# Patient Record
Sex: FEMALE | Race: WHITE | NOT HISPANIC OR LATINO | URBAN - METROPOLITAN AREA
[De-identification: names, ages, dates, MRNs, and addresses within clinical notes are randomized per-mention and may not be internally consistent; named-entity substitution may affect disease eponyms.]

---

## 2020-10-29 ENCOUNTER — EMERGENCY (EMERGENCY)
Facility: HOSPITAL | Age: 85
LOS: 0 days | Discharge: HOME | End: 2020-10-29
Attending: EMERGENCY MEDICINE | Admitting: EMERGENCY MEDICINE
Payer: MEDICARE

## 2020-10-29 VITALS
SYSTOLIC BLOOD PRESSURE: 141 MMHG | DIASTOLIC BLOOD PRESSURE: 67 MMHG | OXYGEN SATURATION: 95 % | RESPIRATION RATE: 20 BRPM | TEMPERATURE: 98 F | HEART RATE: 104 BPM

## 2020-10-29 VITALS
SYSTOLIC BLOOD PRESSURE: 120 MMHG | HEART RATE: 106 BPM | DIASTOLIC BLOOD PRESSURE: 56 MMHG | OXYGEN SATURATION: 95 % | RESPIRATION RATE: 18 BRPM

## 2020-10-29 DIAGNOSIS — R10.9 UNSPECIFIED ABDOMINAL PAIN: ICD-10-CM

## 2020-10-29 DIAGNOSIS — R41.0 DISORIENTATION, UNSPECIFIED: ICD-10-CM

## 2020-10-29 DIAGNOSIS — R11.10 VOMITING, UNSPECIFIED: ICD-10-CM

## 2020-10-29 DIAGNOSIS — F03.90 UNSPECIFIED DEMENTIA, UNSPECIFIED SEVERITY, WITHOUT BEHAVIORAL DISTURBANCE, PSYCHOTIC DISTURBANCE, MOOD DISTURBANCE, AND ANXIETY: ICD-10-CM

## 2020-10-29 LAB
ALBUMIN SERPL ELPH-MCNC: 3.7 G/DL — SIGNIFICANT CHANGE UP (ref 3.5–5.2)
ALP SERPL-CCNC: 104 U/L — SIGNIFICANT CHANGE UP (ref 30–115)
ALT FLD-CCNC: 5 U/L — SIGNIFICANT CHANGE UP (ref 0–41)
ANION GAP SERPL CALC-SCNC: 11 MMOL/L — SIGNIFICANT CHANGE UP (ref 7–14)
APPEARANCE UR: CLEAR — SIGNIFICANT CHANGE UP
APTT BLD: 27.8 SEC — SIGNIFICANT CHANGE UP (ref 27–39.2)
AST SERPL-CCNC: 9 U/L — SIGNIFICANT CHANGE UP (ref 0–41)
BASOPHILS # BLD AUTO: 0.04 K/UL — SIGNIFICANT CHANGE UP (ref 0–0.2)
BASOPHILS NFR BLD AUTO: 0.4 % — SIGNIFICANT CHANGE UP (ref 0–1)
BILIRUB SERPL-MCNC: 0.8 MG/DL — SIGNIFICANT CHANGE UP (ref 0.2–1.2)
BILIRUB UR-MCNC: NEGATIVE — SIGNIFICANT CHANGE UP
BLD GP AB SCN SERPL QL: SIGNIFICANT CHANGE UP
BUN SERPL-MCNC: 14 MG/DL — SIGNIFICANT CHANGE UP (ref 10–20)
CALCIUM SERPL-MCNC: 8.9 MG/DL — SIGNIFICANT CHANGE UP (ref 8.5–10.1)
CHLORIDE SERPL-SCNC: 106 MMOL/L — SIGNIFICANT CHANGE UP (ref 98–110)
CO2 SERPL-SCNC: 23 MMOL/L — SIGNIFICANT CHANGE UP (ref 17–32)
COLOR SPEC: YELLOW — SIGNIFICANT CHANGE UP
CREAT SERPL-MCNC: 0.7 MG/DL — SIGNIFICANT CHANGE UP (ref 0.7–1.5)
DIFF PNL FLD: NEGATIVE — SIGNIFICANT CHANGE UP
EOSINOPHIL # BLD AUTO: 0.08 K/UL — SIGNIFICANT CHANGE UP (ref 0–0.7)
EOSINOPHIL NFR BLD AUTO: 0.7 % — SIGNIFICANT CHANGE UP (ref 0–8)
GLUCOSE SERPL-MCNC: 108 MG/DL — HIGH (ref 70–99)
GLUCOSE UR QL: NEGATIVE — SIGNIFICANT CHANGE UP
HCT VFR BLD CALC: 34.4 % — LOW (ref 37–47)
HGB BLD-MCNC: 10.9 G/DL — LOW (ref 12–16)
IMM GRANULOCYTES NFR BLD AUTO: 0.3 % — SIGNIFICANT CHANGE UP (ref 0.1–0.3)
INR BLD: 1.16 RATIO — SIGNIFICANT CHANGE UP (ref 0.65–1.3)
KETONES UR-MCNC: NEGATIVE — SIGNIFICANT CHANGE UP
LACTATE SERPL-SCNC: 1.3 MMOL/L — SIGNIFICANT CHANGE UP (ref 0.7–2)
LEUKOCYTE ESTERASE UR-ACNC: NEGATIVE — SIGNIFICANT CHANGE UP
LIDOCAIN IGE QN: 19 U/L — SIGNIFICANT CHANGE UP (ref 7–60)
LYMPHOCYTES # BLD AUTO: 1.22 K/UL — SIGNIFICANT CHANGE UP (ref 1.2–3.4)
LYMPHOCYTES # BLD AUTO: 10.9 % — LOW (ref 20.5–51.1)
MCHC RBC-ENTMCNC: 28.8 PG — SIGNIFICANT CHANGE UP (ref 27–31)
MCHC RBC-ENTMCNC: 31.7 G/DL — LOW (ref 32–37)
MCV RBC AUTO: 90.8 FL — SIGNIFICANT CHANGE UP (ref 81–99)
MONOCYTES # BLD AUTO: 0.77 K/UL — HIGH (ref 0.1–0.6)
MONOCYTES NFR BLD AUTO: 6.9 % — SIGNIFICANT CHANGE UP (ref 1.7–9.3)
NEUTROPHILS # BLD AUTO: 9.03 K/UL — HIGH (ref 1.4–6.5)
NEUTROPHILS NFR BLD AUTO: 80.8 % — HIGH (ref 42.2–75.2)
NITRITE UR-MCNC: NEGATIVE — SIGNIFICANT CHANGE UP
NRBC # BLD: 0 /100 WBCS — SIGNIFICANT CHANGE UP (ref 0–0)
PH UR: 6.5 — SIGNIFICANT CHANGE UP (ref 5–8)
PLATELET # BLD AUTO: 179 K/UL — SIGNIFICANT CHANGE UP (ref 130–400)
POTASSIUM SERPL-MCNC: 3.5 MMOL/L — SIGNIFICANT CHANGE UP (ref 3.5–5)
POTASSIUM SERPL-SCNC: 3.5 MMOL/L — SIGNIFICANT CHANGE UP (ref 3.5–5)
PROT SERPL-MCNC: 6 G/DL — SIGNIFICANT CHANGE UP (ref 6–8)
PROT UR-MCNC: SIGNIFICANT CHANGE UP
PROTHROM AB SERPL-ACNC: 13.3 SEC — HIGH (ref 9.95–12.87)
RBC # BLD: 3.79 M/UL — LOW (ref 4.2–5.4)
RBC # FLD: 14.3 % — SIGNIFICANT CHANGE UP (ref 11.5–14.5)
SODIUM SERPL-SCNC: 140 MMOL/L — SIGNIFICANT CHANGE UP (ref 135–146)
SP GR SPEC: 1.02 — SIGNIFICANT CHANGE UP (ref 1.01–1.03)
TROPONIN T SERPL-MCNC: <0.01 NG/ML — SIGNIFICANT CHANGE UP
UROBILINOGEN FLD QL: ABNORMAL
WBC # BLD: 11.17 K/UL — HIGH (ref 4.8–10.8)
WBC # FLD AUTO: 11.17 K/UL — HIGH (ref 4.8–10.8)

## 2020-10-29 PROCEDURE — 93010 ELECTROCARDIOGRAM REPORT: CPT

## 2020-10-29 PROCEDURE — 70450 CT HEAD/BRAIN W/O DYE: CPT | Mod: 26

## 2020-10-29 PROCEDURE — 71045 X-RAY EXAM CHEST 1 VIEW: CPT | Mod: 26

## 2020-10-29 PROCEDURE — 74177 CT ABD & PELVIS W/CONTRAST: CPT | Mod: 26

## 2020-10-29 PROCEDURE — 99285 EMERGENCY DEPT VISIT HI MDM: CPT

## 2020-10-29 RX ORDER — MIDAZOLAM HYDROCHLORIDE 1 MG/ML
2 INJECTION, SOLUTION INTRAMUSCULAR; INTRAVENOUS ONCE
Refills: 0 | Status: DISCONTINUED | OUTPATIENT
Start: 2020-10-29 | End: 2020-10-29

## 2020-10-29 RX ORDER — SODIUM CHLORIDE 9 MG/ML
1000 INJECTION, SOLUTION INTRAVENOUS ONCE
Refills: 0 | Status: COMPLETED | OUTPATIENT
Start: 2020-10-29 | End: 2020-10-29

## 2020-10-29 RX ORDER — ONDANSETRON 8 MG/1
4 TABLET, FILM COATED ORAL ONCE
Refills: 0 | Status: COMPLETED | OUTPATIENT
Start: 2020-10-29 | End: 2020-10-29

## 2020-10-29 RX ADMIN — ONDANSETRON 4 MILLIGRAM(S): 8 TABLET, FILM COATED ORAL at 14:27

## 2020-10-29 RX ADMIN — SODIUM CHLORIDE 1000 MILLILITER(S): 9 INJECTION, SOLUTION INTRAVENOUS at 14:27

## 2020-10-29 RX ADMIN — MIDAZOLAM HYDROCHLORIDE 2 MILLIGRAM(S): 1 INJECTION, SOLUTION INTRAMUSCULAR; INTRAVENOUS at 16:30

## 2020-10-29 RX ADMIN — SODIUM CHLORIDE 1000 MILLILITER(S): 9 INJECTION, SOLUTION INTRAVENOUS at 17:31

## 2020-10-29 NOTE — ED PROVIDER NOTE - PROGRESS NOTE DETAILS
vomitting diarrhea for a couple of days, on examination dementia decreased a&o pt lack of appetite, Labs reviewed. CT reviewed. according to family member, pt is fully at baseline and has been the same since a few years ago. Pt feels comfortable sending her home and taking care of her. Labs unremarkable, CT results given to family. Will RAYMOND.

## 2020-10-29 NOTE — ED ADULT NURSE NOTE - OBJECTIVE STATEMENT
[facility] for vomiting x 1 week. Pt confused at baseline, hx of dementia.

## 2020-10-29 NOTE — ED PROVIDER NOTE - CARE PLAN
Principal Discharge DX:	Abdominal pain   Principal Discharge DX:	Abdominal pain  Secondary Diagnosis:	Vomiting  Secondary Diagnosis:	Confusion

## 2020-10-29 NOTE — ED PROVIDER NOTE - CARE PROVIDER_API CALL
Yovani Kumari)  Gastroenterology; Internal Medicine  [address] [place] [address]  Phone: [phone]  Fax: [phone]  Follow Up Time:

## 2020-10-29 NOTE — ED PROVIDER NOTE - PATIENT PORTAL LINK FT
You can access the FollowMyHealth Patient Portal offered by [facility] by registering at the following website: [email or web address]. By joining [facility]’s FollowMyHealth portal, you will also be able to view your health information using other applications (apps) compatible with our system.

## 2020-10-29 NOTE — ED PROVIDER NOTE - ATTENDING CONTRIBUTION TO CARE
hx obtained from group home: 89 y/o female h/o dementia, HTN, DM presents from group home with nbnb vomiting x approx 1 week as well as increasing confusion, sx are persistent, no fevers or associated complaints at present, limited hx due to pt's mental status.    No old chart available for review.  I have reviewed and agree with the nursing note, except as documented in my note.    VSS, awake, alert to person only, non-toxic appearing, lying comfortably in stretcher, in NAD, oropharynx clear, mmm, no JVD or bruit, no skin rash or lesions, chest CTAB, non-labored breathing, no w/r/r, +S1/S2, RRR, no m/r/g, abdomen soft, mild diffuse ttp w/o peritoneal signs, ND, +BS, no peripheral edema or deformities, alert, clear speech. hx obtained from halfway via phone and nephew (at bedside): 89 y/o female h/o dementia, HTN, DM presents from group home with nbnb vomiting x approx 1 week as well as increasing confusion, sx are persistent, no fevers or associated complaints at present, limited hx due to pt's mental status.    No old chart available for review.  I have reviewed and agree with the nursing note, except as documented in my note.    VSS, awake, alert to person only, non-toxic appearing, lying comfortably in stretcher, in NAD, oropharynx clear, mmm, no JVD or bruit, no skin rash or lesions, chest CTAB, non-labored breathing, no w/r/r, +S1/S2, RRR, no m/r/g, abdomen soft, mild diffuse ttp w/o peritoneal signs, ND, +BS, no peripheral edema or deformities, alert, clear speech. Baseline mental status as per nephew at bedside. hx obtained from [facility] via phone and nephew (at bedside): 89 y/o female h/o dementia, HTN, DM, s/o COVID approx 1 month ago, presents from group home with nbnb vomiting x approx 1 week as well as increasing confusion, sx are persistent, no fevers or associated complaints at present, limited hx due to pt's mental status.    No old chart available for review.  I have reviewed and agree with the nursing note, except as documented in my note.    VSS, awake, alert to person only, non-toxic appearing, lying comfortably in stretcher, in NAD, oropharynx clear, mmm, no JVD or bruit, no skin rash or lesions, chest CTAB, non-labored breathing, no w/r/r, +S1/S2, RRR, no m/r/g, abdomen soft, mild diffuse ttp w/o peritoneal signs, ND, +BS, no peripheral edema or deformities, alert, clear speech. Baseline mental status as per nephew at bedside. hx obtained from nursing home via phone and nephew (at bedside): 89 y/o female h/o dementia, HTN, DM, s/p COVID approx 1 month ago, presents from group home with nbnb vomiting x approx 1 week as well as increasing confusion, sx are persistent, no fevers or associated complaints at present, limited hx due to pt's mental status.    No old chart available for review.  I have reviewed and agree with the nursing note, except as documented in my note.    VSS, awake, alert to person only, non-toxic appearing, lying comfortably in stretcher, in NAD, oropharynx clear, mmm, no JVD or bruit, no skin rash or lesions, chest CTAB, non-labored breathing, no w/r/r, +S1/S2, RRR, no m/r/g, abdomen soft, mild diffuse ttp w/o peritoneal signs, ND, +BS, no peripheral edema or deformities, alert, clear speech. Baseline mental status as per nephew at bedside.

## 2020-10-29 NOTE — ED PROVIDER NOTE - PHYSICAL EXAMINATION
CONSTITUTIONAL: Well-developed; well-nourished; A&Ox1  SKIN: warm, dry  HEAD: Normocephalic; atraumatic.  EYES: PERRL, EOMI, no conjunctival erythema  ENT: No nasal discharge; airway clear.  NECK: Supple; non tender.  CARD: S1, S2 normal; no murmurs, gallops, or rubs. Regular rate and rhythm.   RESP: No wheezes, rales or rhonchi.  ABD: soft ntnd  EXT: Normal ROM.  No clubbing, cyanosis or edema.   LYMPH: No acute cervical adenopathy.  NEURO: Alert, oriented, grossly unremarkable  PSYCH: Cooperative, appropriate, Dementia

## 2020-10-31 LAB
CULTURE RESULTS: SIGNIFICANT CHANGE UP
SPECIMEN SOURCE: SIGNIFICANT CHANGE UP

## 2021-02-25 NOTE — ED PROVIDER NOTE - OBJECTIVE STATEMENT
Received refill request for Zofran.    Last MD FU: 1/28/21  Next MD FU: 3/4/21  Last refill: 8/24/20    Patient is on active treatment with Revlimid/dex/velcade. Refill request approved.     89 y.o. F with PMH of HTN dementia HLD presents otdya with abdominal pain and vomitting x 1 week, became more altered. No fever no chills no urinary symptoms. Pt reported to have been decreased PO.

## 2021-04-16 ENCOUNTER — INPATIENT (INPATIENT)
Facility: HOSPITAL | Age: 86
LOS: 3 days | Discharge: NURSING, REHAB OR LONG-TERM CARE | End: 2021-04-20
Attending: INTERNAL MEDICINE | Admitting: INTERNAL MEDICINE
Payer: MEDICARE

## 2021-04-16 VITALS
TEMPERATURE: 98 F | HEART RATE: 70 BPM | RESPIRATION RATE: 22 BRPM | SYSTOLIC BLOOD PRESSURE: 88 MMHG | OXYGEN SATURATION: 99 % | WEIGHT: 110.01 LBS | DIASTOLIC BLOOD PRESSURE: 54 MMHG

## 2021-04-16 PROBLEM — F03.90 UNSPECIFIED DEMENTIA, UNSPECIFIED SEVERITY, WITHOUT BEHAVIORAL DISTURBANCE, PSYCHOTIC DISTURBANCE, MOOD DISTURBANCE, AND ANXIETY: Chronic | Status: ACTIVE | Noted: 2020-10-29

## 2021-04-16 PROBLEM — E78.00 PURE HYPERCHOLESTEROLEMIA, UNSPECIFIED: Chronic | Status: ACTIVE | Noted: 2020-10-29

## 2021-04-16 PROBLEM — I10 ESSENTIAL (PRIMARY) HYPERTENSION: Chronic | Status: ACTIVE | Noted: 2020-10-29

## 2021-04-16 LAB
ALBUMIN SERPL ELPH-MCNC: 2.8 G/DL — LOW (ref 3.5–5.2)
ALP SERPL-CCNC: 91 U/L — SIGNIFICANT CHANGE UP (ref 30–115)
ALT FLD-CCNC: 7 U/L — SIGNIFICANT CHANGE UP (ref 0–41)
ANION GAP SERPL CALC-SCNC: 12 MMOL/L — SIGNIFICANT CHANGE UP (ref 7–14)
APPEARANCE UR: CLEAR — SIGNIFICANT CHANGE UP
APTT BLD: 29.5 SEC — SIGNIFICANT CHANGE UP (ref 27–39.2)
AST SERPL-CCNC: 14 U/L — SIGNIFICANT CHANGE UP (ref 0–41)
BASOPHILS # BLD AUTO: 0.03 K/UL — SIGNIFICANT CHANGE UP (ref 0–0.2)
BASOPHILS NFR BLD AUTO: 0.2 % — SIGNIFICANT CHANGE UP (ref 0–1)
BILIRUB SERPL-MCNC: 0.8 MG/DL — SIGNIFICANT CHANGE UP (ref 0.2–1.2)
BILIRUB UR-MCNC: NEGATIVE — SIGNIFICANT CHANGE UP
BUN SERPL-MCNC: 19 MG/DL — SIGNIFICANT CHANGE UP (ref 10–20)
CALCIUM SERPL-MCNC: 8.7 MG/DL — SIGNIFICANT CHANGE UP (ref 8.5–10.1)
CHLORIDE SERPL-SCNC: 101 MMOL/L — SIGNIFICANT CHANGE UP (ref 98–110)
CO2 SERPL-SCNC: 23 MMOL/L — SIGNIFICANT CHANGE UP (ref 17–32)
COLOR SPEC: YELLOW — SIGNIFICANT CHANGE UP
CREAT SERPL-MCNC: 0.9 MG/DL — SIGNIFICANT CHANGE UP (ref 0.7–1.5)
DIFF PNL FLD: NEGATIVE — SIGNIFICANT CHANGE UP
EOSINOPHIL # BLD AUTO: 0.01 K/UL — SIGNIFICANT CHANGE UP (ref 0–0.7)
EOSINOPHIL NFR BLD AUTO: 0.1 % — SIGNIFICANT CHANGE UP (ref 0–8)
GLUCOSE SERPL-MCNC: 127 MG/DL — HIGH (ref 70–99)
GLUCOSE UR QL: NEGATIVE — SIGNIFICANT CHANGE UP
HCT VFR BLD CALC: 27.3 % — LOW (ref 37–47)
HGB BLD-MCNC: 8.3 G/DL — LOW (ref 12–16)
IMM GRANULOCYTES NFR BLD AUTO: 0.9 % — HIGH (ref 0.1–0.3)
INR BLD: 2.21 RATIO — HIGH (ref 0.65–1.3)
KETONES UR-MCNC: NEGATIVE — SIGNIFICANT CHANGE UP
LACTATE SERPL-SCNC: 1.7 MMOL/L — SIGNIFICANT CHANGE UP (ref 0.7–2)
LEUKOCYTE ESTERASE UR-ACNC: NEGATIVE — SIGNIFICANT CHANGE UP
LIDOCAIN IGE QN: 20 U/L — SIGNIFICANT CHANGE UP (ref 7–60)
LYMPHOCYTES # BLD AUTO: 1.17 K/UL — LOW (ref 1.2–3.4)
LYMPHOCYTES # BLD AUTO: 9.4 % — LOW (ref 20.5–51.1)
MAGNESIUM SERPL-MCNC: 1.9 MG/DL — SIGNIFICANT CHANGE UP (ref 1.8–2.4)
MCHC RBC-ENTMCNC: 25.8 PG — LOW (ref 27–31)
MCHC RBC-ENTMCNC: 30.4 G/DL — LOW (ref 32–37)
MCV RBC AUTO: 84.8 FL — SIGNIFICANT CHANGE UP (ref 81–99)
MONOCYTES # BLD AUTO: 1.07 K/UL — HIGH (ref 0.1–0.6)
MONOCYTES NFR BLD AUTO: 8.6 % — SIGNIFICANT CHANGE UP (ref 1.7–9.3)
NEUTROPHILS # BLD AUTO: 10.02 K/UL — HIGH (ref 1.4–6.5)
NEUTROPHILS NFR BLD AUTO: 80.8 % — HIGH (ref 42.2–75.2)
NITRITE UR-MCNC: NEGATIVE — SIGNIFICANT CHANGE UP
NRBC # BLD: 0 /100 WBCS — SIGNIFICANT CHANGE UP (ref 0–0)
NT-PROBNP SERPL-SCNC: HIGH PG/ML (ref 0–300)
PH UR: 6.5 — SIGNIFICANT CHANGE UP (ref 5–8)
PLATELET # BLD AUTO: 235 K/UL — SIGNIFICANT CHANGE UP (ref 130–400)
POTASSIUM SERPL-MCNC: 3.8 MMOL/L — SIGNIFICANT CHANGE UP (ref 3.5–5)
POTASSIUM SERPL-SCNC: 3.8 MMOL/L — SIGNIFICANT CHANGE UP (ref 3.5–5)
PROT SERPL-MCNC: 5.6 G/DL — LOW (ref 6–8)
PROT UR-MCNC: SIGNIFICANT CHANGE UP
PROTHROM AB SERPL-ACNC: 25.4 SEC — HIGH (ref 9.95–12.87)
RBC # BLD: 3.22 M/UL — LOW (ref 4.2–5.4)
RBC # FLD: 17.2 % — HIGH (ref 11.5–14.5)
SARS-COV-2 RNA SPEC QL NAA+PROBE: SIGNIFICANT CHANGE UP
SODIUM SERPL-SCNC: 136 MMOL/L — SIGNIFICANT CHANGE UP (ref 135–146)
SP GR SPEC: 1.02 — SIGNIFICANT CHANGE UP (ref 1.01–1.03)
TROPONIN T SERPL-MCNC: 0.02 NG/ML — HIGH
TROPONIN T SERPL-MCNC: <0.01 NG/ML — SIGNIFICANT CHANGE UP
UROBILINOGEN FLD QL: ABNORMAL
WBC # BLD: 12.41 K/UL — HIGH (ref 4.8–10.8)
WBC # FLD AUTO: 12.41 K/UL — HIGH (ref 4.8–10.8)

## 2021-04-16 PROCEDURE — 74177 CT ABD & PELVIS W/CONTRAST: CPT | Mod: 26,MA

## 2021-04-16 PROCEDURE — 73590 X-RAY EXAM OF LOWER LEG: CPT | Mod: 26,50

## 2021-04-16 PROCEDURE — 73552 X-RAY EXAM OF FEMUR 2/>: CPT | Mod: 26,50

## 2021-04-16 PROCEDURE — 93010 ELECTROCARDIOGRAM REPORT: CPT

## 2021-04-16 PROCEDURE — 71045 X-RAY EXAM CHEST 1 VIEW: CPT | Mod: 26

## 2021-04-16 PROCEDURE — 99285 EMERGENCY DEPT VISIT HI MDM: CPT | Mod: CS

## 2021-04-16 RX ORDER — APIXABAN 2.5 MG/1
5 TABLET, FILM COATED ORAL EVERY 12 HOURS
Refills: 0 | Status: DISCONTINUED | OUTPATIENT
Start: 2021-04-16 | End: 2021-04-20

## 2021-04-16 RX ORDER — PREDNISOLONE SODIUM PHOSPHATE 1 %
1 DROPS OPHTHALMIC (EYE)
Refills: 0 | Status: DISCONTINUED | OUTPATIENT
Start: 2021-04-16 | End: 2021-04-20

## 2021-04-16 RX ORDER — ATORVASTATIN CALCIUM 80 MG/1
10 TABLET, FILM COATED ORAL DAILY
Refills: 0 | Status: DISCONTINUED | OUTPATIENT
Start: 2021-04-16 | End: 2021-04-20

## 2021-04-16 RX ORDER — MIRTAZAPINE 45 MG/1
7.5 TABLET, ORALLY DISINTEGRATING ORAL ONCE
Refills: 0 | Status: DISCONTINUED | OUTPATIENT
Start: 2021-04-16 | End: 2021-04-20

## 2021-04-16 RX ORDER — ENOXAPARIN SODIUM 100 MG/ML
40 INJECTION SUBCUTANEOUS AT BEDTIME
Refills: 0 | Status: DISCONTINUED | OUTPATIENT
Start: 2021-04-16 | End: 2021-04-16

## 2021-04-16 RX ORDER — MEMANTINE HYDROCHLORIDE 10 MG/1
5 TABLET ORAL
Refills: 0 | Status: DISCONTINUED | OUTPATIENT
Start: 2021-04-16 | End: 2021-04-20

## 2021-04-16 RX ORDER — METOPROLOL TARTRATE 50 MG
50 TABLET ORAL EVERY 8 HOURS
Refills: 0 | Status: DISCONTINUED | OUTPATIENT
Start: 2021-04-16 | End: 2021-04-20

## 2021-04-16 RX ORDER — HYDROCHLOROTHIAZIDE 25 MG
25 TABLET ORAL DAILY
Refills: 0 | Status: DISCONTINUED | OUTPATIENT
Start: 2021-04-16 | End: 2021-04-17

## 2021-04-16 RX ORDER — QUETIAPINE FUMARATE 200 MG/1
25 TABLET, FILM COATED ORAL AT BEDTIME
Refills: 0 | Status: DISCONTINUED | OUTPATIENT
Start: 2021-04-16 | End: 2021-04-20

## 2021-04-16 RX ORDER — ASPIRIN/CALCIUM CARB/MAGNESIUM 324 MG
81 TABLET ORAL DAILY
Refills: 0 | Status: DISCONTINUED | OUTPATIENT
Start: 2021-04-16 | End: 2021-04-20

## 2021-04-16 RX ORDER — SENNA PLUS 8.6 MG/1
1 TABLET ORAL AT BEDTIME
Refills: 0 | Status: DISCONTINUED | OUTPATIENT
Start: 2021-04-16 | End: 2021-04-20

## 2021-04-16 RX ORDER — PANTOPRAZOLE SODIUM 20 MG/1
40 TABLET, DELAYED RELEASE ORAL
Refills: 0 | Status: DISCONTINUED | OUTPATIENT
Start: 2021-04-16 | End: 2021-04-20

## 2021-04-16 RX ORDER — FUROSEMIDE 40 MG
40 TABLET ORAL ONCE
Refills: 0 | Status: COMPLETED | OUTPATIENT
Start: 2021-04-16 | End: 2021-04-16

## 2021-04-16 RX ORDER — MEMANTINE HYDROCHLORIDE 10 MG/1
14 TABLET ORAL DAILY
Refills: 0 | Status: DISCONTINUED | OUTPATIENT
Start: 2021-04-16 | End: 2021-04-16

## 2021-04-16 RX ADMIN — Medication 40 MILLIGRAM(S): at 16:27

## 2021-04-16 NOTE — H&P ADULT - HISTORY OF PRESENT ILLNESS
89 year old female with a PMhx of HTN, DLD, dementia (AAOx0)    CC:    History goes back to:     ROS is positive for:     ROS is negative for:     Of note:    In the ED: VS all WNL  Labs significant for: WBC: 12K, hb:8.3 (previously 10.9 1 year ago), INR:2, pro-BNP:11,000  Troponin: 0.02  EKG: A.fib, rate controlled  CXR: no acute cardiopulmonary changes   CT abdomen:   1.  No CT evidence of an acute abdominopelvic pathology.  2.  Since October 29, 2020, unchanged multiloculated cystic left hepatic lobe mass. As previously recommended contrast-enhanced liver protocol MRI can be obtained for further evaluation.  3.  Cardiomegaly with small bilateral pleural effusions and interlobular septal thickening, compatible with CHF, interstitial edema.  89 year old female with a PMhx of HTN, DLD, GERD, GERD, DVTs ( in 6/2020 - lower extremities - on eliquis) dementia (AAOx0), depression,  hx of covid-19 in 4/2020,     CC: leg pain   History obtained from [facility] living nurse (Jony, cell # [phone]) and her niece (Argelia, her power of )      History goes back to: day of admission when patient was noted to be moaning in pain. Her nurse says she palpated her abdomen and noted that patient appeared to be in pain and that the patient was also rubbing her leg indicating pain, thus she called EMS.    ROS is negative for: no fever, no chills, no SOB, no nausea or vomiting. No diarrhea, no constipation (almost daily bowel movement), no urinary retention no falls or trauma.   No lower extremity edema/swelling. Patient has no bedsores, no skin breaks     Of note: at baseline patient is AAOx0, responds to verbal stimuli can answer questions at times, but mostly inappropriate responces.   Mostly in the wheelchair and bed, at times gets up with assistance.   No aspiration risks.    In the ED: VS all WNL  Labs significant for: WBC: 12K, hb:8.3 (previously 10.9 1 year ago), INR:2, pro-BNP:11,000  Troponin: 0.02  EKG: A.fib, rate controlled  CXR: no acute cardiopulmonary changes   CT abdomen:   1.  No CT evidence of an acute abdominopelvic pathology.  2.  Since October 29, 2020, unchanged multiloculated cystic left hepatic lobe mass. As previously recommended contrast-enhanced liver protocol MRI can be obtained for further evaluation.  3.  Cardiomegaly with small bilateral pleural effusions and interlobular septal thickening, compatible with CHF, interstitial edema.

## 2021-04-16 NOTE — H&P ADULT - NSHPPHYSICALEXAM_GEN_ALL_CORE
Physical Exam:  General: NAD,   Neurology: A&Ox0, does not follow command   Eyes: PERRLA/ EOMI  ENT/Neck: Neck supple, trachea midline, No JVD  Respiratory: B/L basilar rales, No wheezing, rhonchi  Cardiovascular: Normal rate regular rhythm, S1S2, no murmurs, rubs or gallops  Abdominal: Soft, non-tender, non-distended  Extremities: no pedal edema, + peripheral pulses  Musculoskeletal: 5/5 BUE and BLE muscle strength  Skin: No Rashes, Hematoma, Ecchymosis Physical Exam:    Vital Signs Last 24 Hrs  T(C): 36.4 (17 Apr 2021 04:59), Max: 36.9 (16 Apr 2021 15:04)  T(F): 97.5 (17 Apr 2021 04:59), Max: 98.4 (16 Apr 2021 15:04)  HR: 93 (17 Apr 2021 04:59) (69 - 93)  BP: 110/75 (17 Apr 2021 04:59) (88/54 - 121/74)  BP(mean): --  RR: 16 (17 Apr 2021 04:59) (16 - 22)  SpO2: 97% (16 Apr 2021 23:28) (97% - 100%)    General: appears stated age  Psych: A&Ox0, does not follow commands  Neuro: moves all extremities  Eyes: PERRLA/ EOMI  ENT/Neck: Neck supple, trachea midline, No JVD  Respiratory: B/L basilar rales, No wheezing, rhonchi  Cardiovascular: Normal rate regular rhythm, S1S2, no murmurs, rubs or gallops  Abdominal: Soft, non-tender, non-distended  Extremities: no pedal edema, + peripheral pulses  Musculoskeletal: 5/5 BUE and BLE muscle strength  Skin: No Rashes, Hematoma, Ecchymosis

## 2021-04-16 NOTE — ED PROVIDER NOTE - ATTENDING CONTRIBUTION TO CARE
89yo F with PMHx HTN, HLD, GERD, DVT on Eliquis, dementia (baseline AAOx0, nonverbal, bed and wheelchair bound), h/o covid 4/2020, sent from NH for eval. Patient unable to provide any history. Per NH staff, patient was moaning this morning and appeared to grimace with palpation of abdomen. Per family at bedside, patient is DNR/DNI.     Vital signs reviewed  GENERAL: Patient nontoxic appearing, NAD  HEAD: NCAT  EYES: Anicteric  ENT: MMM  RESPIRATORY: Normal respiratory effort. CTA B/L. No wheezing, rales, rhonchi  CARDIOVASCULAR: Regular rate and rhythm  ABDOMEN: Soft. Nondistended. Nontender. No guarding or rebound.   MUSCULOSKELETAL/EXTREMITIES: Brisk cap refill. No leg edema, no focal TTP of lower extremities.   SKIN:  Warm and dry  NEURO: AAOx0. Moves all extremities. Does not follow commands.
audio

## 2021-04-16 NOTE — ED ADULT TRIAGE NOTE - CHIEF COMPLAINT QUOTE
89 yo F from SNF general malaise , bilateral leg pain.  lives on o2 at SNF after COVID. PMH dementia, alzheimer. on eliquis for hx of DVTs. C/O pain lower extremities

## 2021-04-16 NOTE — ED PROVIDER NOTE - OBJECTIVE STATEMENT
89y F pmh HTN, HLD, DVT on eliquis, Dementia sent from care facility for pain. As per EMS pt has been moaning in pain and holding her stomach since this morning, no aggravating or relieving factors.

## 2021-04-16 NOTE — H&P ADULT - ASSESSMENT
#     # anemia, normocytic  - keep hb>7  - Fe studies in AM    # cystic liver lesion  unchanged from prior imagining in 2020    # mildly elevated troponin    # A.fib  CHADSVASC score  Precipitated by:   - continue AC  - rate control  - TSH  - echocardiogram  - trend cardiac enzymes     # HTN/DLD    # dementia, AAOx0 at baseline     #Misc  - DVT Prophylaxis: lovenox  - GI Prophylaxis: N/A  - Diet: DASH  - Activity: as tolerated  - Code Status: Full Code    SOCIAL: patient lives alone, walks independently, comfortable with ADLs.    * MED REC COMPLETED WITH PATIENT*    Nephrologist:   Cardiologist:    PCP:    Pharmacy:  89 year old female with a PMhx of HTN, DLD, GERD, GERD, DVTs ( in 6/2020 - lower extremities - on eliquis) dementia (AAOx0), depression,  hx of covid-19 in 4/2020.     # pain - however on physical exam no localizing site for pain. Patient moans when i touch her legs, but not in any specific region.   - will do xray bilateral LE    # anemia, normocytic  - keep hb>7  - Fe studies in AM    # GERD  - PPI     # cystic liver lesion  unchanged from prior imagining in 2020  - as per family, no further eval to be done given her general medical status --> I agree    # mildly elevated troponin  EKG a.fib, old LBBB  - on asa/ Eliquis  - trend trops  - echocardiogram in trops uptrending    # A.fib - new?  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     # Non-specific b/l LE pain  - However on physical exam no localizing site for pain. Patient moans when i touch her legs, but not in any specific region.   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

## 2021-04-16 NOTE — H&P ADULT - CONVERSATION DETAILS
Patient is DNR, DNI  [place]  Argelia is aware of patients overall condition   At this point, wants to peruse full medical therapy  She understands that heroic measures are likely futile. Patient is DNR, DNI  MOL filled  Argelia is aware of patients overall condition   At this point, wants to pursue medical therapy  She understands that heroic measures are likely futile.

## 2021-04-16 NOTE — ED ADULT NURSE NOTE - CHIEF COMPLAINT QUOTE
89 yo F from SNF general malaise , bilateral leg pain.  lives on o2 at SNF after COVID. PMH dementia, alzheimer. on eliquis for hx of DVTs. C/O pain lower extremities

## 2021-04-16 NOTE — ED ADULT NURSE NOTE - OBJECTIVE STATEMENT
89 y/o female brought in for bilateral leg swelling. patient recently dx with chf on lasix but has +3 pitting edema to right leg and + 2 pitting edema to left leg. Patient demented at baseline.

## 2021-04-16 NOTE — H&P ADULT - NSHPLABSRESULTS_GEN_ALL_CORE
8.3    12.41 )-----------( 235      ( 16 Apr 2021 10:38 )             27.3       04-16    136  |  101  |  19  ----------------------------<  127<H>  3.8   |  23  |  0.9    Ca    8.7      16 Apr 2021 10:38  Mg     1.9     04-16    TPro  5.6<L>  /  Alb  2.8<L>  /  TBili  0.8  /  DBili  x   /  AST  14  /  ALT  7   /  AlkPhos  91  04-16 8.3    12.41 )-----------( 235      ( 16 Apr 2021 10:38 )             27.3       04-16    136  |  101  |  19  ----------------------------<  127<H>  3.8   |  23  |  0.9    Ca    8.7      16 Apr 2021 10:38  Mg     1.9     04-16    TPro  5.6<L>  /  Alb  2.8<L>  /  TBili  0.8  /  DBili  x   /  AST  14  /  ALT  7   /  AlkPhos  91  04-16          CT abdomen/pelvis:  1.  No CT evidence of an acute abdominopelvic pathology.  2.  Since October 29, 2020, unchanged multiloculated cystic left hepatic lobe mass. As previously recommended contrast-enhanced liver protocol MRI can be obtained for further evaluation.  3.  Cardiomegaly with small bilateral pleural effusions and interlobular septal thickening, compatible with CHF, interstitial edema.  4.  Additional stable findings as above.    CXR: no acute process    EKG:   Atrial fibrillation with a competing junctional pacemaker with premature  ventricular or aberrantly conducted complexes  Left bundle branch block    Previous records reviewed:  Pt was in a NSR but had a left bundle in 10/2020

## 2021-04-16 NOTE — ED PROVIDER NOTE - PHYSICAL EXAMINATION
CONST: NAD  EYES: Sclera and conjunctiva clear.   ENT: No nasal discharge. Oropharynx normal appearing  NECK: Non-tender, no meningeal signs. normal ROM. supple   CARD: S1 S2; No jvd  RESP: Faint b/l LL crackles. No distress  GI: Soft, non-tender, non-distended. no cva tenderness. normal BS  MS: Normal ROM in all extremities. pulses 2 +. no calf tenderness or swelling  SKIN: Warm, dry, no acute rashes. Good turgor  NEURO: A&Ox0, No focal deficits. Strength 5/5 with no sensory deficits.

## 2021-04-16 NOTE — H&P ADULT - ATTENDING COMMENTS
Non-specific complaints  Patient is euvolemic on exam    Plan:  Supportive care  PT eval  Meds as dosed Pt is here w/ non-specific complaints and failure to thrive (functional decline, baseline neurocognitive impairment and inadequate PO intake). Was apparently grimacing on palpation of the abdomen and LE's at her NH - no involuntary guarding or peritoneal signs on my exam. LE's non-edematous. Lungs cta b/l (although poor inspiratory effort). Patient is euvolemic on exam.    Impression:   Failure to thrive  Doubt CHF exacerbation (no clinical signs/symptoms & not fluid overloaded on exam, unremarkable CXR)  A-fib is rate controlled    Plan:  Supportive care  PT eval  Meds as dosed  Anticipate a need for 48 hrs of in-pt care    DNR/DNI (goals of care as above)

## 2021-04-16 NOTE — ED PROVIDER NOTE - CLINICAL SUMMARY MEDICAL DECISION MAKING FREE TEXT BOX
I personally evaluated the patient. I reviewed the Resident’s or Physician Assistant’s note (as assigned above), and agree with the findings and plan except as documented in my note. Patient signed out to me by Dr. Davidson pending CT abd/pelvis, dispo. Patient evaluated for grimacing and holding her abdomen. Labs, ekg, cxr, CT abd/pelvis performed in ED. Pt requiring o2 in the ED. BP improved from initial triage vitals. Admitted to medicine for further evaluation and treatment.

## 2021-04-17 LAB
ALBUMIN SERPL ELPH-MCNC: 2.8 G/DL — LOW (ref 3.5–5.2)
ALP SERPL-CCNC: 103 U/L — SIGNIFICANT CHANGE UP (ref 30–115)
ALT FLD-CCNC: 7 U/L — SIGNIFICANT CHANGE UP (ref 0–41)
ANION GAP SERPL CALC-SCNC: 12 MMOL/L — SIGNIFICANT CHANGE UP (ref 7–14)
AST SERPL-CCNC: 11 U/L — SIGNIFICANT CHANGE UP (ref 0–41)
BASOPHILS # BLD AUTO: 0.03 K/UL — SIGNIFICANT CHANGE UP (ref 0–0.2)
BASOPHILS NFR BLD AUTO: 0.4 % — SIGNIFICANT CHANGE UP (ref 0–1)
BILIRUB SERPL-MCNC: 0.6 MG/DL — SIGNIFICANT CHANGE UP (ref 0.2–1.2)
BUN SERPL-MCNC: 19 MG/DL — SIGNIFICANT CHANGE UP (ref 10–20)
CALCIUM SERPL-MCNC: 8.8 MG/DL — SIGNIFICANT CHANGE UP (ref 8.5–10.1)
CHLORIDE SERPL-SCNC: 101 MMOL/L — SIGNIFICANT CHANGE UP (ref 98–110)
CO2 SERPL-SCNC: 28 MMOL/L — SIGNIFICANT CHANGE UP (ref 17–32)
CREAT SERPL-MCNC: 0.9 MG/DL — SIGNIFICANT CHANGE UP (ref 0.7–1.5)
CULTURE RESULTS: NO GROWTH — SIGNIFICANT CHANGE UP
EOSINOPHIL # BLD AUTO: 0.09 K/UL — SIGNIFICANT CHANGE UP (ref 0–0.7)
EOSINOPHIL NFR BLD AUTO: 1.1 % — SIGNIFICANT CHANGE UP (ref 0–8)
GLUCOSE SERPL-MCNC: 69 MG/DL — LOW (ref 70–99)
HCT VFR BLD CALC: 27 % — LOW (ref 37–47)
HGB BLD-MCNC: 8.3 G/DL — LOW (ref 12–16)
IMM GRANULOCYTES NFR BLD AUTO: 0.6 % — HIGH (ref 0.1–0.3)
INR BLD: 1.75 RATIO — HIGH (ref 0.65–1.3)
LYMPHOCYTES # BLD AUTO: 1.41 K/UL — SIGNIFICANT CHANGE UP (ref 1.2–3.4)
LYMPHOCYTES # BLD AUTO: 17.8 % — LOW (ref 20.5–51.1)
MAGNESIUM SERPL-MCNC: 1.9 MG/DL — SIGNIFICANT CHANGE UP (ref 1.8–2.4)
MCHC RBC-ENTMCNC: 25.8 PG — LOW (ref 27–31)
MCHC RBC-ENTMCNC: 30.7 G/DL — LOW (ref 32–37)
MCV RBC AUTO: 83.9 FL — SIGNIFICANT CHANGE UP (ref 81–99)
MONOCYTES # BLD AUTO: 0.57 K/UL — SIGNIFICANT CHANGE UP (ref 0.1–0.6)
MONOCYTES NFR BLD AUTO: 7.2 % — SIGNIFICANT CHANGE UP (ref 1.7–9.3)
NEUTROPHILS # BLD AUTO: 5.75 K/UL — SIGNIFICANT CHANGE UP (ref 1.4–6.5)
NEUTROPHILS NFR BLD AUTO: 72.9 % — SIGNIFICANT CHANGE UP (ref 42.2–75.2)
NRBC # BLD: 0 /100 WBCS — SIGNIFICANT CHANGE UP (ref 0–0)
PLATELET # BLD AUTO: 262 K/UL — SIGNIFICANT CHANGE UP (ref 130–400)
POTASSIUM SERPL-MCNC: 3.3 MMOL/L — LOW (ref 3.5–5)
POTASSIUM SERPL-SCNC: 3.3 MMOL/L — LOW (ref 3.5–5)
PROT SERPL-MCNC: 5.7 G/DL — LOW (ref 6–8)
PROTHROM AB SERPL-ACNC: 20.1 SEC — HIGH (ref 9.95–12.87)
RBC # BLD: 3.22 M/UL — LOW (ref 4.2–5.4)
RBC # FLD: 17.2 % — HIGH (ref 11.5–14.5)
SODIUM SERPL-SCNC: 141 MMOL/L — SIGNIFICANT CHANGE UP (ref 135–146)
SPECIMEN SOURCE: SIGNIFICANT CHANGE UP
TROPONIN T SERPL-MCNC: <0.01 NG/ML — SIGNIFICANT CHANGE UP
WBC # BLD: 7.9 K/UL — SIGNIFICANT CHANGE UP (ref 4.8–10.8)
WBC # FLD AUTO: 7.9 K/UL — SIGNIFICANT CHANGE UP (ref 4.8–10.8)

## 2021-04-17 PROCEDURE — 99223 1ST HOSP IP/OBS HIGH 75: CPT

## 2021-04-17 PROCEDURE — 93306 TTE W/DOPPLER COMPLETE: CPT | Mod: 26

## 2021-04-17 RX ORDER — POTASSIUM CHLORIDE 20 MEQ
20 PACKET (EA) ORAL
Refills: 0 | Status: COMPLETED | OUTPATIENT
Start: 2021-04-17 | End: 2021-04-17

## 2021-04-17 RX ORDER — ACETAMINOPHEN 500 MG
650 TABLET ORAL EVERY 6 HOURS
Refills: 0 | Status: DISCONTINUED | OUTPATIENT
Start: 2021-04-17 | End: 2021-04-20

## 2021-04-17 RX ORDER — FUROSEMIDE 40 MG
40 TABLET ORAL DAILY
Refills: 0 | Status: DISCONTINUED | OUTPATIENT
Start: 2021-04-17 | End: 2021-04-19

## 2021-04-17 RX ADMIN — SENNA PLUS 1 TABLET(S): 8.6 TABLET ORAL at 22:15

## 2021-04-17 RX ADMIN — MEMANTINE HYDROCHLORIDE 5 MILLIGRAM(S): 10 TABLET ORAL at 17:01

## 2021-04-17 RX ADMIN — Medication 50 MILLIGRAM(S): at 05:49

## 2021-04-17 RX ADMIN — APIXABAN 5 MILLIGRAM(S): 2.5 TABLET, FILM COATED ORAL at 17:01

## 2021-04-17 RX ADMIN — APIXABAN 5 MILLIGRAM(S): 2.5 TABLET, FILM COATED ORAL at 05:48

## 2021-04-17 RX ADMIN — ATORVASTATIN CALCIUM 10 MILLIGRAM(S): 80 TABLET, FILM COATED ORAL at 22:15

## 2021-04-17 RX ADMIN — Medication 81 MILLIGRAM(S): at 13:44

## 2021-04-17 RX ADMIN — Medication 25 MILLIGRAM(S): at 05:49

## 2021-04-17 RX ADMIN — Medication 1 DROP(S): at 17:01

## 2021-04-17 RX ADMIN — Medication 50 MILLIEQUIVALENT(S): at 15:49

## 2021-04-17 RX ADMIN — Medication 40 MILLIGRAM(S): at 18:37

## 2021-04-17 RX ADMIN — Medication 1 DROP(S): at 05:47

## 2021-04-17 RX ADMIN — MEMANTINE HYDROCHLORIDE 5 MILLIGRAM(S): 10 TABLET ORAL at 05:48

## 2021-04-17 RX ADMIN — QUETIAPINE FUMARATE 25 MILLIGRAM(S): 200 TABLET, FILM COATED ORAL at 22:15

## 2021-04-17 RX ADMIN — PANTOPRAZOLE SODIUM 40 MILLIGRAM(S): 20 TABLET, DELAYED RELEASE ORAL at 06:03

## 2021-04-17 RX ADMIN — Medication 50 MILLIGRAM(S): at 22:15

## 2021-04-17 RX ADMIN — Medication 50 MILLIEQUIVALENT(S): at 18:23

## 2021-04-17 RX ADMIN — Medication 50 MILLIGRAM(S): at 13:44

## 2021-04-17 NOTE — PROGRESS NOTE ADULT - ASSESSMENT
Assessment and Plan  Case of a 89 year old female patient with Dementia from [facility] with HTN, DL, history of DVT who was brought on 04/16 for pain from unknown source (possibly abdomen versus leg per history), status post imaging of abdomen and legs, found to have a drop in Hb, elevated proBNP, and effusions with interstitial edema on imaging, admitted for pain control and possible undiagnosed acute on chronic heart failure exacerbation. Currently hemodynamically stable.       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   - Continue Tylenol 650mg Q6h PRN for pain    - For possible leg pain in setting of history of DVT in June 2020  * History of leg DVT in June 2020 (unsure which side or vein as there is no report on [place] or HIE). 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  * CT AP IC (04/16): unchanged multiloculated cystic left hepatic lobe mass compared to CT 10/29/20 -> Recommend MR with contrast  --> Family refusing further investigations so will not proceed with MRI imaging as recommended  --> Monitor for nausea and vomiting    - For possible pain from Constipation  * Home meds Colase and Senna 1 tab at bedtime  --> Monitor BM daily: last 04/17 midnight  --> Continue Senna tab     - For possible suprapubic discomfort from possible UTI  * Urinalysis (04/16) negative nitrite or LE, no bacteria  * Ruled out  --> Follow up urine culture (received 04/16)      - Ruled out Hematoma in setting of AC and drop in Hb  * Home med Eliquis  * Drop in Hb from 10.9 10/29/20 to 8.3 04/16  * Low BP 88/54mmHg but normal HR 70 bpm in ED 04/16  * CT AP IC (04/16) no hematoma on imaging       New onset Atrial Fibrillation without RVR  * Rate Controlled hx of 60's bpm  * Home Eliquis for Hx of DVT and Lopressor 50mg Q8h for HTN  * No prior TSH    - Monitor Telemetry  - Monitor HR: 04/16 78bpm  - Keep K>4 and Mg>2  - For AC: continue eliquis 5mg BID  - For rate control: continue Lopressor 50mg Q8h  - Follow up TSH (ordered for 04/17)      Rule out Acute on Chronic Heart Failure Exacerbation in setting of Elevated ProBNP and Interstitial Edema on imaging  * No TTE on [place] or HIE  * Pro BNP (04/16) 02851 (no prior pro-BNP)  * Home meds lopressor 50mg Q8h, HCTZ 25mg QD for HTN  * Euvolemia on exam  * CXR (04/16) no acute cardiopulmonary process  * CT AP IC (04/16) small bilateral pleural effusions, CM, interstitial edema    - Monitor accurate intake output: 04/17 net -2.1L, -2.1L in 24 hours (urine output 2.1L in 24 hours)  - Monitor weight daily: 04/16 49.9 kg  - Monitor SaO2 and O2 requirements  - Monitor daily chest X rays: 04/16 no acute cardiopulmonary process  - Continue diuresis with HCTZ PO 25mg QD. S/P IV Lasix 40mg x1 dose 04/16  - Will repeat TTE 04/17 since none in chart      Elevated Troponins  Dyslipidemia  * No lipid profile in chart  * Home Atorvastatin 10mg QD, Aspirin 81mg QD  * Troponin (04/16) 0.02 -> <0.01 -> 04/17 <0.01  * ECG (04/16) afib, old LBBB    - Trend cardiac enzymes as above  - Will repeat TTE 04/17 since none in chart especially if troponin trends up  - Continue Aspirin 81mg QD  - Continue Atorvastatin 10mg QD      Normocytic Anemia  * Hb drop from 10.9, MCV 90.8,  on 10/29/2020 to 8.3, MCV 84.8, RDW 17.2 on 04/16  * No source of gross bleeding  * No history of colonoscopy  * INR ED 04/16 2.21    - Trend CBC and MCV daily and transfuse to a target of 7 if no CAD suspected  - Keep active type and screen: ordered one for 04/17  - Monitor for bleeding or signs of bleed (HR, BP)  - Will continue AC and Aspirin for now  - Trend INR: 04/16 2.21 -> repeat 04/17      GERD  * Home med protonix 40mg QD  - Continue Protonix 40mg QD      HTN  * Home med HCTZ 25mg QD, Lopressor 50mg Q8h  - Monitor BP: 04/16 121/72 mmHg  - Continue Lopressor 50mg Q8h  - Continue HCTZ 25mg QD      Dementia  Depression  * Baseline AAO x0  * Lives at [facility] since discharge last April 2020 post COVID Pneumonia Admission  * Home meds: Mirtazepine 15mg QD, Seroquel 25mg at bedtime, Memantine 14mg QD    - Continue Memantine 5mg BID  - Continue Seroquel 25mg at bedtime  - S/P Mirtazepine 7.5mg x1 04/17    Others  - DVT Prophylaxis: Eliquis 5mg BID  - GI Prophylaxis: Pantoprazole 40mg PO QD   - Diet: Regular  - Code Status: DNR/DNI      Barriers to learning: NO  Discharge Planning: Patient will be discharged once stable and  Plan was communicated with medical team      Dalton Aragon MD  PGY - 1 Internal Medicine   [facility]

## 2021-04-17 NOTE — PROGRESS NOTE ADULT - SUBJECTIVE AND OBJECTIVE BOX
Progress Note  This morning patient was seen and examined at bedside.    Today is hospital day 1d.  Ms. Smith is doing fine.   She is not oriented to time, place, or person and is not interactive.  She seemed to be moaning in pain in morning especially on abdominal palpation.  Her appetite is reduced, but she has no nausea or vomiting.   She has a soft bowel movement on  midnight.   She is not ambulating as she is bed-ridden.   She is voiding via khan catheter.      Vital Signs in the last 24 hours   Vitals Summary T(C): 36.2 (21 @ 14:09), Max: 36.4 (21 @ 23:14)  HR: 78 (21 @ 14:09) (78 - 93)  BP: 107/54 (21 @ 14:09) (107/54 - 121/72)  RR: 18 (21 @ 14:09) (16 - 18)  SpO2: 99% (21 @ 08:14) (97% - 99%)  Vent Data   Intake/ Output   21 @ 07:01  -  21 @ 07:00  --------------------------------------------------------  IN: 0 mL / OUT: 2550 mL / NET: -2550 mL    21 @ 07:01  -  21 @ 17:49  --------------------------------------------------------  IN: 100 mL / OUT: 500 mL / NET: -400 mL      Physical Exam  * General Appearance: not Alert or oriented to time place or person, not cooperative or interactive  * Head: Normocephalic, without obvious abnormality, atraumatic  * Back: no bed sores noted  * Lungs: Respirations unlabored, Good bilateral air entry, normal breath sounds (Clear to auscultation bilaterally, no audible wheezes, crackles, or rhonchi)  * Heart: Regular Rate and Rhythm, normal S1 and S2, no audible murmur, rub, or gallop  * Abdomen: Symmetric, non-distended, soft, tenderness noted on palpation of lower quadrants and suprapubic area, bowel sounds active all four quadrants, no masses, no organomegaly (no hepatosplenomegaly)  * Extremities: Extremities normal, atraumatic, no cyanosis, no lower extremity pitting edema bilaterally, adequate dorsalis pedis pulses  * Pulses: 2+ and symmetric all extremities  * Skin: Skin color, texture, turgor normal, no rashes or lesions  * Lymph nodes: Cervical, supraclavicular, and axillary nodes normal        Investigations   Laboratory Workup  - CBC:                        8.3    7.90  )-----------( 262      ( 2021 07:59 )             27.0     - Chemistry:      141  |  101  |  19  ----------------------------<  69<L>  3.3<L>   |  28  |  0.9    Ca    8.8      2021 07:59  Mg     1.9         TPro  5.7<L>  /  Alb  2.8<L>  /  TBili  0.6  /  DBili  x   /  AST  11  /  ALT  7   /  AlkPhos  103      - Coagulation Studies:  PT/INR - ( 2021 07:59 )   PT: 20.10 sec;   INR: 1.75 ratio         PTT - ( 2021 10:38 )  PTT:29.5 sec  - ABG:    - Cardiac Markers:  CARDIAC MARKERS ( 2021 07:59 )  x     / <0.01 ng/mL / x     / x     / x      CARDIAC MARKERS ( 2021 21:14 )  x     / <0.01 ng/mL / x     / x     / x      CARDIAC MARKERS ( 2021 10:38 )  x     / 0.02 ng/mL / x     / x     / x          Microbiological Workup  Urinalysis Basic - ( 2021 12:07 )    Color: Yellow / Appearance: Clear / S.018 / pH: x  Gluc: x / Ketone: Negative  / Bili: Negative / Urobili: 3 mg/dL   Blood: x / Protein: Trace / Nitrite: Negative   Leuk Esterase: Negative / RBC: x / WBC x   Sq Epi: x / Non Sq Epi: x / Bacteria: x      Current Medications  Standing Medications  apixaban 5 milliGRAM(s) Oral every 12 hours  aspirin enteric coated 81 milliGRAM(s) Oral daily  atorvastatin Oral Tab/Cap - Peds 10 milliGRAM(s) Oral daily  furosemide   Injectable 40 milliGRAM(s) IV Push daily  memantine 5 milliGRAM(s) Oral two times a day  metoprolol tartrate Oral Tab/Cap - Peds 50 milliGRAM(s) Oral every 8 hours  mirtazapine 7.5 milliGRAM(s) Oral once  pantoprazole    Tablet 40 milliGRAM(s) Oral before breakfast  potassium chloride  20 mEq/100 mL IVPB 20 milliEquivalent(s) IV Intermittent every 2 hours  prednisoLONE acetate 1% Suspension 1 Drop(s) Both EYES two times a day  QUEtiapine 25 milliGRAM(s) Oral at bedtime  senna 8.6 milliGRAM(s) Oral Tablet - Peds 1 Tablet(s) Oral at bedtime    PRN Medications  acetaminophen   Tablet .. 650 milliGRAM(s) Oral every 6 hours PRN Temp greater or equal to 38C (100.4F), Mild Pain (1 - 3)    Singles Doses Administered  (Floorstock) 1 each &lt;see task&gt; GiveOnce  furosemide   Injectable 40 milliGRAM(s) IV Push Once

## 2021-04-18 LAB
ALBUMIN SERPL ELPH-MCNC: 3.1 G/DL — LOW (ref 3.5–5.2)
ALP SERPL-CCNC: 98 U/L — SIGNIFICANT CHANGE UP (ref 30–115)
ALT FLD-CCNC: 8 U/L — SIGNIFICANT CHANGE UP (ref 0–41)
ANION GAP SERPL CALC-SCNC: 15 MMOL/L — HIGH (ref 7–14)
APTT BLD: 36.1 SEC — SIGNIFICANT CHANGE UP (ref 27–39.2)
AST SERPL-CCNC: 13 U/L — SIGNIFICANT CHANGE UP (ref 0–41)
BILIRUB SERPL-MCNC: 0.5 MG/DL — SIGNIFICANT CHANGE UP (ref 0.2–1.2)
BUN SERPL-MCNC: 18 MG/DL — SIGNIFICANT CHANGE UP (ref 10–20)
CALCIUM SERPL-MCNC: 9.1 MG/DL — SIGNIFICANT CHANGE UP (ref 8.5–10.1)
CHLORIDE SERPL-SCNC: 100 MMOL/L — SIGNIFICANT CHANGE UP (ref 98–110)
CO2 SERPL-SCNC: 27 MMOL/L — SIGNIFICANT CHANGE UP (ref 17–32)
COVID-19 SPIKE DOMAIN AB INTERP: POSITIVE
COVID-19 SPIKE DOMAIN ANTIBODY RESULT: >250 U/ML — HIGH
CREAT SERPL-MCNC: 1 MG/DL — SIGNIFICANT CHANGE UP (ref 0.7–1.5)
GLUCOSE SERPL-MCNC: 73 MG/DL — SIGNIFICANT CHANGE UP (ref 70–99)
HCT VFR BLD CALC: 31.2 % — LOW (ref 37–47)
HGB BLD-MCNC: 9.6 G/DL — LOW (ref 12–16)
INR BLD: 1.65 RATIO — HIGH (ref 0.65–1.3)
MAGNESIUM SERPL-MCNC: 1.8 MG/DL — SIGNIFICANT CHANGE UP (ref 1.8–2.4)
MCHC RBC-ENTMCNC: 25.8 PG — LOW (ref 27–31)
MCHC RBC-ENTMCNC: 30.8 G/DL — LOW (ref 32–37)
MCV RBC AUTO: 83.9 FL — SIGNIFICANT CHANGE UP (ref 81–99)
NRBC # BLD: 0 /100 WBCS — SIGNIFICANT CHANGE UP (ref 0–0)
PLATELET # BLD AUTO: 276 K/UL — SIGNIFICANT CHANGE UP (ref 130–400)
POTASSIUM SERPL-MCNC: 3.9 MMOL/L — SIGNIFICANT CHANGE UP (ref 3.5–5)
POTASSIUM SERPL-SCNC: 3.9 MMOL/L — SIGNIFICANT CHANGE UP (ref 3.5–5)
PROT SERPL-MCNC: 6.3 G/DL — SIGNIFICANT CHANGE UP (ref 6–8)
PROTHROM AB SERPL-ACNC: 19 SEC — HIGH (ref 9.95–12.87)
RBC # BLD: 3.72 M/UL — LOW (ref 4.2–5.4)
RBC # FLD: 17.2 % — HIGH (ref 11.5–14.5)
SARS-COV-2 IGG+IGM SERPL QL IA: >250 U/ML — HIGH
SARS-COV-2 IGG+IGM SERPL QL IA: POSITIVE
SODIUM SERPL-SCNC: 142 MMOL/L — SIGNIFICANT CHANGE UP (ref 135–146)
WBC # BLD: 7.79 K/UL — SIGNIFICANT CHANGE UP (ref 4.8–10.8)
WBC # FLD AUTO: 7.79 K/UL — SIGNIFICANT CHANGE UP (ref 4.8–10.8)

## 2021-04-18 PROCEDURE — 71045 X-RAY EXAM CHEST 1 VIEW: CPT | Mod: 26

## 2021-04-18 PROCEDURE — 99233 SBSQ HOSP IP/OBS HIGH 50: CPT

## 2021-04-18 RX ADMIN — MEMANTINE HYDROCHLORIDE 5 MILLIGRAM(S): 10 TABLET ORAL at 17:26

## 2021-04-18 RX ADMIN — PANTOPRAZOLE SODIUM 40 MILLIGRAM(S): 20 TABLET, DELAYED RELEASE ORAL at 05:42

## 2021-04-18 RX ADMIN — QUETIAPINE FUMARATE 25 MILLIGRAM(S): 200 TABLET, FILM COATED ORAL at 22:12

## 2021-04-18 RX ADMIN — Medication 50 MILLIGRAM(S): at 14:43

## 2021-04-18 RX ADMIN — SENNA PLUS 1 TABLET(S): 8.6 TABLET ORAL at 22:12

## 2021-04-18 RX ADMIN — Medication 81 MILLIGRAM(S): at 11:10

## 2021-04-18 RX ADMIN — MEMANTINE HYDROCHLORIDE 5 MILLIGRAM(S): 10 TABLET ORAL at 05:42

## 2021-04-18 RX ADMIN — Medication 50 MILLIGRAM(S): at 05:42

## 2021-04-18 RX ADMIN — APIXABAN 5 MILLIGRAM(S): 2.5 TABLET, FILM COATED ORAL at 05:41

## 2021-04-18 RX ADMIN — Medication 40 MILLIGRAM(S): at 05:42

## 2021-04-18 RX ADMIN — Medication 1 DROP(S): at 05:43

## 2021-04-18 RX ADMIN — APIXABAN 5 MILLIGRAM(S): 2.5 TABLET, FILM COATED ORAL at 17:26

## 2021-04-18 RX ADMIN — Medication 1 DROP(S): at 17:26

## 2021-04-18 RX ADMIN — ATORVASTATIN CALCIUM 10 MILLIGRAM(S): 80 TABLET, FILM COATED ORAL at 22:12

## 2021-04-18 NOTE — PROGRESS NOTE ADULT - SUBJECTIVE AND OBJECTIVE BOX
INTERVAL HPI/OVERNIGHT EVENTS:    SUBJECTIVE: Patient seen and examined at bedside.     no cp, sob, abd pain, fever  no sob, orthopnea, pnd, cough    OBJECTIVE:    VITAL SIGNS:  Vital Signs Last 24 Hrs  T(C): 36.8 (18 Apr 2021 04:48), Max: 36.8 (18 Apr 2021 04:48)  T(F): 98.2 (18 Apr 2021 04:48), Max: 98.2 (18 Apr 2021 04:48)  HR: 79 (18 Apr 2021 04:48) (62 - 89)  BP: 109/66 (18 Apr 2021 04:48) (107/54 - 119/79)  BP(mean): --  RR: 18 (18 Apr 2021 04:48) (18 - 18)  SpO2: --      PHYSICAL EXAM:    General: NAD  HEENT: NC/AT; PERRL, clear conjunctiva  Neck: supple  Respiratory: base crackles  Cardiovascular: +S1/S2; RRR  Abdomen: soft, NT/ND; +BS x4  Extremities: WWP, 2+ peripheral pulses b/l; no LE edema  Skin: normal color and turgor; no rash  Neurological:    MEDICATIONS:  MEDICATIONS  (STANDING):  apixaban 5 milliGRAM(s) Oral every 12 hours  aspirin enteric coated 81 milliGRAM(s) Oral daily  atorvastatin Oral Tab/Cap - Peds 10 milliGRAM(s) Oral daily  furosemide   Injectable 40 milliGRAM(s) IV Push daily  memantine 5 milliGRAM(s) Oral two times a day  metoprolol tartrate Oral Tab/Cap - Peds 50 milliGRAM(s) Oral every 8 hours  mirtazapine 7.5 milliGRAM(s) Oral once  pantoprazole    Tablet 40 milliGRAM(s) Oral before breakfast  prednisoLONE acetate 1% Suspension 1 Drop(s) Both EYES two times a day  QUEtiapine 25 milliGRAM(s) Oral at bedtime  senna 8.6 milliGRAM(s) Oral Tablet - Peds 1 Tablet(s) Oral at bedtime    MEDICATIONS  (PRN):  acetaminophen   Tablet .. 650 milliGRAM(s) Oral every 6 hours PRN Temp greater or equal to 38C (100.4F), Mild Pain (1 - 3)      ALLERGIES:  Allergies    No Known Allergies    Intolerances        LABS:                        9.6    7.79  )-----------( 276      ( 18 Apr 2021 05:56 )             31.2     Hemoglobin: 9.6 g/dL (04-18 @ 05:56)  Hemoglobin: 8.3 g/dL (04-17 @ 07:59)  Hemoglobin: 8.3 g/dL (04-16 @ 10:38)    CBC Full  -  ( 18 Apr 2021 05:56 )  WBC Count : 7.79 K/uL  RBC Count : 3.72 M/uL  Hemoglobin : 9.6 g/dL  Hematocrit : 31.2 %  Platelet Count - Automated : 276 K/uL  Mean Cell Volume : 83.9 fL  Mean Cell Hemoglobin : 25.8 pg  Mean Cell Hemoglobin Concentration : 30.8 g/dL  Auto Neutrophil # : x  Auto Lymphocyte # : x  Auto Monocyte # : x  Auto Eosinophil # : x  Auto Basophil # : x  Auto Neutrophil % : x  Auto Lymphocyte % : x  Auto Monocyte % : x  Auto Eosinophil % : x  Auto Basophil % : x    04-18    142  |  100  |  18  ----------------------------<  73  3.9   |  27  |  1.0    Ca    9.1      18 Apr 2021 05:56  Mg     1.8     04-18    TPro  6.3  /  Alb  3.1<L>  /  TBili  0.5  /  DBili  x   /  AST  13  /  ALT  8   /  AlkPhos  98  04-18    Creatinine Trend: 1.0<--, 0.9<--, 0.9<--  LIVER FUNCTIONS - ( 18 Apr 2021 05:56 )  Alb: 3.1 g/dL / Pro: 6.3 g/dL / ALK PHOS: 98 U/L / ALT: 8 U/L / AST: 13 U/L / GGT: x           PT/INR - ( 18 Apr 2021 05:56 )   PT: 19.00 sec;   INR: 1.65 ratio         PTT - ( 18 Apr 2021 05:56 )  PTT:36.1 sec    hs Troponin:              CSF:                      EKG:   MICROBIOLOGY:    Culture - Urine (collected 16 Apr 2021 12:07)  Source: .Urine Catheterized  Final Report (17 Apr 2021 18:53):    No growth      IMAGING:      Labs, imaging, EKG personally reviewed    RADIOLOGY & ADDITIONAL TESTS: Reviewed.

## 2021-04-18 NOTE — PROGRESS NOTE ADULT - ASSESSMENT
89F PMHx HTN, DVT LE 6/2020 on eliquis, dementia here with abdominal pain. Acute on chronic HFpEF.    #Acute on chronic HFpEF  lasix 40 iv, not on home lasix  ct c/w pulm edema  repeat cxr  strict i/o  no hypoxia satting well on ra  tte noted  #Abdominal pain, unclear etiology  ct abd noted, largely unremarkable  resolving  tolerating diet  #HTN  controlled off medications  #DVT LE  eliquis  #Dementia  memantine 5 bid  Seroquel 25 qhs  #DVT ppx  eliquis    #Progress Note Handoff:  Pending (specify):  Consults_________, Tests________, Test Results_______, Other_____iv diuresis____  Family discussion:  Disposition: Home___/SNF___/Other________/Unknown at this time_____x__

## 2021-04-19 LAB
ALBUMIN SERPL ELPH-MCNC: 2.9 G/DL — LOW (ref 3.5–5.2)
ALP SERPL-CCNC: 93 U/L — SIGNIFICANT CHANGE UP (ref 30–115)
ALT FLD-CCNC: 7 U/L — SIGNIFICANT CHANGE UP (ref 0–41)
ANION GAP SERPL CALC-SCNC: 13 MMOL/L — SIGNIFICANT CHANGE UP (ref 7–14)
APTT BLD: 38.9 SEC — SIGNIFICANT CHANGE UP (ref 27–39.2)
AST SERPL-CCNC: 11 U/L — SIGNIFICANT CHANGE UP (ref 0–41)
BILIRUB SERPL-MCNC: 0.6 MG/DL — SIGNIFICANT CHANGE UP (ref 0.2–1.2)
BUN SERPL-MCNC: 19 MG/DL — SIGNIFICANT CHANGE UP (ref 10–20)
CALCIUM SERPL-MCNC: 8.8 MG/DL — SIGNIFICANT CHANGE UP (ref 8.5–10.1)
CHLORIDE SERPL-SCNC: 100 MMOL/L — SIGNIFICANT CHANGE UP (ref 98–110)
CO2 SERPL-SCNC: 28 MMOL/L — SIGNIFICANT CHANGE UP (ref 17–32)
CREAT SERPL-MCNC: 1 MG/DL — SIGNIFICANT CHANGE UP (ref 0.7–1.5)
GLUCOSE SERPL-MCNC: 84 MG/DL — SIGNIFICANT CHANGE UP (ref 70–99)
HCT VFR BLD CALC: 30.6 % — LOW (ref 37–47)
HGB BLD-MCNC: 9.4 G/DL — LOW (ref 12–16)
INR BLD: 1.86 RATIO — HIGH (ref 0.65–1.3)
IRON SATN MFR SERPL: 16 % — SIGNIFICANT CHANGE UP (ref 15–50)
IRON SATN MFR SERPL: 34 UG/DL — LOW (ref 35–150)
MAGNESIUM SERPL-MCNC: 1.7 MG/DL — LOW (ref 1.8–2.4)
MCHC RBC-ENTMCNC: 25.4 PG — LOW (ref 27–31)
MCHC RBC-ENTMCNC: 30.7 G/DL — LOW (ref 32–37)
MCV RBC AUTO: 82.7 FL — SIGNIFICANT CHANGE UP (ref 81–99)
NRBC # BLD: 0 /100 WBCS — SIGNIFICANT CHANGE UP (ref 0–0)
PLATELET # BLD AUTO: 234 K/UL — SIGNIFICANT CHANGE UP (ref 130–400)
POTASSIUM SERPL-MCNC: 3.3 MMOL/L — LOW (ref 3.5–5)
POTASSIUM SERPL-SCNC: 3.3 MMOL/L — LOW (ref 3.5–5)
PROT SERPL-MCNC: 6 G/DL — SIGNIFICANT CHANGE UP (ref 6–8)
PROTHROM AB SERPL-ACNC: 21.4 SEC — HIGH (ref 9.95–12.87)
RBC # BLD: 3.7 M/UL — LOW (ref 4.2–5.4)
RBC # FLD: 17.1 % — HIGH (ref 11.5–14.5)
SODIUM SERPL-SCNC: 141 MMOL/L — SIGNIFICANT CHANGE UP (ref 135–146)
TIBC SERPL-MCNC: 210 UG/DL — LOW (ref 220–430)
TRANSFERRIN SERPL-MCNC: 181 MG/DL — LOW (ref 200–360)
UIBC SERPL-MCNC: 176 UG/DL — SIGNIFICANT CHANGE UP (ref 110–370)
WBC # BLD: 7.74 K/UL — SIGNIFICANT CHANGE UP (ref 4.8–10.8)
WBC # FLD AUTO: 7.74 K/UL — SIGNIFICANT CHANGE UP (ref 4.8–10.8)

## 2021-04-19 PROCEDURE — 71045 X-RAY EXAM CHEST 1 VIEW: CPT | Mod: 26

## 2021-04-19 PROCEDURE — 99233 SBSQ HOSP IP/OBS HIGH 50: CPT

## 2021-04-19 RX ORDER — METOPROLOL TARTRATE 50 MG
12.5 TABLET ORAL ONCE
Refills: 0 | Status: COMPLETED | OUTPATIENT
Start: 2021-04-19 | End: 2021-04-19

## 2021-04-19 RX ORDER — MAGNESIUM SULFATE 500 MG/ML
2 VIAL (ML) INJECTION
Refills: 0 | Status: COMPLETED | OUTPATIENT
Start: 2021-04-19 | End: 2021-04-19

## 2021-04-19 RX ORDER — POTASSIUM CHLORIDE 20 MEQ
20 PACKET (EA) ORAL
Refills: 0 | Status: COMPLETED | OUTPATIENT
Start: 2021-04-19 | End: 2021-04-19

## 2021-04-19 RX ORDER — FUROSEMIDE 40 MG
40 TABLET ORAL DAILY
Refills: 0 | Status: DISCONTINUED | OUTPATIENT
Start: 2021-04-20 | End: 2021-04-20

## 2021-04-19 RX ORDER — IRON SUCROSE 20 MG/ML
200 INJECTION, SOLUTION INTRAVENOUS EVERY 24 HOURS
Refills: 0 | Status: DISCONTINUED | OUTPATIENT
Start: 2021-04-19 | End: 2021-04-20

## 2021-04-19 RX ORDER — FERROUS SULFATE 325(65) MG
325 TABLET ORAL DAILY
Refills: 0 | Status: DISCONTINUED | OUTPATIENT
Start: 2021-04-19 | End: 2021-04-19

## 2021-04-19 RX ADMIN — Medication 1 DROP(S): at 05:39

## 2021-04-19 RX ADMIN — IRON SUCROSE 110 MILLIGRAM(S): 20 INJECTION, SOLUTION INTRAVENOUS at 13:46

## 2021-04-19 RX ADMIN — SENNA PLUS 1 TABLET(S): 8.6 TABLET ORAL at 21:16

## 2021-04-19 RX ADMIN — Medication 50 MILLIGRAM(S): at 05:38

## 2021-04-19 RX ADMIN — MEMANTINE HYDROCHLORIDE 5 MILLIGRAM(S): 10 TABLET ORAL at 17:21

## 2021-04-19 RX ADMIN — Medication 50 MILLIGRAM(S): at 21:16

## 2021-04-19 RX ADMIN — Medication 40 MILLIGRAM(S): at 05:37

## 2021-04-19 RX ADMIN — PANTOPRAZOLE SODIUM 40 MILLIGRAM(S): 20 TABLET, DELAYED RELEASE ORAL at 05:38

## 2021-04-19 RX ADMIN — QUETIAPINE FUMARATE 25 MILLIGRAM(S): 200 TABLET, FILM COATED ORAL at 21:16

## 2021-04-19 RX ADMIN — APIXABAN 5 MILLIGRAM(S): 2.5 TABLET, FILM COATED ORAL at 17:21

## 2021-04-19 RX ADMIN — Medication 50 MILLIEQUIVALENT(S): at 13:46

## 2021-04-19 RX ADMIN — Medication 25 GRAM(S): at 11:24

## 2021-04-19 RX ADMIN — APIXABAN 5 MILLIGRAM(S): 2.5 TABLET, FILM COATED ORAL at 05:38

## 2021-04-19 RX ADMIN — MEMANTINE HYDROCHLORIDE 5 MILLIGRAM(S): 10 TABLET ORAL at 05:38

## 2021-04-19 RX ADMIN — Medication 50 MILLIEQUIVALENT(S): at 11:24

## 2021-04-19 RX ADMIN — Medication 1 DROP(S): at 17:21

## 2021-04-19 RX ADMIN — ATORVASTATIN CALCIUM 10 MILLIGRAM(S): 80 TABLET, FILM COATED ORAL at 21:16

## 2021-04-19 RX ADMIN — Medication 12.5 MILLIGRAM(S): at 01:21

## 2021-04-19 RX ADMIN — Medication 25 GRAM(S): at 13:46

## 2021-04-19 NOTE — PROGRESS NOTE ADULT - SUBJECTIVE AND OBJECTIVE BOX
Progress Note  This morning patient was seen and examined at bedside.    Today is hospital day 13.  Ms. Smith is doing fine.   She is not oriented to time, place, or person and is not interactive.  She is no longer moaning in pain compared to Saturday.  Her appetite is reduced, but she has no nausea or vomiting.   She has a soft bowel movement on 04/17 midnight.   She is not ambulating as she is bed-ridden.   She is voiding via khan catheter.        Vital Signs in the last 24 hours   Vitals Summary T(C): 36.9 (04-19-21 @ 05:44), Max: 36.9 (04-19-21 @ 05:44)  HR: 83 (04-19-21 @ 05:44) (71 - 95)  BP: 101/59 (04-19-21 @ 05:44) (80/49 - 114/55)  RR: 19 (04-19-21 @ 05:44) (18 - 19)  SpO2: --  Vent Data   Intake/ Output   04-18-21 @ 07:01  -  04-19-21 @ 07:00  --------------------------------------------------------  IN: 560 mL / OUT: 1325 mL / NET: -765 mL      Physical Exam  * General Appearance: not Alert or oriented to time place or person, not cooperative or interactive  * Head: Normocephalic, without obvious abnormality, atraumatic  * Back: no bed sores noted  * Lungs: Respirations unlabored, Good bilateral air entry, normal breath sounds (Clear to auscultation bilaterally, no audible wheezes, crackles, or rhonchi)  * Heart: Regular Rate and Rhythm, normal S1 and S2, no audible murmur, rub, or gallop  * Abdomen: Symmetric, non-distended, soft, tenderness noted on palpation of lower quadrants and suprapubic area, bowel sounds active all four quadrants, no masses, no organomegaly (no hepatosplenomegaly)  * Extremities: Extremities normal, atraumatic, no cyanosis, no lower extremity pitting edema bilaterally, adequate dorsalis pedis pulses  * Pulses: 2+ and symmetric all extremities  * Skin: Skin color, texture, turgor normal, no rashes or lesions  * Lymph nodes: Cervical, supraclavicular, and axillary nodes normal      Investigations   Laboratory Workup  - CBC:                        9.4    7.74  )-----------( 234      ( 19 Apr 2021 07:26 )             30.6     - Chemistry:  04-19    141  |  100  |  19  ----------------------------<  84  3.3<L>   |  28  |  1.0    Ca    8.8      19 Apr 2021 07:26  Mg     1.7     04-19    TPro  6.0  /  Alb  2.9<L>  /  TBili  0.6  /  DBili  x   /  AST  11  /  ALT  7   /  AlkPhos  93  04-19    - Coagulation Studies:  PT/INR - ( 19 Apr 2021 07:26 )   PT: 21.40 sec;   INR: 1.86 ratio    PTT - ( 19 Apr 2021 07:26 )  PTT:38.9 sec        Microbiological Workup    Culture - Urine (collected 16 Apr 2021 12:07)  Source: .Urine Catheterized  Final Report (17 Apr 2021 18:53):    No growth      Current Medications  Standing Medications  apixaban 5 milliGRAM(s) Oral every 12 hours  aspirin enteric coated 81 milliGRAM(s) Oral daily  atorvastatin Oral Tab/Cap - Peds 10 milliGRAM(s) Oral daily  iron sucrose IVPB 200 milliGRAM(s) IV Intermittent every 24 hours  magnesium sulfate  IVPB 2 Gram(s) IV Intermittent every 2 hours  memantine 5 milliGRAM(s) Oral two times a day  metoprolol tartrate Oral Tab/Cap - Peds 50 milliGRAM(s) Oral every 8 hours  mirtazapine 7.5 milliGRAM(s) Oral once  pantoprazole    Tablet 40 milliGRAM(s) Oral before breakfast  potassium chloride  20 mEq/100 mL IVPB 20 milliEquivalent(s) IV Intermittent every 2 hours  prednisoLONE acetate 1% Suspension 1 Drop(s) Both EYES two times a day  QUEtiapine 25 milliGRAM(s) Oral at bedtime  senna 8.6 milliGRAM(s) Oral Tablet - Peds 1 Tablet(s) Oral at bedtime    PRN Medications  acetaminophen   Tablet .. 650 milliGRAM(s) Oral every 6 hours PRN Temp greater or equal to 38C (100.4F), Mild Pain (1 - 3)    Singles Doses Administered  (Floorstock) 1 each &lt;see task&gt; GiveOnce  furosemide   Injectable 40 milliGRAM(s) IV Push Once  metoprolol tartrate 12.5 milliGRAM(s) Oral once  potassium chloride  20 mEq/100 mL IVPB 20 milliEquivalent(s) IV Intermittent every 2 hours

## 2021-04-19 NOTE — PROGRESS NOTE ADULT - SUBJECTIVE AND OBJECTIVE BOX
BRAYDON RAMIREZ  89y Female    CHIEF COMPLAINT:    Patient is a 89y old  Female who presents with a chief complaint of Leg pain (2021 11:56)      INTERVAL HPI/OVERNIGHT EVENTS:    Patient seen and examined.    ROS: All other systems are negative.    Vital Signs:    T(F): 98.4 (21 @ 05:44), Max: 98.4 (21 @ 05:44)  HR: 83 (21 @ 05:44) (71 - 95)  BP: 101/59 (21 @ 05:44) (80/49 - 114/55)  RR: 19 (21 @ 05:44) (18 - 19)  SpO2: --  I&O's Summary    2021 07:01  -  2021 07:00  --------------------------------------------------------  IN: 560 mL / OUT: 1325 mL / NET: -765 mL      Daily     Daily Weight in k.4 (2021 05:44)  CAPILLARY BLOOD GLUCOSE          PHYSICAL EXAM:    GENERAL:  NAD  SKIN: No rashes or lesions  HENT: Atraumatic. Normocephalic. PERRL. Moist membranes.  NECK: Supple, No JVD. No lymphadenopathy.  PULMONARY: CTA B/L. No wheezing. No rales  CVS: Normal S1, S2. Rate and Rhythm are regular. No murmurs.  ABDOMEN/GI: Soft, Nontender, Nondistended; BS present  EXTREMITIES: Peripheral pulses intact. No edema B/L LE.  NEUROLOGIC:  No motor or sensory deficit.  PSYCH: Alert & oriented x 3    Consultant(s) Notes Reviewed:  [x ] YES  [ ] NO  Care Discussed with Consultants/Other Providers [ x] YES  [ ] NO    EKG reviewed  Telemetry reviewed    LABS:                        9.4    7.74  )-----------( 234      ( 2021 07:26 )             30.6         141  |  100  |  19  ----------------------------<  84  3.3<L>   |  28  |  1.0    Ca    8.8      2021 07:26  Mg     1.7         TPro  6.0  /  Alb  2.9<L>  /  TBili  0.6  /  DBili  x   /  AST  11  /  ALT  7   /  AlkPhos  93      PT/INR - ( 2021 07:26 )   PT: 21.40 sec;   INR: 1.86 ratio         PTT - ( 2021 07:26 )  PTT:38.9 sec  Serum Pro-Brain Natriuretic Peptide: 29072 pg/mL (21 @ 10:38)    Trop <0.01, CKMB --, CK --, 21 @ 07:59  Trop <0.01, CKMB --, CK --, 21 @ 21:14        RADIOLOGY & ADDITIONAL TESTS:      Imaging or report Personally Reviewed:  [ ] YES  [ ] NO    Medications:  Standing  apixaban 5 milliGRAM(s) Oral every 12 hours  aspirin enteric coated 81 milliGRAM(s) Oral daily  atorvastatin Oral Tab/Cap - Peds 10 milliGRAM(s) Oral daily  iron sucrose IVPB 200 milliGRAM(s) IV Intermittent every 24 hours  magnesium sulfate  IVPB 2 Gram(s) IV Intermittent every 2 hours  memantine 5 milliGRAM(s) Oral two times a day  metoprolol tartrate Oral Tab/Cap - Peds 50 milliGRAM(s) Oral every 8 hours  mirtazapine 7.5 milliGRAM(s) Oral once  pantoprazole    Tablet 40 milliGRAM(s) Oral before breakfast  potassium chloride  20 mEq/100 mL IVPB 20 milliEquivalent(s) IV Intermittent every 2 hours  prednisoLONE acetate 1% Suspension 1 Drop(s) Both EYES two times a day  QUEtiapine 25 milliGRAM(s) Oral at bedtime  senna 8.6 milliGRAM(s) Oral Tablet - Peds 1 Tablet(s) Oral at bedtime    PRN Meds  acetaminophen   Tablet .. 650 milliGRAM(s) Oral every 6 hours PRN      Case discussed with resident    Care discussed with pt/family           BRAYDON RAMIREZ  89y Female    CHIEF COMPLAINT:    Patient is a 89y old  Female who presents with a chief complaint of Leg pain (2021 11:56)      INTERVAL HPI/OVERNIGHT EVENTS:    Patient seen and examined. Lying comfortably in the bed. Unable to give any history. No fever.     ROS: All other systems are negative.    Vital Signs:    T(F): 98.4 (21 @ 05:44), Max: 98.4 (21 @ 05:44)  HR: 83 (21 @ 05:44) (71 - 95)  BP: 101/59 (21 @ 05:44) (80/49 - 114/55)  RR: 19 (21 @ 05:44) (18 - 19)  SpO2: --  I&O's Summary    2021 07:01  -  2021 07:00  --------------------------------------------------------  IN: 560 mL / OUT: 1325 mL / NET: -765 mL      Daily     Daily Weight in k.4 (2021 05:44)  CAPILLARY BLOOD GLUCOSE          PHYSICAL EXAM:    GENERAL:  NAD  SKIN: No rashes or lesions  HENT: Atraumatic. Normocephalic. PERRL. Moist membranes.  NECK: Supple, No JVD. No lymphadenopathy.  PULMONARY: CTA B/L. No wheezing. No rales  CVS: Normal S1, S2. Rate and Rhythm are regular. No murmurs.  ABDOMEN/GI: Soft, Nontender, Nondistended; BS present  EXTREMITIES: Peripheral pulses intact. No edema B/L LE.  NEUROLOGIC:  No motor or sensory deficit.  PSYCH: Alert & oriented x 0    Consultant(s) Notes Reviewed:  [x ] YES  [ ] NO  Care Discussed with Consultants/Other Providers [ x] YES  [ ] NO    EKG reviewed  Telemetry reviewed    LABS:                        9.4    7.74  )-----------( 234      ( 2021 07:26 )             30.6         141  |  100  |  19  ----------------------------<  84  3.3<L>   |  28  |  1.0    Ca    8.8      2021 07:26  Mg     1.7         TPro  6.0  /  Alb  2.9<L>  /  TBili  0.6  /  DBili  x   /  AST  11  /  ALT  7   /  AlkPhos  93      PT/INR - ( 2021 07:26 )   PT: 21.40 sec;   INR: 1.86 ratio         PTT - ( 2021 07:26 )  PTT:38.9 sec  Serum Pro-Brain Natriuretic Peptide: 30676 pg/mL (21 @ 10:38)    Trop <0.01, CKMB --, CK --, 21 @ 07:59  Trop <0.01, CKMB --, CK --, 21 @ 21:14        RADIOLOGY & ADDITIONAL TESTS:      Imaging or report Personally Reviewed:  [ ] YES  [ ] NO    Medications:  Standing  apixaban 5 milliGRAM(s) Oral every 12 hours  aspirin enteric coated 81 milliGRAM(s) Oral daily  atorvastatin Oral Tab/Cap - Peds 10 milliGRAM(s) Oral daily  iron sucrose IVPB 200 milliGRAM(s) IV Intermittent every 24 hours  magnesium sulfate  IVPB 2 Gram(s) IV Intermittent every 2 hours  memantine 5 milliGRAM(s) Oral two times a day  metoprolol tartrate Oral Tab/Cap - Peds 50 milliGRAM(s) Oral every 8 hours  mirtazapine 7.5 milliGRAM(s) Oral once  pantoprazole    Tablet 40 milliGRAM(s) Oral before breakfast  potassium chloride  20 mEq/100 mL IVPB 20 milliEquivalent(s) IV Intermittent every 2 hours  prednisoLONE acetate 1% Suspension 1 Drop(s) Both EYES two times a day  QUEtiapine 25 milliGRAM(s) Oral at bedtime  senna 8.6 milliGRAM(s) Oral Tablet - Peds 1 Tablet(s) Oral at bedtime    PRN Meds  acetaminophen   Tablet .. 650 milliGRAM(s) Oral every 6 hours PRN      Case discussed with resident    Care discussed with pt/family

## 2021-04-19 NOTE — PROGRESS NOTE ADULT - ASSESSMENT
Assessment and Plan  Case of a 89 year old female patient with Dementia from [facility] with HTN, DL, history of DVT who was brought on 04/16 for pain from unknown source (possibly abdomen versus leg per history), status post imaging of abdomen and legs, found to have a drop in Hb, elevated proBNP, and effusions with interstitial edema on imaging, admitted for pain control and possible undiagnosed acute on chronic heart failure exacerbation. Currently hemodynamically stable.       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   - Continue Tylenol 650mg Q6h PRN for pain    - For possible leg pain in setting of history of DVT in June 2020  * History of leg DVT in June 2020 (unsure which side or vein as there is no report on [place] or HIE). 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  * CT AP IC (04/16): unchanged multiloculated cystic left hepatic lobe mass compared to CT 10/29/20 -> Recommend MR with contrast  --> Family refusing further investigations so will not proceed with MRI imaging as recommended  --> Monitor for nausea and vomiting    - For possible pain from Constipation  * Home meds Colase and Senna 1 tab at bedtime  --> Monitor BM daily: last 04/17 midnight  --> Continue Senna tab     - For possible suprapubic discomfort from possible UTI  * Urinalysis (04/16) negative nitrite or LE, no bacteria  * Ruled out  --> Follow up urine culture (received 04/16)      - Ruled out Hematoma in setting of AC and drop in Hb  * Home med Eliquis  * Drop in Hb from 10.9 10/29/20 to 8.3 04/16  * Low BP 88/54mmHg but normal HR 70 bpm in ED 04/16  * CT AP IC (04/16) no hematoma on imaging       New onset Atrial Fibrillation without RVR  * Rate Controlled hx of 60's bpm  * Home Eliquis for Hx of DVT and Lopressor 50mg Q8h for HTN  * No prior TSH    - Monitor Telemetry  - Monitor HR: 04/16 78bpm -> 04/18 95, 92, 71 bpm  - Keep K>4 and Mg>2  - For AC: continue eliquis 5mg BID  - For rate control: continue Lopressor 50mg Q8h  - Follow up TSH (ordered for 04/17)      Rule out Acute on Chronic Heart Failure Exacerbation in setting of Elevated ProBNP and Interstitial Edema on imaging  * TTE 04/17 EF 43%, mild TR, mild-mod AR, mod AS, borderline PHTN, Hypokinesias  * Pro BNP (04/16) 42701 (no prior pro-BNP)  * Home meds lopressor 50mg Q8h, HCTZ 25mg QD for HTN  * Euvolemia on exam  * CXR (04/16) no acute cardiopulmonary process  * CT AP IC (04/16) small bilateral pleural effusions, CM, interstitial edema    - Repeat TTE 04/17 EF 43%, mild TR, mild-mod AR, mod AS, borderline PHTN, Hypokinesias  - Monitor accurate intake output: 04/19 net -4.014L, -765L in 24 hours (urine output 1.325L in 24 hours)  - Monitor weight daily: 04/16 49.9 kg -> 58.6 kg 04/18 per flow sheet (inaccurate)  - Monitor SaO2 and O2 requirements: comfortable on RA  - Monitor daily chest X rays: 04/16 no acute cardiopulmonary process -> repeat 04/19  - Continue diuresis: switched from IV lasix 40mg QD to PO lasix 40mg QD starting 04/20. Was on HCTZ PO 25mg QD at home. S/P IV Lasix 40mg x1 dose 04/16      Elevated Troponins  Dyslipidemia  * No lipid profile in chart  * Home Atorvastatin 10mg QD, Aspirin 81mg QD  * Troponin (04/16) 0.02 -> <0.01 -> 04/17 <0.01  * ECG (04/16) afib, old LBBB    - Trend cardiac enzymes as above  - Will repeat TTE 04/17 since none in chart especially if troponin trends up  - Continue Aspirin 81mg QD  - Continue Atorvastatin 10mg QD      Normocytic Anemia  * Hb drop from 10.9, MCV 90.8,  on 10/29/2020 to 8.3, MCV 84.8, RDW 17.2 on 04/16  * No source of gross bleeding  * No history of colonoscopy  * INR ED 04/16 2.21    - Trend CBC and MCV daily and transfuse to a target of 7 if no CAD suspected  - Keep active type and screen: ordered one for 04/1  - Iron studies suggestive of Fe deficiency anemia: started on IV Venofer 200mg x5 doses on 04/19. Can discharge on FeSO4 325mg QD  - Monitor for bleeding or signs of bleed (HR, BP)  - Will continue AC and Aspirin for now  - Trend INR: 04/16 2.21 -> repeat 04/17      GERD  * Home med protonix 40mg QD  - Continue Protonix 40mg QD      HTN  * Home med HCTZ 25mg QD, Lopressor 50mg Q8h  - Monitor BP: 04/16 121/72 mmHg  - Continue Lopressor 50mg Q8h  - Continue diuresis as detailed above      Dementia  Depression  * Baseline AAO x0  * Lives at [facility] since discharge last April 2020 post COVID Pneumonia Admission  * Home meds: Mirtazepine 15mg QD, Seroquel 25mg at bedtime, Memantine 14mg QD    - Continue Memantine 5mg BID  - Continue Seroquel 25mg at bedtime  - S/P Mirtazepine 7.5mg x1 04/17    Others  - DVT Prophylaxis: Eliquis 5mg BID  - GI Prophylaxis: Pantoprazole 40mg PO QD   - Diet: Regular  - Code Status: DNR/DNI      Barriers to learning: NO  Discharge Planning: Patient will be discharged once stable and  Plan was communicated with medical team      Dalton Aragon MD  PGY - 1 Internal Medicine   [facility]

## 2021-04-19 NOTE — PROGRESS NOTE ADULT - ASSESSMENT
89 year old female with a PMhx of HTN, DLD, GERD, GERD, DVTs ( in 6/2020 - lower extremities - on eliquis) dementia (AAOx0), depression,  hx of covid-19 in 4/2020 was brought in for possible leg pain and abdominal pain. Pt unable to give any history due to dementia.     Failure to thrive.   Persistent A-Fib  Cystic liver mass  Advanced dementia  HTN / DL  Chronic DVT  Depression             PLAN: 89 year old female with a PMhx of HTN, DLD, GERD, GERD, DVTs ( in 6/2020 - lower extremities - on eliquis) dementia (AAOx0), depression,  hx of covid-19 in 4/2020 was brought in for possible leg pain and abdominal pain. Pt unable to give any history due to dementia.     Failure to thrive.   Persistent A-Fib  Cystic liver mass  Advanced dementia  HTN / DL  Chronic DVT  Depression  No CHF             PLAN:    ·	Supportive care  ·	No change in CT abd/pelvis when compared to the one done in 2020  ·	Pt has cystic liver mass. No further W/U as per family  ·	CE x 2 negative.   ·	Cont her home meds.   ·	Can D/C back to her facility    Progress Note Handoff    Pending (specify):  Consults_________, Tests________, Test Results_______, Other_Can D/C back to her facility________  Family discussion:  Disposition: Home___/SNF___/Other________/Unknown at this time________    Davy Feliciano MD  Spectra: [phone] 89 year old female with a PMhx of HTN, DLD, GERD, GERD, DVTs ( in 6/2020 - lower extremities - on eliquis) dementia (AAOx0), depression,  hx of covid-19 in 4/2020 was brought in for possible leg pain and abdominal pain. Pt unable to give any history due to dementia.     Failure to thrive.   Persistent A-Fib  Cystic liver mass  Advanced dementia  HTN / DL  Chronic DVT  Depression  No CHF  Hypokalemia / Hypomagnesemia              PLAN:    ·	Supportive care  ·	No change in CT abd/pelvis when compared to the one done in 2020  ·	Pt has cystic liver mass. No further W/U as per family  ·	CE x 2 negative.   ·	Cont her home meds.   ·	Replete Mg and K  ·	Can D/C back to her facility    Progress Note Handoff    Pending (specify):  Consults_________, Tests________, Test Results_______, Other_Can D/C back to her facility________  Family discussion:  Disposition: Home___/SNF___/Other________/Unknown at this time________    Davy Feliciano MD  Spectra: [phone]

## 2021-04-20 VITALS
HEART RATE: 103 BPM | SYSTOLIC BLOOD PRESSURE: 121 MMHG | RESPIRATION RATE: 18 BRPM | TEMPERATURE: 98 F | DIASTOLIC BLOOD PRESSURE: 68 MMHG

## 2021-04-20 LAB
ALBUMIN SERPL ELPH-MCNC: 3 G/DL — LOW (ref 3.5–5.2)
ALP SERPL-CCNC: 99 U/L — SIGNIFICANT CHANGE UP (ref 30–115)
ALT FLD-CCNC: 6 U/L — SIGNIFICANT CHANGE UP (ref 0–41)
ANION GAP SERPL CALC-SCNC: 10 MMOL/L — SIGNIFICANT CHANGE UP (ref 7–14)
AST SERPL-CCNC: 11 U/L — SIGNIFICANT CHANGE UP (ref 0–41)
BILIRUB SERPL-MCNC: 0.6 MG/DL — SIGNIFICANT CHANGE UP (ref 0.2–1.2)
BUN SERPL-MCNC: 19 MG/DL — SIGNIFICANT CHANGE UP (ref 10–20)
CALCIUM SERPL-MCNC: 8.7 MG/DL — SIGNIFICANT CHANGE UP (ref 8.5–10.1)
CHLORIDE SERPL-SCNC: 100 MMOL/L — SIGNIFICANT CHANGE UP (ref 98–110)
CO2 SERPL-SCNC: 28 MMOL/L — SIGNIFICANT CHANGE UP (ref 17–32)
CREAT SERPL-MCNC: 1 MG/DL — SIGNIFICANT CHANGE UP (ref 0.7–1.5)
FERRITIN SERPL-MCNC: 202 NG/ML — HIGH (ref 15–150)
GLUCOSE SERPL-MCNC: 87 MG/DL — SIGNIFICANT CHANGE UP (ref 70–99)
HCT VFR BLD CALC: 29.6 % — LOW (ref 37–47)
HGB BLD-MCNC: 9.1 G/DL — LOW (ref 12–16)
MAGNESIUM SERPL-MCNC: 2.5 MG/DL — HIGH (ref 1.8–2.4)
MCHC RBC-ENTMCNC: 25.6 PG — LOW (ref 27–31)
MCHC RBC-ENTMCNC: 30.7 G/DL — LOW (ref 32–37)
MCV RBC AUTO: 83.4 FL — SIGNIFICANT CHANGE UP (ref 81–99)
NRBC # BLD: 0 /100 WBCS — SIGNIFICANT CHANGE UP (ref 0–0)
PLATELET # BLD AUTO: 269 K/UL — SIGNIFICANT CHANGE UP (ref 130–400)
POTASSIUM SERPL-MCNC: 3.7 MMOL/L — SIGNIFICANT CHANGE UP (ref 3.5–5)
POTASSIUM SERPL-SCNC: 3.7 MMOL/L — SIGNIFICANT CHANGE UP (ref 3.5–5)
PROT SERPL-MCNC: 5.9 G/DL — LOW (ref 6–8)
RBC # BLD: 3.55 M/UL — LOW (ref 4.2–5.4)
RBC # FLD: 17.1 % — HIGH (ref 11.5–14.5)
SARS-COV-2 RNA SPEC QL NAA+PROBE: SIGNIFICANT CHANGE UP
SODIUM SERPL-SCNC: 138 MMOL/L — SIGNIFICANT CHANGE UP (ref 135–146)
TSH SERPL-MCNC: 3.25 UIU/ML — SIGNIFICANT CHANGE UP (ref 0.27–4.2)
WBC # BLD: 9.39 K/UL — SIGNIFICANT CHANGE UP (ref 4.8–10.8)
WBC # FLD AUTO: 9.39 K/UL — SIGNIFICANT CHANGE UP (ref 4.8–10.8)

## 2021-04-20 PROCEDURE — 71045 X-RAY EXAM CHEST 1 VIEW: CPT | Mod: 26

## 2021-04-20 PROCEDURE — 99239 HOSP IP/OBS DSCHRG MGMT >30: CPT

## 2021-04-20 RX ORDER — FERROUS SULFATE 325(65) MG
1 TABLET ORAL
Qty: 30 | Refills: 0
Start: 2021-04-20 | End: 2021-05-19

## 2021-04-20 RX ORDER — ACETAMINOPHEN 500 MG
2 TABLET ORAL
Qty: 240 | Refills: 0
Start: 2021-04-20 | End: 2021-05-19

## 2021-04-20 RX ORDER — ACETAMINOPHEN 500 MG
1 TABLET ORAL
Qty: 120 | Refills: 0
Start: 2021-04-20 | End: 2021-05-19

## 2021-04-20 RX ADMIN — IRON SUCROSE 110 MILLIGRAM(S): 20 INJECTION, SOLUTION INTRAVENOUS at 14:46

## 2021-04-20 RX ADMIN — Medication 1 DROP(S): at 05:41

## 2021-04-20 RX ADMIN — Medication 81 MILLIGRAM(S): at 12:35

## 2021-04-20 RX ADMIN — Medication 50 MILLIGRAM(S): at 05:41

## 2021-04-20 RX ADMIN — Medication 50 MILLIGRAM(S): at 14:46

## 2021-04-20 RX ADMIN — MEMANTINE HYDROCHLORIDE 5 MILLIGRAM(S): 10 TABLET ORAL at 05:41

## 2021-04-20 RX ADMIN — APIXABAN 5 MILLIGRAM(S): 2.5 TABLET, FILM COATED ORAL at 05:40

## 2021-04-20 NOTE — PROGRESS NOTE ADULT - ASSESSMENT
Assessment and Plan  Case of a 89 year old female patient with Dementia from [facility] with HTN, DL, history of DVT who was brought on 04/16 for pain from unknown source (possibly abdomen versus leg per history), status post imaging of abdomen and legs, found to have a drop in Hb, elevated proBNP, and effusions with interstitial edema on imaging, admitted for pain control and possible undiagnosed acute on chronic heart failure exacerbation. Currently hemodynamically stable.       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   - Continue Tylenol 650mg Q6h PRN for pain    - For possible leg pain in setting of history of DVT in June 2020  * History of leg DVT in June 2020 (unsure which side or vein as there is no report on [place] or HIE). 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  * CT AP IC (04/16): unchanged multiloculated cystic left hepatic lobe mass compared to CT 10/29/20 -> Recommend MR with contrast  --> Family refusing further investigations so will not proceed with MRI imaging as recommended  --> Monitor for nausea and vomiting    - For possible pain from Constipation  * Home meds Colase and Senna 1 tab at bedtime  --> Monitor BM daily  --> Continue Senna tab     - For possible suprapubic discomfort from possible UTI  * Urinalysis (04/16) negative nitrite or LE, no bacteria  * Ruled out  --> Follow up urine culture (04/16) no growth      - Ruled out Hematoma in setting of AC and drop in Hb  * Home med Eliquis  * Drop in Hb from 10.9 10/29/20 to 8.3 04/16  * Low BP 88/54mmHg but normal HR 70 bpm in ED 04/16  * CT AP IC (04/16) no hematoma on imaging       New onset Atrial Fibrillation without RVR  * Rate Controlled hx of 60's bpm  * Home Eliquis for Hx of DVT and Lopressor 50mg Q8h for HTN  * No prior TSH    - Monitor Telemetry  - Monitor HR: 04/16 78bpm -> 04/18 95, 92, 71 bpm -> 04/20 63 bpm  - Keep K>4 and Mg>2  - For AC: continue eliquis 5mg BID  - For rate control: continue Lopressor 50mg Q8h  - Follow up TSH (ordered for 04/17)      Rule out Acute on Chronic Heart Failure Exacerbation in setting of Elevated ProBNP and Interstitial Edema on imaging  * TTE 04/17 EF 43%, mild TR, mild-mod AR, mod AS, borderline PHTN, Hypokinesias  * Pro BNP (04/16) 45711 (no prior pro-BNP)  * Home meds lopressor 50mg Q8h, HCTZ 25mg QD for HTN  * Euvolemia on exam  * CXR (04/16) no acute cardiopulmonary process  * CT AP IC (04/16) small bilateral pleural effusions, CM, interstitial edema    - Repeat TTE 04/17 EF 43%, mild TR, mild-mod AR, mod AS, borderline PHTN, Hypokinesias  - Monitor accurate intake output: 04/20 net -4.85L, -840mL in 24 hours (urine output 1.2L in 24 hours)  - Monitor weight daily: 04/16 49.9 kg -> 58.6 kg 04/18 per flow sheet (inaccurate)  - Monitor SaO2 and O2 requirements: comfortable on RA  - Monitor daily chest X rays: 04/16 no acute cardiopulmonary process -> repeat 04/20  - Continue diuresis: switched from IV lasix 40mg QD to PO lasix 40mg QD starting 04/20. Was on HCTZ PO 25mg QD at home. S/P IV Lasix 40mg x1 dose 04/16      Elevated Troponins  Dyslipidemia  * No lipid profile in chart  * Home Atorvastatin 10mg QD, Aspirin 81mg QD  * Troponin (04/16) 0.02 -> <0.01 -> 04/17 <0.01  * ECG (04/16) afib, old LBBB    - Trend cardiac enzymes as above  - Repeat TTE 04/17 EF 43%, mild TR, mild-mod AR, mod AS, borderline PHTN, Hypokinesias  - Continue Aspirin 81mg QD  - Continue Atorvastatin 10mg QD      Normocytic Anemia  * Hb drop from 10.9, MCV 90.8,  on 10/29/2020 to 8.3, MCV 84.8, RDW 17.2 on 04/16  * No source of gross bleeding  * No history of colonoscopy  * INR ED 04/16 2.21    - Trend CBC and MCV daily and transfuse to a target of 7 if no CAD suspected  - Keep active type and screen: ordered one for 04/1  - Iron studies suggestive of Fe deficiency anemia: started on IV Venofer 200mg x5 doses on 04/19. Can discharge on FeSO4 325mg QD  - Monitor for bleeding or signs of bleed (HR, BP)  - Will continue AC and Aspirin for now  - Trend INR: 04/16 2.21 -> repeat 04/17      GERD  * Home med protonix 40mg QD  - Continue Protonix 40mg QD      HTN  * Home med HCTZ 25mg QD, Lopressor 50mg Q8h  - Monitor BP: 04/16 121/72 mmHg  - Continue Lopressor 50mg Q8h  - Continue diuresis as detailed above      Dementia  Depression  * Baseline AAO x0  * Lives at [facility] since discharge last April 2020 post COVID Pneumonia Admission  * Home meds: Mirtazepine 15mg QD, Seroquel 25mg at bedtime, Memantine 14mg QD    - Continue Memantine 5mg BID  - Continue Seroquel 25mg at bedtime  - S/P Mirtazepine 7.5mg x1 04/17    Others  - DVT Prophylaxis: Eliquis 5mg BID  - GI Prophylaxis: Pantoprazole 40mg PO QD   - Diet: Regular  - Code Status: DNR/DNI      Barriers to learning: NO  Discharge Planning: Patient will be discharged once stable and  Plan was communicated with medical team      Dalton Aragon MD  PGY - 1 Internal Medicine   [facility]

## 2021-04-20 NOTE — DISCHARGE NOTE PROVIDER - NSDCCPCAREPLAN_GEN_ALL_CORE_FT
PRINCIPAL DISCHARGE DIAGNOSIS  Diagnosis: Liver mass  Assessment and Plan of Treatment: Pain of Unclear Source   - We think it Could be related to History of Multi-loculated cystic L Hepatic Mass on CT 10/29/20 Versus History of Leg DVT in June 2020 Versus new UTI Versus Constipation in Elderly Versus Hematoma in setting of AC and drop in Hb  - CT AP IC (04/16): unchanged multiloculated cystic left hepatic lobe mass compared to CT 10/29/20 -> Recommend MR with contrast  - Family refusing further investigations so will not proceed with MRI imaging as recommended  - On discharge  --> Monitor for nausea and vomiting and give meds as prescribed      SECONDARY DISCHARGE DIAGNOSES  Diagnosis: DVT, lower extremity  Assessment and Plan of Treatment: For history of DVT in June 2020  - History of leg DVT in June 2020 (unsure which side or vein as there is no report on [place] or HIE). Home med Eliquis 2.5mg BID  - XR of bilateral femurs/tibia/fibula (04/16) no fractures  - On discharge  --> Continue AC with Eliquis 5mg BID    Diagnosis: Afib  Assessment and Plan of Treatment: New onset Atrial Fibrillation without RVR  - On discharge  --> For AC: continue eliquis 5mg BID  --> For rate control: continue beta blocker as prescribed    Diagnosis: GERD (gastroesophageal reflux disease)  Assessment and Plan of Treatment: GERD  - On discharge  --> Continue Protonix 40mg QD    Diagnosis: Hypertension  Assessment and Plan of Treatment:   HTN  - On discharge  --> Monitor BP closely  --> Continue BP meds as prescribed

## 2021-04-20 NOTE — PHYSICAL THERAPY INITIAL EVALUATION ADULT - GENERAL OBSERVATIONS, REHAB EVAL
PT IE attempt 2:00pm. As per conversation with GLORIA Easton, patient does not require evaluation. Patient is bed/wheelchair bound at baseline at assisted living facility. No PT indicated.

## 2021-04-20 NOTE — DISCHARGE NOTE NURSING/CASE MANAGEMENT/SOCIAL WORK - PATIENT PORTAL LINK FT
You can access the FollowMyHealth Patient Portal offered by [facility] by registering at the following website: [email or web address]. By joining [employer]’s FollowMyHealth portal, you will also be able to view your health information using other applications (apps) compatible with our system.

## 2021-04-20 NOTE — PROGRESS NOTE ADULT - ASSESSMENT
89 year old female with a PMhx of HTN, DLD, GERD, GERD, DVTs ( in 6/2020 - lower extremities - on eliquis) dementia (AAOx0), depression,  hx of covid-19 in 4/2020 was brought in for possible leg pain and abdominal pain. Pt unable to give any history due to dementia.     Failure to thrive.   Persistent A-Fib  Cystic liver mass  Advanced dementia  HTN / DL  Chronic DVT  Depression  No CHF  Hypokalemia / Hypomagnesemia              PLAN:    ·	Supportive care  ·	D/C Bang's catheter.   ·	No change in CT abd/pelvis when compared to the one done in 2020  ·	Pt has cystic liver mass. No further W/U as per family  ·	CE x 2 negative.   ·	Cont her home meds.   ·	Can D/C back to her facility    * Med rec reviewed. Time spent 31 minutes.     Progress Note Handoff    Pending (specify):  Consults_________, Tests________, Test Results_______, Other_Can D/C back to her facility________  Family discussion:  Disposition: Home___/SNF___/Other________/Unknown at this time________    Davy Feliciano MD  Spectra: [phone]

## 2021-04-20 NOTE — PROGRESS NOTE ADULT - SUBJECTIVE AND OBJECTIVE BOX
BRAYDON RAMIREZ  89y Female    CHIEF COMPLAINT:    Patient is a 89y old  Female who presents with a chief complaint of Leg pain (2021 10:47)      INTERVAL HPI/OVERNIGHT EVENTS:    Patient seen and examined. Confused. Lying comfortably in the bed. Denies any pain    ROS: All other systems are negative.    Vital Signs:    T(F): 99 (21 @ 05:32), Max: 99 (21 @ 05:32)  HR: 63 (21 @ 05:32) (63 - 71)  BP: 101/57 (21 @ 05:32) (99/55 - 106/69)  RR: 18 (21 @ 05:32) (18 - 18)  SpO2: 99% (21 @ 08:00) (95% - 99%)  I&O's Summary    2021 07:01  -  2021 07:00  --------------------------------------------------------  IN: 360 mL / OUT: 1700 mL / NET: -1340 mL    2021 07:01  -  2021 11:07  --------------------------------------------------------  IN: 345 mL / OUT: 0 mL / NET: 345 mL      Daily     Daily Weight in k (2021 05:32)  CAPILLARY BLOOD GLUCOSE          PHYSICAL EXAM:    GENERAL:  NAD  SKIN: No rashes or lesions  HENT: Atraumatic. Normocephalic. PERRL. Moist membranes.  NECK: Supple, No JVD. No lymphadenopathy.  PULMONARY: CTA B/L. No wheezing. No rales  CVS: Normal S1, S2. Rate and Rhythm are regular. No murmurs.  ABDOMEN/GI: Soft, Nontender, Nondistended; BS present  EXTREMITIES: Peripheral pulses intact. No edema B/L LE.  NEUROLOGIC:  No motor or sensory deficit.  PSYCH: Alert & oriented x 0    Consultant(s) Notes Reviewed:  [x ] YES  [ ] NO  Care Discussed with Consultants/Other Providers [ x] YES  [ ] NO    EKG reviewed  Telemetry reviewed    LABS:                        9.1    9.39  )-----------( 269      ( 2021 05:53 )             29.6     04-20    138  |  100  |  19  ----------------------------<  87  3.7   |  28  |  1.0    Ca    8.7      2021 05:53  Mg     2.5         TPro  5.9<L>  /  Alb  3.0<L>  /  TBili  0.6  /  DBili  x   /  AST  11  /  ALT  6   /  AlkPhos  99  -    PT/INR - ( 2021 07:26 )   PT: 21.40 sec;   INR: 1.86 ratio         PTT - ( 2021 07:26 )  PTT:38.9 sec  Serum Pro-Brain Natriuretic Peptide: 79484 pg/mL (21 @ 10:38)          RADIOLOGY & ADDITIONAL TESTS:      Imaging or report Personally Reviewed:  [ ] YES  [ ] NO    Medications:  Standing  apixaban 5 milliGRAM(s) Oral every 12 hours  aspirin enteric coated 81 milliGRAM(s) Oral daily  atorvastatin Oral Tab/Cap - Peds 10 milliGRAM(s) Oral daily  furosemide    Tablet 40 milliGRAM(s) Oral daily  iron sucrose IVPB 200 milliGRAM(s) IV Intermittent every 24 hours  memantine 5 milliGRAM(s) Oral two times a day  metoprolol tartrate Oral Tab/Cap - Peds 50 milliGRAM(s) Oral every 8 hours  mirtazapine 7.5 milliGRAM(s) Oral once  pantoprazole    Tablet 40 milliGRAM(s) Oral before breakfast  prednisoLONE acetate 1% Suspension 1 Drop(s) Both EYES two times a day  QUEtiapine 25 milliGRAM(s) Oral at bedtime  senna 8.6 milliGRAM(s) Oral Tablet - Peds 1 Tablet(s) Oral at bedtime    PRN Meds  acetaminophen   Tablet .. 650 milliGRAM(s) Oral every 6 hours PRN      Case discussed with resident    Care discussed with pt/family

## 2021-04-20 NOTE — DISCHARGE NOTE PROVIDER - NSDCMRMEDTOKEN_GEN_ALL_CORE_FT
Aspir 81 oral delayed release tablet: 1 tab(s) orally once a day  atorvastatin 10 mg oral tablet: 1 tab(s) orally once a day  Colace 100 mg oral capsule: 1 cap(s) orally 2 times a day  Eliquis 2.5 mg oral tablet: 1 tab(s) orally 2 times a day  hydroCHLOROthiazide 25 mg oral tablet: 1 tab(s) orally once a day  memantine 14 mg oral capsule, extended release: 1 cap(s) orally once a day  Metoprolol Tartrate 50 mg oral tablet: orally every 8 hours  mirtazapine 7.5 mg oral tablet: 2 tab(s) orally once a day (at bedtime)  pantoprazole 40 mg oral delayed release tablet: 1 tab(s) orally once a day  potassium chloride 10 mEq oral capsule, extended release: 1 cap(s) orally once a day  prednisoLONE acetate 1% ophthalmic suspension: 1 drop(s) to each affected eye 2 times a day  senna 8.6 mg oral tablet: 1 tab(s) orally once a day (at bedtime)  SEROquel 25 mg oral tablet: 1 tab(s) orally once a day (at bedtime)   Aspir 81 oral delayed release tablet: 1 tab(s) orally once a day  atorvastatin 10 mg oral tablet: 1 tab(s) orally once a day  Colace 100 mg oral capsule: 1 cap(s) orally 2 times a day  Eliquis 2.5 mg oral tablet: 1 tab(s) orally 2 times a day  hydroCHLOROthiazide 25 mg oral tablet: 1 tab(s) orally once a day  memantine 14 mg oral capsule, extended release: 1 cap(s) orally once a day  Metoprolol Tartrate 50 mg oral tablet: orally every 8 hours  mirtazapine 7.5 mg oral tablet: 2 tab(s) orally once a day (at bedtime)  pantoprazole 40 mg oral delayed release tablet: 1 tab(s) orally once a day  potassium chloride 10 mEq oral capsule, extended release: 1 cap(s) orally once a day  prednisoLONE acetate 1% ophthalmic suspension: 1 drop(s) to each affected eye 2 times a day  senna 8.6 mg oral tablet: 1 tab(s) orally once a day (at bedtime)  SEROquel 25 mg oral tablet: 1 tab(s) orally once a day (at bedtime)  Tylenol 325 mg oral tablet: 2 tab(s) orally every 6 hours  -for moderate pain

## 2021-04-20 NOTE — PROGRESS NOTE ADULT - SUBJECTIVE AND OBJECTIVE BOX
Progress Note  This morning patient was seen and examined at bedside.    Today is hospital day 14.  Ms. Smith is doing fine.   She is not oriented to time, place, or person and is not interactive.  She is no longer moaning in pain compared to Saturday.  Her appetite is reduced, but she has no nausea or vomiting.   She has a soft bowel movement on 04/17 midnight.   She is not ambulating as she is bed-ridden.   She is voiding via khan catheter.      Vital Signs in the last 24 hours   Vitals Summary T(C): 37.2 (04-20-21 @ 05:32), Max: 37.2 (04-20-21 @ 05:32)  HR: 63 (04-20-21 @ 05:32) (63 - 71)  BP: 101/57 (04-20-21 @ 05:32) (99/55 - 106/69)  RR: 18 (04-20-21 @ 05:32) (18 - 18)  SpO2: 99% (04-20-21 @ 08:00) (95% - 99%)  Vent Data   Intake/ Output   04-19-21 @ 07:01  -  04-20-21 @ 07:00  --------------------------------------------------------  IN: 360 mL / OUT: 1700 mL / NET: -1340 mL    04-20-21 @ 07:01  -  04-20-21 @ 10:50  --------------------------------------------------------  IN: 345 mL / OUT: 0 mL / NET: 345 mL      Physical Exam  * General Appearance: not Alert or oriented to time place or person, not cooperative or interactive  * Head: Normocephalic, without obvious abnormality, atraumatic  * Back: no bed sores noted  * Lungs: Respirations unlabored, Good bilateral air entry, normal breath sounds (Clear to auscultation bilaterally, no audible wheezes, crackles, or rhonchi)  * Heart: Regular Rate and Rhythm, normal S1 and S2, no audible murmur, rub, or gallop  * Abdomen: Symmetric, non-distended, soft, tenderness noted on palpation of lower quadrants and suprapubic area, bowel sounds active all four quadrants, no masses, no organomegaly (no hepatosplenomegaly)  * Extremities: Extremities normal, atraumatic, no cyanosis, no lower extremity pitting edema bilaterally, adequate dorsalis pedis pulses  * Pulses: 2+ and symmetric all extremities  * Skin: Skin color, texture, turgor normal, no rashes or lesions  * Lymph nodes: Cervical, supraclavicular, and axillary nodes normal      Investigations   Laboratory Workup  - CBC:                        9.1    9.39  )-----------( 269      ( 20 Apr 2021 05:53 )             29.6     - Chemistry:  04-20    138  |  100  |  19  ----------------------------<  87  3.7   |  28  |  1.0    Ca    8.7      20 Apr 2021 05:53  Mg     2.5     04-20    TPro  5.9<L>  /  Alb  3.0<L>  /  TBili  0.6  /  DBili  x   /  AST  11  /  ALT  6   /  AlkPhos  99  04-20    - Coagulation Studies:  PT/INR - ( 19 Apr 2021 07:26 )   PT: 21.40 sec;   INR: 1.86 ratio    PTT - ( 19 Apr 2021 07:26 )  PTT:38.9 sec      Current Medications  Standing Medications  apixaban 5 milliGRAM(s) Oral every 12 hours  aspirin enteric coated 81 milliGRAM(s) Oral daily  atorvastatin Oral Tab/Cap - Peds 10 milliGRAM(s) Oral daily  furosemide    Tablet 40 milliGRAM(s) Oral daily  iron sucrose IVPB 200 milliGRAM(s) IV Intermittent every 24 hours  memantine 5 milliGRAM(s) Oral two times a day  metoprolol tartrate Oral Tab/Cap - Peds 50 milliGRAM(s) Oral every 8 hours  mirtazapine 7.5 milliGRAM(s) Oral once  pantoprazole    Tablet 40 milliGRAM(s) Oral before breakfast  prednisoLONE acetate 1% Suspension 1 Drop(s) Both EYES two times a day  QUEtiapine 25 milliGRAM(s) Oral at bedtime  senna 8.6 milliGRAM(s) Oral Tablet - Peds 1 Tablet(s) Oral at bedtime    PRN Medications  acetaminophen   Tablet .. 650 milliGRAM(s) Oral every 6 hours PRN Temp greater or equal to 38C (100.4F), Mild Pain (1 - 3)    Singles Doses Administered  (Floorstock) 1 each &lt;see task&gt; GiveOnce  furosemide   Injectable 40 milliGRAM(s) IV Push Once  magnesium sulfate  IVPB 2 Gram(s) IV Intermittent every 2 hours  metoprolol tartrate 12.5 milliGRAM(s) Oral once  potassium chloride  20 mEq/100 mL IVPB 20 milliEquivalent(s) IV Intermittent every 2 hours  potassium chloride  20 mEq/100 mL IVPB 20 milliEquivalent(s) IV Intermittent every 2 hours

## 2021-04-30 DIAGNOSIS — E78.5 HYPERLIPIDEMIA, UNSPECIFIED: ICD-10-CM

## 2021-04-30 DIAGNOSIS — I27.20 PULMONARY HYPERTENSION, UNSPECIFIED: ICD-10-CM

## 2021-04-30 DIAGNOSIS — I50.33 ACUTE ON CHRONIC DIASTOLIC (CONGESTIVE) HEART FAILURE: ICD-10-CM

## 2021-04-30 DIAGNOSIS — I48.19 OTHER PERSISTENT ATRIAL FIBRILLATION: ICD-10-CM

## 2021-04-30 DIAGNOSIS — G30.9 ALZHEIMER'S DISEASE, UNSPECIFIED: ICD-10-CM

## 2021-04-30 DIAGNOSIS — D64.9 ANEMIA, UNSPECIFIED: ICD-10-CM

## 2021-04-30 DIAGNOSIS — F32.9 MAJOR DEPRESSIVE DISORDER, SINGLE EPISODE, UNSPECIFIED: ICD-10-CM

## 2021-04-30 DIAGNOSIS — K76.89 OTHER SPECIFIED DISEASES OF LIVER: ICD-10-CM

## 2021-04-30 DIAGNOSIS — Z86.718 PERSONAL HISTORY OF OTHER VENOUS THROMBOSIS AND EMBOLISM: ICD-10-CM

## 2021-04-30 DIAGNOSIS — Z86.16 PERSONAL HISTORY OF COVID-19: ICD-10-CM

## 2021-04-30 DIAGNOSIS — Z79.01 LONG TERM (CURRENT) USE OF ANTICOAGULANTS: ICD-10-CM

## 2021-04-30 DIAGNOSIS — I44.7 LEFT BUNDLE-BRANCH BLOCK, UNSPECIFIED: ICD-10-CM

## 2021-04-30 DIAGNOSIS — Z95.0 PRESENCE OF CARDIAC PACEMAKER: ICD-10-CM

## 2021-04-30 DIAGNOSIS — I11.0 HYPERTENSIVE HEART DISEASE WITH HEART FAILURE: ICD-10-CM

## 2021-04-30 DIAGNOSIS — Z79.82 LONG TERM (CURRENT) USE OF ASPIRIN: ICD-10-CM

## 2021-04-30 DIAGNOSIS — E83.42 HYPOMAGNESEMIA: ICD-10-CM

## 2021-04-30 DIAGNOSIS — N39.0 URINARY TRACT INFECTION, SITE NOT SPECIFIED: ICD-10-CM

## 2021-04-30 DIAGNOSIS — I08.2 RHEUMATIC DISORDERS OF BOTH AORTIC AND TRICUSPID VALVES: ICD-10-CM

## 2021-04-30 DIAGNOSIS — K21.9 GASTRO-ESOPHAGEAL REFLUX DISEASE WITHOUT ESOPHAGITIS: ICD-10-CM

## 2021-04-30 DIAGNOSIS — K59.00 CONSTIPATION, UNSPECIFIED: ICD-10-CM

## 2021-04-30 DIAGNOSIS — F02.80 DEMENTIA IN OTHER DISEASES CLASSIFIED ELSEWHERE, UNSPECIFIED SEVERITY, WITHOUT BEHAVIORAL DISTURBANCE, PSYCHOTIC DISTURBANCE, MOOD DISTURBANCE, AND ANXIETY: ICD-10-CM

## 2021-04-30 DIAGNOSIS — R62.7 ADULT FAILURE TO THRIVE: ICD-10-CM

## 2021-04-30 DIAGNOSIS — R10.9 UNSPECIFIED ABDOMINAL PAIN: ICD-10-CM

## 2021-04-30 DIAGNOSIS — E87.6 HYPOKALEMIA: ICD-10-CM

## 2021-04-30 DIAGNOSIS — Z66 DO NOT RESUSCITATE: ICD-10-CM

## 2021-06-18 ENCOUNTER — INPATIENT (INPATIENT)
Facility: HOSPITAL | Age: 86
LOS: 1 days | Discharge: NURSING, REHAB OR LONG-TERM CARE | End: 2021-06-20
Attending: HOSPITALIST | Admitting: HOSPITALIST
Payer: MEDICARE

## 2021-06-18 VITALS
RESPIRATION RATE: 20 BRPM | SYSTOLIC BLOOD PRESSURE: 124 MMHG | HEART RATE: 83 BPM | TEMPERATURE: 98 F | DIASTOLIC BLOOD PRESSURE: 63 MMHG | OXYGEN SATURATION: 97 %

## 2021-06-18 LAB
ALBUMIN SERPL ELPH-MCNC: 3.9 G/DL — SIGNIFICANT CHANGE UP (ref 3.5–5.2)
ALP SERPL-CCNC: 126 U/L — HIGH (ref 30–115)
ALT FLD-CCNC: 6 U/L — SIGNIFICANT CHANGE UP (ref 0–41)
ANION GAP SERPL CALC-SCNC: 11 MMOL/L — SIGNIFICANT CHANGE UP (ref 7–14)
AST SERPL-CCNC: 10 U/L — SIGNIFICANT CHANGE UP (ref 0–41)
BASOPHILS # BLD AUTO: 0.04 K/UL — SIGNIFICANT CHANGE UP (ref 0–0.2)
BASOPHILS NFR BLD AUTO: 0.5 % — SIGNIFICANT CHANGE UP (ref 0–1)
BILIRUB SERPL-MCNC: 0.7 MG/DL — SIGNIFICANT CHANGE UP (ref 0.2–1.2)
BUN SERPL-MCNC: 20 MG/DL — SIGNIFICANT CHANGE UP (ref 10–20)
CALCIUM SERPL-MCNC: 9.2 MG/DL — SIGNIFICANT CHANGE UP (ref 8.5–10.1)
CHLORIDE SERPL-SCNC: 98 MMOL/L — SIGNIFICANT CHANGE UP (ref 98–110)
CO2 SERPL-SCNC: 28 MMOL/L — SIGNIFICANT CHANGE UP (ref 17–32)
CREAT SERPL-MCNC: 0.8 MG/DL — SIGNIFICANT CHANGE UP (ref 0.7–1.5)
EOSINOPHIL # BLD AUTO: 0.1 K/UL — SIGNIFICANT CHANGE UP (ref 0–0.7)
EOSINOPHIL NFR BLD AUTO: 1.3 % — SIGNIFICANT CHANGE UP (ref 0–8)
GLUCOSE SERPL-MCNC: 88 MG/DL — SIGNIFICANT CHANGE UP (ref 70–99)
HCT VFR BLD CALC: 33.8 % — LOW (ref 37–47)
HGB BLD-MCNC: 10.6 G/DL — LOW (ref 12–16)
IMM GRANULOCYTES NFR BLD AUTO: 0.4 % — HIGH (ref 0.1–0.3)
LYMPHOCYTES # BLD AUTO: 1.2 K/UL — SIGNIFICANT CHANGE UP (ref 1.2–3.4)
LYMPHOCYTES # BLD AUTO: 15.5 % — LOW (ref 20.5–51.1)
MCHC RBC-ENTMCNC: 25.7 PG — LOW (ref 27–31)
MCHC RBC-ENTMCNC: 31.4 G/DL — LOW (ref 32–37)
MCV RBC AUTO: 81.8 FL — SIGNIFICANT CHANGE UP (ref 81–99)
MONOCYTES # BLD AUTO: 0.66 K/UL — HIGH (ref 0.1–0.6)
MONOCYTES NFR BLD AUTO: 8.5 % — SIGNIFICANT CHANGE UP (ref 1.7–9.3)
NEUTROPHILS # BLD AUTO: 5.71 K/UL — SIGNIFICANT CHANGE UP (ref 1.4–6.5)
NEUTROPHILS NFR BLD AUTO: 73.8 % — SIGNIFICANT CHANGE UP (ref 42.2–75.2)
NRBC # BLD: 0 /100 WBCS — SIGNIFICANT CHANGE UP (ref 0–0)
NT-PROBNP SERPL-SCNC: 8436 PG/ML — HIGH (ref 0–300)
PLATELET # BLD AUTO: 246 K/UL — SIGNIFICANT CHANGE UP (ref 130–400)
POTASSIUM SERPL-MCNC: 3.7 MMOL/L — SIGNIFICANT CHANGE UP (ref 3.5–5)
POTASSIUM SERPL-SCNC: 3.7 MMOL/L — SIGNIFICANT CHANGE UP (ref 3.5–5)
PROT SERPL-MCNC: 7 G/DL — SIGNIFICANT CHANGE UP (ref 6–8)
RBC # BLD: 4.13 M/UL — LOW (ref 4.2–5.4)
RBC # FLD: 19.2 % — HIGH (ref 11.5–14.5)
SARS-COV-2 RNA SPEC QL NAA+PROBE: SIGNIFICANT CHANGE UP
SODIUM SERPL-SCNC: 137 MMOL/L — SIGNIFICANT CHANGE UP (ref 135–146)
TROPONIN T SERPL-MCNC: <0.01 NG/ML — SIGNIFICANT CHANGE UP
WBC # BLD: 7.74 K/UL — SIGNIFICANT CHANGE UP (ref 4.8–10.8)
WBC # FLD AUTO: 7.74 K/UL — SIGNIFICANT CHANGE UP (ref 4.8–10.8)

## 2021-06-18 PROCEDURE — 93010 ELECTROCARDIOGRAM REPORT: CPT

## 2021-06-18 PROCEDURE — 36000 PLACE NEEDLE IN VEIN: CPT | Mod: GC

## 2021-06-18 PROCEDURE — 99223 1ST HOSP IP/OBS HIGH 75: CPT | Mod: AI

## 2021-06-18 PROCEDURE — 71045 X-RAY EXAM CHEST 1 VIEW: CPT | Mod: 26

## 2021-06-18 PROCEDURE — 99285 EMERGENCY DEPT VISIT HI MDM: CPT | Mod: 25,GC

## 2021-06-18 PROCEDURE — 93970 EXTREMITY STUDY: CPT | Mod: 26

## 2021-06-18 RX ORDER — ASPIRIN/CALCIUM CARB/MAGNESIUM 324 MG
81 TABLET ORAL DAILY
Refills: 0 | Status: DISCONTINUED | OUTPATIENT
Start: 2021-06-18 | End: 2021-06-20

## 2021-06-18 RX ORDER — HYDROCHLOROTHIAZIDE 25 MG
25 TABLET ORAL DAILY
Refills: 0 | Status: DISCONTINUED | OUTPATIENT
Start: 2021-06-18 | End: 2021-06-18

## 2021-06-18 RX ORDER — FUROSEMIDE 40 MG
40 TABLET ORAL EVERY 12 HOURS
Refills: 0 | Status: DISCONTINUED | OUTPATIENT
Start: 2021-06-18 | End: 2021-06-18

## 2021-06-18 RX ORDER — LOSARTAN POTASSIUM 100 MG/1
75 TABLET, FILM COATED ORAL DAILY
Refills: 0 | Status: DISCONTINUED | OUTPATIENT
Start: 2021-06-18 | End: 2021-06-20

## 2021-06-18 RX ORDER — MEMANTINE HYDROCHLORIDE 10 MG/1
10 TABLET ORAL DAILY
Refills: 0 | Status: DISCONTINUED | OUTPATIENT
Start: 2021-06-18 | End: 2021-06-20

## 2021-06-18 RX ORDER — PREDNISOLONE SODIUM PHOSPHATE 1 %
1 DROPS OPHTHALMIC (EYE)
Refills: 0 | Status: DISCONTINUED | OUTPATIENT
Start: 2021-06-18 | End: 2021-06-20

## 2021-06-18 RX ORDER — QUETIAPINE FUMARATE 200 MG/1
1 TABLET, FILM COATED ORAL
Qty: 0 | Refills: 0 | DISCHARGE

## 2021-06-18 RX ORDER — MIRTAZAPINE 45 MG/1
15 TABLET, ORALLY DISINTEGRATING ORAL AT BEDTIME
Refills: 0 | Status: DISCONTINUED | OUTPATIENT
Start: 2021-06-18 | End: 2021-06-20

## 2021-06-18 RX ORDER — PANTOPRAZOLE SODIUM 20 MG/1
40 TABLET, DELAYED RELEASE ORAL
Refills: 0 | Status: DISCONTINUED | OUTPATIENT
Start: 2021-06-18 | End: 2021-06-20

## 2021-06-18 RX ORDER — ACETAMINOPHEN 500 MG
650 TABLET ORAL EVERY 6 HOURS
Refills: 0 | Status: DISCONTINUED | OUTPATIENT
Start: 2021-06-18 | End: 2021-06-20

## 2021-06-18 RX ORDER — FUROSEMIDE 40 MG
40 TABLET ORAL ONCE
Refills: 0 | Status: COMPLETED | OUTPATIENT
Start: 2021-06-18 | End: 2021-06-18

## 2021-06-18 RX ORDER — CHLORHEXIDINE GLUCONATE 213 G/1000ML
1 SOLUTION TOPICAL
Refills: 0 | Status: DISCONTINUED | OUTPATIENT
Start: 2021-06-18 | End: 2021-06-20

## 2021-06-18 RX ORDER — SENNA PLUS 8.6 MG/1
1 TABLET ORAL AT BEDTIME
Refills: 0 | Status: DISCONTINUED | OUTPATIENT
Start: 2021-06-18 | End: 2021-06-20

## 2021-06-18 RX ORDER — APIXABAN 2.5 MG/1
2.5 TABLET, FILM COATED ORAL EVERY 12 HOURS
Refills: 0 | Status: DISCONTINUED | OUTPATIENT
Start: 2021-06-18 | End: 2021-06-20

## 2021-06-18 RX ORDER — FUROSEMIDE 40 MG
40 TABLET ORAL DAILY
Refills: 0 | Status: DISCONTINUED | OUTPATIENT
Start: 2021-06-18 | End: 2021-06-19

## 2021-06-18 RX ORDER — ATORVASTATIN CALCIUM 80 MG/1
10 TABLET, FILM COATED ORAL AT BEDTIME
Refills: 0 | Status: DISCONTINUED | OUTPATIENT
Start: 2021-06-18 | End: 2021-06-20

## 2021-06-18 RX ORDER — METOPROLOL TARTRATE 50 MG
50 TABLET ORAL EVERY 8 HOURS
Refills: 0 | Status: DISCONTINUED | OUTPATIENT
Start: 2021-06-18 | End: 2021-06-20

## 2021-06-18 RX ORDER — PREDNISOLONE SODIUM PHOSPHATE 1 %
1 DROPS OPHTHALMIC (EYE)
Qty: 0 | Refills: 0 | DISCHARGE

## 2021-06-18 RX ORDER — MORPHINE SULFATE 50 MG/1
1 CAPSULE, EXTENDED RELEASE ORAL ONCE
Refills: 0 | Status: DISCONTINUED | OUTPATIENT
Start: 2021-06-18 | End: 2021-06-18

## 2021-06-18 RX ORDER — POTASSIUM CHLORIDE 20 MEQ
10 PACKET (EA) ORAL DAILY
Refills: 0 | Status: DISCONTINUED | OUTPATIENT
Start: 2021-06-18 | End: 2021-06-18

## 2021-06-18 RX ADMIN — ATORVASTATIN CALCIUM 10 MILLIGRAM(S): 80 TABLET, FILM COATED ORAL at 21:24

## 2021-06-18 RX ADMIN — SENNA PLUS 1 TABLET(S): 8.6 TABLET ORAL at 21:25

## 2021-06-18 RX ADMIN — MORPHINE SULFATE 1 MILLIGRAM(S): 50 CAPSULE, EXTENDED RELEASE ORAL at 21:45

## 2021-06-18 RX ADMIN — APIXABAN 2.5 MILLIGRAM(S): 2.5 TABLET, FILM COATED ORAL at 18:36

## 2021-06-18 RX ADMIN — MORPHINE SULFATE 1 MILLIGRAM(S): 50 CAPSULE, EXTENDED RELEASE ORAL at 22:51

## 2021-06-18 RX ADMIN — Medication 1 DROP(S): at 18:36

## 2021-06-18 RX ADMIN — Medication 40 MILLIGRAM(S): at 14:49

## 2021-06-18 RX ADMIN — Medication 50 MILLIGRAM(S): at 21:25

## 2021-06-18 RX ADMIN — MIRTAZAPINE 15 MILLIGRAM(S): 45 TABLET, ORALLY DISINTEGRATING ORAL at 21:24

## 2021-06-18 NOTE — ED ADULT TRIAGE NOTE - ACCOMPANIED BY
ANNUAL PHYSICAL EXAM    ASSESSMENT/PLAN:  Annual physical exam  Counseled on diet exercise and weight loss. Counseled on sunscreen. Set a goal of losing 10-15 pounds in the next 6 months. Suggested a goal of 150 minutes of cardio exercise per week. Health maintenance list below updated. Reminded him to have an eye exam future.    Health Maintenance   Topic Date Due   • Depression Screening  12/14/1977   • Diabetes Foot Exam  05/02/2018   • Diabetes Eye Exam  05/19/2018   • Influenza Vaccine (Season Ended) 09/01/2018   • Diabetes A1C  11/01/2018   • Diabetes GFR  05/01/2019   • DTaP/Tdap/Td Vaccine (2 - Td) 04/06/2027   • Colorectal Cancer Screening-Colonoscopy  06/29/2027   • Hepatitis C Screening  Completed         Type 2 diabetes mellitus without complication, without long-term current use of insulin (CMS/[facility])  A1c 6.5. Diabetes under good control without evidence of neuropathy and nephropathy or retinopathy. Continue metformin 1000 mg b.i.d.  - GLYCOHEMOGLOBIN; Future    Benign essential hypertension  His blood pressures a little bit on the low side today. Let's try cutting back on his lisinopril hydrochlorothiazide by 50% to 10 mg/12.5 mg.    Mixed hyperlipidemia  LDL at goal. Triglycerides borderline. Counseled on diet. Continue simvastatin.    FRANK (generalized anxiety disorder)  Using alprazolam sparingly and appropriately. I'll continue. [place] [facility]IT[facility]ING PRO[facility]:  Reports are compliant with drug, quantity, prescribe, dispenser, and recipient history.            Return in about 6 months (around 11/8/2018) for lab pta, diabetes.      Chief Complaint   Patient presents with   • Physical     annual physical exam       HISTORY OF PRESENT ILLNESS  Jarek Ponce is a 52 year old male that presents for annual physical. Concerns discussed today include:    1. Type 2 diabetes: Diagnosed in 2016. He does check his blood sugars 3 or 4 times a month and is always in the 120 range. No hypoglycemic  annabelle. He is on metformin 1000 mg b.i.d. No weight change in the past year. He admits he has not been exercising much. Denies numbness or burning of his feet. Last eye exam was one year ago.  2. History of mild anxiety disorder. He has a standing prescription of alprazolam that he uses sparingly. He denies being depressed. He sleeps well at night. No significant irritability. Concentration is good.  3. Hypertension: He does notice that he gets a little lightheaded when he stands up frequent such as gardening. He is on lisinopril 20 hydrochlorothiazide 25 daily. He does not check his blood pressures.Denies any cardiac symptoms of chest pain, palpitations, pedal edema or shortness of breath.   4. He is on simvastatin for hyperlipidemia. Tolerates medication without myalgias or weakness. He is conscious of but not always compliant with diet.        HISTORIES    ALLERGIES:  No Known Allergies    Patient Active Problem List    Diagnosis Date Noted   • Benign essential hypertension 09/20/2016     Priority: Medium   • Type 2 diabetes mellitus without complication, without long-term current use of insulin (CMS/[facility]) 10/28/2016     Priority: Low   • Adult-onset obesity 09/20/2016     Priority: Low   • Genital warts 09/20/2016     Priority: Low   • Accessory skin tags 09/20/2016     Priority: Low   • FRANK (generalized anxiety disorder) 09/20/2016     Priority: Low   • Mixed hyperlipidemia 09/20/2016     Priority: Low       Outpatient Medications Prior to Visit   Medication Sig Dispense Refill   • simvastatin (ZOCOR) 20 MG tablet Take 1 tablet by mouth nightly. 90 tablet 3   • metformin (GLUCOPHAGE) 1000 MG tablet Take 1 tablet by mouth 2 times daily (with meals). 180 tablet 3   • blood glucose meter Test blood sugar once daily as directed. Diagnosis: type 2 diabetes. Meter: anything insurance will cover 1 kit 0   • blood glucose test strip Test blood sugar once daily as directed. Diagnosis: type 2 diabetes. Test Strips:  anything insurance will cover 100 strip 1   • blood glucose lancets Test blood sugar once daily as directed. Diagnosis: type 2 diabetes. Lancets: anything insurance will cover 100 each 3   • imiquimod (ALDARA) 5 % cream Apply topically three times a week. Apply to genital wars 12 each 0   • ALPRAZolam (XANAX) 0.5 MG tablet Take 1 tablet by mouth 2 times daily as needed for Sleep or Anxiety. 60 tablet 1   • lisinopril-hydroCHLOROthiazide (PRINZIDE,ZESTORETIC) 20-25 MG per tablet Take 1 tablet by mouth daily. 90 tablet 3     No facility-administered medications prior to visit.        Past Surgical History:   Procedure Laterality Date   • Colonoscopy  06/30/2017    hyperplastic polyp   • Tonsillectomy         Social History   Substance Use Topics   • Smoking status: Former Smoker     Quit date: 1/1/2012   • Smokeless tobacco: Never Used   • Alcohol use 0.0 oz/week      Comment: socially        Family History   Problem Relation Age of Onset   • Thyroid Mother    • Diabetes Father    • High blood pressure Father    • High blood pressure Sister    • High cholesterol Sister    • Cancer Maternal Grandmother    • Cancer Paternal Grandmother        REVIEW OF SYSTEMS  A 16 system ROS conducted with the use of our standard yellow form was performed and I personally reviewed with the patient. Significant positives are covered in the history of present illness or below:    He has a long-term male sexual partner. Declines HIV screening. He feels he is at low risk.    Most Recent PHQ 2/9 Score     Date               - 5/8/2018     PHQ2 Score           - 0         PHQ9 Total Score  - No Value exists for the : [facility]#[id number]      PHYSICAL EXAM  Visit Vitals  BP 98/64   Pulse 72   Resp 16   Ht 6' 0.5\" (1.842 m)   Wt 107 kg   BMI 31.57 kg/m²     Constitutional: Well developed, obese (BMI 30 - 34.9), in no acute distress.  Skin: Warm and dry. No rashes or suspicious nevi noted. He has a benign seborrheic keratosis on his left forehead and  a benign pedunculated nevus on his right lower abdomen that he asked me to examine.  HEENT: PERRLA, conjunctiva and lids normal, no scleral icterus. Bilateral TM’s clear and mobile without erythema, fluid or bulging. Pharynx without erythema or exudate. No oral masses or lesions noted. Mucosa moist. Dentition in good condition.  Neck: Normal range of motion, and neck is supple. No thyromegaly noted. No masses in neck.  Lungs: Clear to auscultation and percussion. Chest AP diameter is normal.  Cardiac: Regular rhythm without murmur or gallop. PMI is not displaced. There is no jugular venous distention. There is no pedal edema. No abdominal bruits were noted. His initial blood pressure noted. I had a repeat reading of 112/60 without orthostatic drop.  Diabetic Foot Exam Documentation     Normal Bilateral Foot Exam: Skin integrity is normal. Dorsalis pedis and posterior tibial pulses are present.  Pressure sensation using the [place] monofilament is present.    GI: Soft, nondistended, normal bowel sounds. Liver and spleen are not palpable. No tenderness, masses, rebound or guarding. No umbilical hernia.  : No flank tenderness  Musculoskeletal: Moves all extremities well. No deformities noted. Normal strength in all extremities. No atrophy noted. No joint swelling noted.  Lymphatic: No cervical or supraclavicular or axillary adenopathy.  Neurologic: Alert and oriented X 3. Normal sensory function in all extremities. No focal deficits. No apraxia or ataxia. Gait and stance are normal.  Psychiatric: Speech and behavior are normal. Mood euthymic and affect appropriate. Cognition appropriate for age.         LAB  Hemoglobin A1C (%)   Date Value   05/01/2018 6.5 (H)   11/01/2017 6.4 (H)     CHOLESTEROL (mg/dL)   Date Value   05/01/2018 107   04/25/2017 125     HDL (mg/dL)   Date Value   05/01/2018 31 (L)   04/25/2017 36 (L)     CALCULATED LDL (mg/dL)   Date Value   05/01/2018 45   04/25/2017 63     TRIGLYCERIDE  (mg/dL)   Date Value   05/01/2018 153 (H)   04/25/2017 128     No results found for: LDLDIR  Creatinine (mg/dL)   Date Value   05/01/2018 0.95   11/01/2017 0.97     MICROALBUMIN,UA (TTL) (ug/dL)   Date Value   07/22/2016 <200     MICROALBUMIN, UA (TTL) (mg/dL)   Date Value   05/01/2018 3.30   04/25/2017 0.68     MICROALBUMIN/CREAT (ug/mg)   Date Value   07/22/2016 <9     MICROALBUMIN/CREATININE   Date Value   05/01/2018 11.5 mg/g   04/25/2017 4.4 mcg/mg       Visit Blood Pressure:   11/08/2017   108/72  5/23/2017   118/59        DIAGNOSTICS  None    Kaiden Cleary MD   EMT/paramedic

## 2021-06-18 NOTE — ED PROVIDER NOTE - PHYSICAL EXAMINATION
CONSTITUTIONAL: Well-developed; well-nourished; in no acute distress.   SKIN: warm, dry  HEAD: Normocephalic; atraumatic.  EYES: PERRL, EOMI, normal sclera and conjunctiva   ENT: No nasal discharge; airway clear.  NECK: Supple; non tender.  CARD: S1, S2 normal; no murmurs, gallops, or rubs. Regular rate and rhythm.   RESP: No wheezes, rales or rhonchi.  ABD: soft ntnd  EXT: Normal ROM.  b/l LE edema  LYMPH: No acute cervical adenopathy.  NEURO: aaox0  PSYCH: Cooperative, appropriate.

## 2021-06-18 NOTE — ED PROCEDURE NOTE - ATTENDING CONTRIBUTION TO CARE
I was present for and supervised the key and critical aspects of the procedures performed during the care of the patient. I was present for and supervised the key and critical aspects of the procedures performed during the care of the patient.    I personally supervised the study. I reviewed the images and interpretation by the ACP/Resident and have edited where appropriate.

## 2021-06-18 NOTE — ED ADULT TRIAGE NOTE - CHIEF COMPLAINT QUOTE
Sent from [facility] home with bilateral lower extremity pain x this morning Hx dementia

## 2021-06-18 NOTE — H&P ADULT - ASSESSMENT
89y Female with PMH of HFmEF (43% in April 2021), HTN, HLD, GERD, DVT (in 6/2020 - on Eliquis), dementia (AAOx0), depression, COVID19 in 4/2020 who presents from [facility] nurse (Jony, cell # [phone]) with hypoxia to 86 on RA     89y Female with PMH of HFmEF (43% in April 2021), HTN, HLD, GERD, DVT (in 6/2020 - on Eliquis), dementia (AAOx0), depression, COVID19 in 4/2020 who presents from [facility] nurse (Jony, cell # [phone]) with hypoxia to 86 on RA    #Acute on Chronic HF exacerbation  #Chronic A fib  #HTN  #HLD  #HO DVT  - TTE 04/2021 showed 43% moderate MR, moderate AR, moderate PAH  - BNP > 8000 Trops -ve  - Start IV lasix -- mg BID  - c/w     #Dementia  -    #Depression  -     DVT ppx - lovenox SC  Diet - DASH  Activity - As tolerated  Code -  89y Female with PMH of HFmEF (43% in April 2021), HTN, HLD, GERD, DVT (in 6/2020 - on Eliquis), dementia (AAOx0), depression, COVID19 in 4/2020 who presents from [facility] nurse (Jony, cell # [phone]) with hypoxia to 86 on RA    #Acute on Chronic HF exacerbation  #Chronic A fib  #HTN  #HLD  #HO DVT  - TTE 04/2021 showed 43% moderate MR, moderate AR, moderate PAH  - BNP > 8000 Trops -ve  - Start IV lasix -- mg BID  - c/w     #Dementia  -    #Depression  -     DVT ppx - lovenox SC  Diet - DASH  Activity - As tolerated  Code - DNR/DNI 89y Female with PMH of HFmEF (43% in April 2021), HTN, HLD, GERD, DVT (in 6/2020 - on Eliquis), dementia (AAOx0), depression, COVID19 in 4/2020 who presents from [facility] nurse (Jony, cell # [phone]) with hypoxia to 86 on RA    #Acute on Chronic HF exacerbation  #Chronic A fib  #HTN  #HLD  #HO DVT  - TTE 04/2021 showed 43% moderate MR, moderate AR, moderate PAH  - BNP > 8000 Trops -ve  - Start IV lasix 40 mg BID  - c/w metoprolol, ASA, losartan, hctz, eliquis, statin, potassium PO    #Liver mass  - ALP elevated  - monitor for transaminitis    #Dementia  - c/w memantine    #Depression  - not on any medication from [facility] medications list  - c/w mirtazapine    #GERD  - c/w protonix    #Constipation  - c/w colase, senna    #Dry eyes  - c/w prednisolone ophthalmic    #Chronic pain  - c/w tylenol PRN    DVT ppx - lovenox SC  Diet - DASH  GI ppx - on protonix  Activity - As tolerated  Code - DNR/DNI 89y Female with PMH of HFmEF (43% in April 2021), HTN, HLD, GERD, DVT (in 6/2020 - on Eliquis), dementia (AAOx0), depression, COVID19 in 4/2020 who presents from [facility] nurse (Jony, cell # [phone]) with hypoxia to 86 on RA    #Acute on Chronic HF exacerbation  #Chronic A fib  #HTN  #HLD  #HO DVT  - TTE 04/2021 showed 43% moderate MR, moderate AR, moderate PAH  - BNP > 8000 Trops -ve  - Start IV lasix 40 mg BID  - c/w metoprolol, ASA, losartan, hctz, eliquis, statin, potassium PO    #Liver mass  - ALP elevated  - monitor for transaminitis    #Dementia  - c/w memantine    #Depression  - not on any medication from [facility] medications list  - c/w mirtazapine    #GERD  - c/w protonix    #Constipation  - c/w colase, senna    #Dry eyes  - c/w prednisolone ophthalmic    #Chronic pain  - c/w tylenol PRN    DVT ppx - eliquis  Diet - DASH  GI ppx - on protonix  Activity - As tolerated  Code - DNR/DNI 89y Female with PMH of HFmEF (43% in April 2021), HTN, HLD, GERD, DVT (in 6/2020 - on Eliquis), dementia (AAOx0), depression, COVID19 in 4/2020 who presents from [facility] nurse (Jony, cell # [phone]) with hypoxia to 86 on RA    #Acute on Chronic HF exacerbation  #Chronic A fib  #HTN  #HLD  #HO DVT  - TTE 04/2021 showed 43% moderate MR, moderate AR, moderate PAH  - BNP > 8000 Trops -ve  - Start IV lasix 40 mg BID  - c/w metoprolol, ASA, losartan, hctz, eliquis, statin, potassium PO  - daily k/mg    #Liver mass  - ALP elevated  - monitor for transaminitis    #Dementia  - c/w memantine    #Depression  - not on any medication from [facility] medications list  - c/w mirtazapine    #GERD  - c/w protonix    #Constipation  - c/w colase, senna    #Dry eyes  - c/w prednisolone ophthalmic    #Chronic pain  - c/w tylenol PRN    DVT ppx - eliquis  Diet - DASH  GI ppx - on protonix  Activity - As tolerated  Code - DNR/DNI 89y Female with PMH of HFmEF (43% in April 2021), HTN, HLD, GERD, DVT (in 6/2020 - on Eliquis), dementia (AAOx0), depression, COVID19 in 4/2020 who presents from [facility] nurse (Jony, cell # [phone]) with hypoxia to 86 on RA    #Acute on Chronic HF exacerbation  #Chronic A fib  #HTN  #HLD  #HO DVT  - TTE 04/2021 showed 43% moderate MR, moderate AR, moderate PAH  - BNP > 8000 Trops -ve  - hctz held. Start IV lasix 40 mg QD  - c/w metoprolol, ASA, losartan, eliquis, statin, potassium PO  - daily k/mg    #Liver mass  - ALP elevated  - monitor for transaminitis    #Dementia  - c/w memantine    #Depression  - not on any medication from [facility] medications list  - c/w mirtazapine    #GERD  - c/w protonix    #Constipation  - c/w colase, senna    #Dry eyes  - c/w prednisolone ophthalmic    #Chronic pain  - c/w tylenol PRN    DVT ppx - eliquis  Diet - DASH  GI ppx - on protonix  Activity - As tolerated  Code - DNR/DNI

## 2021-06-18 NOTE — H&P ADULT - ATTENDING COMMENTS
89 yr old female presented with c/o SOB.  VITAL SIGNS (Last 24 hrs):  T(C): 35.9 (06-18-21 @ 15:32), Max: 36.8 (06-18-21 @ 11:53)  HR: 82 (06-18-21 @ 15:32) (82 - 83)  BP: 139/70 (06-18-21 @ 15:32) (124/63 - 139/70)  RR: 18 (06-18-21 @ 15:32) (18 - 20)  SpO2: 98% (06-18-21 @ 15:32) (97% - 98%)  Wt(kg): --  Daily     Daily     I&O's Summary                        10.6   7.74  )-----------( 246      ( 18 Jun 2021 13:07 )             33.8   06-18    137  |  98  |  20  ----------------------------<  88  3.7   |  28  |  0.8    Ca    9.2      18 Jun 2021 13:07    TPro  7.0  /  Alb  3.9  /  TBili  0.7  /  DBili  x   /  AST  10  /  ALT  6   /  AlkPhos  126<H>  06-18  ekg-- afib rate 87/min  o/e  aaox0  chest-- basal crackles  cvs-s1s2n  abd/gi--soft, bs+   edema++  ASSESSMENT AND PLAN:  # AC systolic CHF-- EF 43% in April 2021-- no need for echo-- will do lasix 40mg once daily. Moderate AR on echo  # Hx of DVT-- continue eliquis-- duplex is negative  # Advanced dementia-- takes seroquel at night, also on memantidine and miratzapine.  # multiloculated cystic liver mass- patient looks healthy and liver enz are normal.

## 2021-06-18 NOTE — ED ADULT NURSE NOTE - OBJECTIVE STATEMENT
pt disoriented - per nephew pt is sob/per nh pt had b/l le pain  - pt has no complaints at this time

## 2021-06-18 NOTE — ED ADULT NURSE NOTE - CHIEF COMPLAINT QUOTE
Sent from [facility] home with bilateral lower extremity pain x this morning Hx dementia

## 2021-06-18 NOTE — ED PROVIDER NOTE - OBJECTIVE STATEMENT
89 yo female hx of dementia AAOx0, DVT, htn, hld presenting from [facility] for hypoxia. As per nursing home, pt was sating 86% on RA today and b/l leg swelling. Pt sating 100% in ED on RA with no tachypnea. Pt unable to give further history.

## 2021-06-18 NOTE — ED ADULT NURSE NOTE - PAIN RATING/NUMBER SCALE (0-10): ACTIVITY
0 CORTISONE INJECTION CARE    The injection site should never get red, hot, or swollen and if it does the patient will contact our office right away. The patient may experience a increase in soreness the first 24-48 hours due to a cortisone flair and can take anti-inflammatories for a short period of time to reduce that soreness. The patient should not submerge the injection site in water for a minimum of 24 hours to avoid infection. This means no lakes, pools, ponds, or hot tubs for 24 hours. If the patient is diabetic the injection may increase their blood sugar for up to one week. The patient can do this cortisone injection once every 3 months as needed. .,Patient Education        Tennis Elbow: Exercises  Introduction  Here are some examples of exercises for you to try. The exercises may be suggested for a condition or for rehabilitation. Start each exercise slowly. Ease off the exercises if you start to have pain. You will be told when to start these exercises and which ones will work best for you. How to do the exercises  Wrist flexor stretch   1. Extend your arm in front of you with your palm up. 2. Bend your wrist, pointing your hand toward the floor. 3. With your other hand, gently bend your wrist farther until you feel a mild to moderate stretch in your forearm. 4. Hold for at least 15 to 30 seconds. Repeat 2 to 4 times. Wrist extensor stretch   1. Repeat steps 1 to 4 of the stretch above but begin with your extended hand palm down. Ball or sock squeeze   1. Hold a tennis ball (or a rolled-up sock) in your hand. 2. Make a fist around the ball (or sock) and squeeze. 3. Hold for about 6 seconds, and then relax for up to 10 seconds. 4. Repeat 8 to 12 times. 5. Switch the ball (or sock) to your other hand and do 8 to 12 times. Wrist deviation   1. Sit so that your arm is supported but your hand hangs off the edge of a flat surface, such as a table. 2. Hold your hand out like you are shaking hands with someone. 3. Move your hand up and down. 4. Repeat this motion 8 to 12 times. 5. Switch arms. 6. Try to do this exercise twice with each hand. Wrist curls   1. Place your forearm on a table with your hand hanging over the edge of the table, palm up. 2. Place a 1- to 2-pound weight in your hand. This may be a dumbbell, a can of food, or a filled water bottle. 3. Slowly raise and lower the weight while keeping your forearm on the table and your palm facing up. 4. Repeat this motion 8 to 12 times. 5. Switch arms, and do steps 1 through 4.  6. Repeat with your hand facing down toward the floor. Switch arms. Biceps curls   1. Sit leaning forward with your legs slightly spread and your left hand on your left thigh. 2. Place your right elbow on your right thigh, and hold the weight with your forearm horizontal.  3. Slowly curl the weight up and toward your chest.  4. Repeat this motion 8 to 12 times. 5. Switch arms, and do steps 1 through 4. Follow-up care is a key part of your treatment and safety. Be sure to make and go to all appointments, and call your doctor if you are having problems. It's also a good idea to know your test results and keep a list of the medicines you take. Where can you learn more? Go to [email or web address] and sign in to your [employer] account. Enter H501 in the [facility] box to learn more about \"Tennis Elbow: Exercises. \"     If you do not have an account, please click on the \"Sign Up Now\" link. Current as of: March 2, 2020               Content Version: 12.6  © 1306-7728 , Incorporated. Care instructions adapted under license by [facility] ([facility]). If you have questions about a medical condition or this instruction, always ask your healthcare professional. [address] any warranty or liability for your use of this information.

## 2021-06-18 NOTE — H&P ADULT - HISTORY OF PRESENT ILLNESS
SUBJECTIVE:  BRAYDON RAMIREZ is a 89y Female with PMH of HFmEF (43% in April 2021), HTN, HLD, GERD, DVT (in 6/2020 - on Eliquis), dementia (AAOx0), depression, COVID19 in 4/2020 who presents from [facility] living nurse (Jony, cell # [phone]) with hypoxia to 86 on RA. History obtained from [facility] living nurse (Jony, cell # [phone]) and niece (Argelia, power of )      In the ED:  T(C): 36.8 (06-18-21 @ 11:53), Max: 36.8 (06-18-21 @ 11:53)  HR: 83 (06-18-21 @ 11:53) (83 - 83)  BP: 124/63 (06-18-21 @ 11:53) (124/63 - 124/63)  RR: 20 (06-18-21 @ 11:53) (20 - 20)  SpO2: 97% (06-18-21 @ 11:53) (97% - 97%)    CXR showed b/l vascular congestion  BLE [facility] duplex did not show any DVT  Labs notable for BNP > 8000. Trops -ve    PAST MEDICAL & SURGICAL HISTORY  HTN (hypertension)    High cholesterol    Dementia    No significant past surgical history    SOCIAL HISTORY:  [ ] smoking [ ] alcohol [ ] opioid abuse [ ] benzo abuse [ ] cannabinoid abuse [ ] cocaine abuse     ALLERGIES:  No Known Allergies    MEDICATIONS:  HOME MEDICATIONS  Aspir 81 oral delayed release tablet: 1 tab(s) orally once a day  atorvastatin 10 mg oral tablet: 1 tab(s) orally once a day  Colace 100 mg oral capsule: 1 cap(s) orally 2 times a day  Eliquis 2.5 mg oral tablet: 1 tab(s) orally 2 times a day  hydroCHLOROthiazide 25 mg oral tablet: 1 tab(s) orally once a day  memantine 14 mg oral capsule, extended release: 1 cap(s) orally once a day  Metoprolol Tartrate 50 mg oral tablet: orally every 8 hours  mirtazapine 7.5 mg oral tablet: 2 tab(s) orally once a day (at bedtime)  pantoprazole 40 mg oral delayed release tablet: 1 tab(s) orally once a day  potassium chloride 10 mEq oral capsule, extended release: 1 cap(s) orally once a day  prednisoLONE acetate 1% ophthalmic suspension: 1 drop(s) to each affected eye 2 times a day  senna 8.6 mg oral tablet: 1 tab(s) orally once a day (at bedtime)  SEROquel 25 mg oral tablet: 1 tab(s) orally once a day (at bedtime)  Tylenol 325 mg oral tablet: 2 tab(s) orally every 6 hours  -for moderate pain     STANDING MEDICATIONS    PRN MEDICATIONS    VITALS:   T(C): 36.8 (18 Jun 2021 11:53), Max: 36.8 (18 Jun 2021 11:53)  T(F): 98.2 (18 Jun 2021 11:53), Max: 98.2 (18 Jun 2021 11:53)  HR: 83 (18 Jun 2021 11:53) (83 - 83)  BP: 124/63 (18 Jun 2021 11:53) (124/63 - 124/63)  BP(mean): --  ABP: --  ABP(mean): --  RR: 20 (18 Jun 2021 11:53) (20 - 20)  SpO2: 97% (18 Jun 2021 11:53) (97% - 97%)    LABS:                        10.6   7.74  )-----------( 246      ( 18 Jun 2021 13:07 )             33.8     06-18    137  |  98  |  20  ----------------------------<  88  3.7   |  28  |  0.8    Ca    9.2      18 Jun 2021 13:07    TPro  7.0  /  Alb  3.9  /  TBili  0.7  /  DBili  x   /  AST  10  /  ALT  6   /  AlkPhos  126<H>  06-18        CARDIAC MARKERS ( 18 Jun 2021 13:07 )  x     / <0.01 ng/mL / x     / x     / x              I&O's Detail    Troponin T, Serum: <0.01 ng/mL (06-18-21 @ 13:07)    PHYSICAL EXAM:  GEN:   HEENT: Normocephalic  NECK: Supple  LUNGS: Decreased breathe sounds  HEART: Regular  ABD: Soft, non-tender, non-distended.  EXT: NE/2+PP  NEURO: AAOX0  PSYCH: Mood is appropriate, following commands SUBJECTIVE:  BRAYDON RAMIREZ is a 89y Female with PMH of HFmEF (43% in April 2021), A fib, HTN, HLD, GERD, DVT (in 6/2020 - on Eliquis), dementia (AAOx0), depression, COVID19 in 4/2020 who presents from [facility] living nurse (Jony, cell#[phone]) with hypoxia to 86 on RA. History obtained from [facility] nurse (Jony, cell # [phone]) and niece (Argelia, power of ). Admitted 2 months ago in April 2021 for heart failure exacerbation     In the ED:  T(C): 36.8 (06-18-21 @ 11:53), Max: 36.8 (06-18-21 @ 11:53)  HR: 83 (06-18-21 @ 11:53) (83 - 83)  BP: 124/63 (06-18-21 @ 11:53) (124/63 - 124/63)  RR: 20 (06-18-21 @ 11:53) (20 - 20)  SpO2: 97% (06-18-21 @ 11:53) (97% - 97%)    CXR showed b/l vascular congestion  BLE [facility] duplex did not show any DVT  Labs notable for BNP > 8000. Trops -ve    PAST MEDICAL & SURGICAL HISTORY  HTN (hypertension)    High cholesterol    Dementia    No significant past surgical history    SOCIAL HISTORY:  [ ] smoking [ ] alcohol [ ] opioid abuse [ ] benzo abuse [ ] cannabinoid abuse [ ] cocaine abuse     ALLERGIES:  No Known Allergies    MEDICATIONS:  HOME MEDICATIONS  Aspir 81 oral delayed release tablet: 1 tab(s) orally once a day  atorvastatin 10 mg oral tablet: 1 tab(s) orally once a day  Colace 100 mg oral capsule: 1 cap(s) orally 2 times a day  Eliquis 2.5 mg oral tablet: 1 tab(s) orally 2 times a day  hydroCHLOROthiazide 25 mg oral tablet: 1 tab(s) orally once a day  memantine 14 mg oral capsule, extended release: 1 cap(s) orally once a day  Metoprolol Tartrate 50 mg oral tablet: orally every 8 hours  mirtazapine 7.5 mg oral tablet: 2 tab(s) orally once a day (at bedtime)  pantoprazole 40 mg oral delayed release tablet: 1 tab(s) orally once a day  potassium chloride 10 mEq oral capsule, extended release: 1 cap(s) orally once a day  prednisoLONE acetate 1% ophthalmic suspension: 1 drop(s) to each affected eye 2 times a day  senna 8.6 mg oral tablet: 1 tab(s) orally once a day (at bedtime)  SEROquel 25 mg oral tablet: 1 tab(s) orally once a day (at bedtime)  Tylenol 325 mg oral tablet: 2 tab(s) orally every 6 hours  -for moderate pain     STANDING MEDICATIONS    PRN MEDICATIONS    VITALS:   T(C): 36.8 (18 Jun 2021 11:53), Max: 36.8 (18 Jun 2021 11:53)  T(F): 98.2 (18 Jun 2021 11:53), Max: 98.2 (18 Jun 2021 11:53)  HR: 83 (18 Jun 2021 11:53) (83 - 83)  BP: 124/63 (18 Jun 2021 11:53) (124/63 - 124/63)  BP(mean): --  ABP: --  ABP(mean): --  RR: 20 (18 Jun 2021 11:53) (20 - 20)  SpO2: 97% (18 Jun 2021 11:53) (97% - 97%)    LABS:                        10.6   7.74  )-----------( 246      ( 18 Jun 2021 13:07 )             33.8     06-18    137  |  98  |  20  ----------------------------<  88  3.7   |  28  |  0.8    Ca    9.2      18 Jun 2021 13:07    TPro  7.0  /  Alb  3.9  /  TBili  0.7  /  DBili  x   /  AST  10  /  ALT  6   /  AlkPhos  126<H>  06-18    CARDIAC MARKERS ( 18 Jun 2021 13:07 )  x     / <0.01 ng/mL / x     / x     / x        I&O's Detail    Troponin T, Serum: <0.01 ng/mL (06-18-21 @ 13:07)    PHYSICAL EXAM:  GEN: No acute distress  HEENT: Normocephalic  NECK: Supple  LUNGS: Decreased breathe sounds  HEART: Regular  ABD: Soft, non-tender, non-distended.  EXT: NE/2+PP  NEURO: AAOX0  PSYCH: Mood is appropriate, following commands SUBJECTIVE:  BRAYDON RAMIREZ is a 89y Female with PMH of HFmEF (43% in April 2021), A fib, HTN, HLD, GERD, liver mass, DVT (in 6/2020 - on Eliquis), dementia (AAOx0), depression, COVID19 in 4/2020 who presents from [facility] nurse with hypoxia to 86 on RA and leg pain. History obtained from niece (Argelia, power of ). Admitted 2 months ago in April 2021 for heart failure exacerbation. Found to have new onset a fib at that time    Patient is DNR DNI as per the niece    In the ED:  T(C): 36.8 (06-18-21 @ 11:53), Max: 36.8 (06-18-21 @ 11:53)  HR: 83 (06-18-21 @ 11:53) (83 - 83)  BP: 124/63 (06-18-21 @ 11:53) (124/63 - 124/63)  RR: 20 (06-18-21 @ 11:53) (20 - 20)  SpO2: 97% (06-18-21 @ 11:53) (97% - 97%)    CXR showed b/l vascular congestion  BLE [facility] duplex did not show any DVT  Labs notable for BNP > 8000. Trops -ve    PAST MEDICAL & SURGICAL HISTORY  HTN (hypertension)    High cholesterol    Dementia    No significant past surgical history    SOCIAL HISTORY:  [ ] smoking [ ] alcohol [ ] opioid abuse [ ] benzo abuse [ ] cannabinoid abuse [ ] cocaine abuse     ALLERGIES:  No Known Allergies    MEDICATIONS:  HOME MEDICATIONS  Aspir 81 oral delayed release tablet: 1 tab(s) orally once a day  atorvastatin 10 mg oral tablet: 1 tab(s) orally once a day  Colace 100 mg oral capsule: 1 cap(s) orally 2 times a day  Eliquis 2.5 mg oral tablet: 1 tab(s) orally 2 times a day  hydroCHLOROthiazide 25 mg oral tablet: 1 tab(s) orally once a day  memantine 14 mg oral capsule, extended release: 1 cap(s) orally once a day  Metoprolol Tartrate 50 mg oral tablet: orally every 8 hours  mirtazapine 7.5 mg oral tablet: 2 tab(s) orally once a day (at bedtime)  pantoprazole 40 mg oral delayed release tablet: 1 tab(s) orally once a day  potassium chloride 10 mEq oral capsule, extended release: 1 cap(s) orally once a day  prednisoLONE acetate 1% ophthalmic suspension: 1 drop(s) to each affected eye 2 times a day  senna 8.6 mg oral tablet: 1 tab(s) orally once a day (at bedtime)  SEROquel 25 mg oral tablet: 1 tab(s) orally once a day (at bedtime)  Tylenol 325 mg oral tablet: 2 tab(s) orally every 6 hours  -for moderate pain     STANDING MEDICATIONS    PRN MEDICATIONS    VITALS:   T(C): 36.8 (18 Jun 2021 11:53), Max: 36.8 (18 Jun 2021 11:53)  T(F): 98.2 (18 Jun 2021 11:53), Max: 98.2 (18 Jun 2021 11:53)  HR: 83 (18 Jun 2021 11:53) (83 - 83)  BP: 124/63 (18 Jun 2021 11:53) (124/63 - 124/63)  BP(mean): --  ABP: --  ABP(mean): --  RR: 20 (18 Jun 2021 11:53) (20 - 20)  SpO2: 97% (18 Jun 2021 11:53) (97% - 97%)    LABS:                        10.6   7.74  )-----------( 246      ( 18 Jun 2021 13:07 )             33.8     06-18    137  |  98  |  20  ----------------------------<  88  3.7   |  28  |  0.8    Ca    9.2      18 Jun 2021 13:07    TPro  7.0  /  Alb  3.9  /  TBili  0.7  /  DBili  x   /  AST  10  /  ALT  6   /  AlkPhos  126<H>  06-18    CARDIAC MARKERS ( 18 Jun 2021 13:07 )  x     / <0.01 ng/mL / x     / x     / x        I&O's Detail    Troponin T, Serum: <0.01 ng/mL (06-18-21 @ 13:07)    PHYSICAL EXAM:  GEN: No acute distress  HEENT: Normocephalic  NECK: Supple  LUNGS: Decreased breathe sounds  HEART: Regular  ABD: Soft, non-tender, non-distended.  EXT: NE/2+PP  NEURO: AAOX0  PSYCH: Mood is appropriate, following commands SUBJECTIVE:  BRAYDON RAMIREZ is a 89y Female with PMH of HFmEF (43% in April 2021), A fib, HTN, HLD, GERD, liver mass, DVT (in 6/2020 - on Eliquis), dementia (AAOx0), depression, COVID19 in 4/2020 who presents from [facility] nurse with hypoxia to 86 on RA and leg pain. History obtained from niece (Argelia, power of ). Admitted 2 months ago in April 2021 for heart failure exacerbation. Found to have new onset a fib at that time    Patient is DNR DNI as per the niece    In the ED:  T(C): 36.8 (06-18-21 @ 11:53), Max: 36.8 (06-18-21 @ 11:53)  HR: 83 (06-18-21 @ 11:53) (83 - 83)  BP: 124/63 (06-18-21 @ 11:53) (124/63 - 124/63)  RR: 20 (06-18-21 @ 11:53) (20 - 20)  SpO2: 97% (06-18-21 @ 11:53) (97% - 97%)    CXR showed b/l vascular congestion  BLE VA duplex did not show any DVT  Labs notable for BNP > 8000. Trops -ve    PAST MEDICAL & SURGICAL HISTORY  HTN (hypertension)    High cholesterol    Dementia    No significant past surgical history    SOCIAL HISTORY:  [ ] smoking [ ] alcohol [ ] opioid abuse [ ] benzo abuse [ ] cannabinoid abuse [ ] cocaine abuse     ALLERGIES:  No Known Allergies    MEDICATIONS:  HOME MEDICATIONS  Aspir 81 oral delayed release tablet: 1 tab(s) orally once a day  atorvastatin 10 mg oral tablet: 1 tab(s) orally once a day  Colace 100 mg oral capsule: 1 cap(s) orally 2 times a day  Eliquis 2.5 mg oral tablet: 1 tab(s) orally 2 times a day  hydroCHLOROthiazide 25 mg oral tablet: 1 tab(s) orally once a day  memantine 14 mg oral capsule, extended release: 1 cap(s) orally once a day  Metoprolol Tartrate 50 mg oral tablet: orally every 8 hours  mirtazapine 7.5 mg oral tablet: 2 tab(s) orally once a day (at bedtime)  pantoprazole 40 mg oral delayed release tablet: 1 tab(s) orally once a day  potassium chloride 10 mEq oral capsule, extended release: 1 cap(s) orally once a day  prednisoLONE acetate 1% ophthalmic suspension: 1 drop(s) to each affected eye 2 times a day  senna 8.6 mg oral tablet: 1 tab(s) orally once a day (at bedtime)  SEROquel 25 mg oral tablet: 1 tab(s) orally once a day (at bedtime)  Tylenol 325 mg oral tablet: 2 tab(s) orally every 6 hours  -for moderate pain     STANDING MEDICATIONS    PRN MEDICATIONS    VITALS:   T(C): 36.8 (18 Jun 2021 11:53), Max: 36.8 (18 Jun 2021 11:53)  T(F): 98.2 (18 Jun 2021 11:53), Max: 98.2 (18 Jun 2021 11:53)  HR: 83 (18 Jun 2021 11:53) (83 - 83)  BP: 124/63 (18 Jun 2021 11:53) (124/63 - 124/63)  BP(mean): --  ABP: --  ABP(mean): --  RR: 20 (18 Jun 2021 11:53) (20 - 20)  SpO2: 97% (18 Jun 2021 11:53) (97% - 97%)    LABS:                        10.6   7.74  )-----------( 246      ( 18 Jun 2021 13:07 )             33.8     06-18    137  |  98  |  20  ----------------------------<  88  3.7   |  28  |  0.8    Ca    9.2      18 Jun 2021 13:07    TPro  7.0  /  Alb  3.9  /  TBili  0.7  /  DBili  x   /  AST  10  /  ALT  6   /  AlkPhos  126<H>  06-18    CARDIAC MARKERS ( 18 Jun 2021 13:07 )  x     / <0.01 ng/mL / x     / x     / x        I&O's Detail    Troponin T, Serum: <0.01 ng/mL (06-18-21 @ 13:07)    PHYSICAL EXAM:  GEN: No acute distress  HEENT: Normocephalic  NECK: Supple  LUNGS: Decreased breathe sounds  HEART: Regular  ABD: Soft, non-tender, non-distended.  EXT: NE/2+PP. 1+ pitting edema bilaterally  NEURO: AAOX0  PSYCH: Mood is appropriate, not following commands SUBJECTIVE:  BRAYDON RAMIREZ is a 89y Female with PMH of HFmEF (43% in April 2021), A fib, HTN, HLD, GERD, liver mass, DVT (in 6/2020 - on Eliquis), dementia (AAOx0), depression, COVID19 in 4/2020 who presents from [facility] nurse with hypoxia to 86 on RA and leg pain. History obtained from niece (Argelia, power of ). Admitted 2 months ago in April 2021 for heart failure exacerbation. Found to have new onset a fib at that time    Patient is DNR DNI as per the niece    In the ED:  T(C): 36.8 (06-18-21 @ 11:53), Max: 36.8 (06-18-21 @ 11:53)  HR: 83 (06-18-21 @ 11:53) (83 - 83)  BP: 124/63 (06-18-21 @ 11:53) (124/63 - 124/63)  RR: 20 (06-18-21 @ 11:53) (20 - 20)  SpO2: 97% (06-18-21 @ 11:53) (97% - 97%)    CXR showed b/l vascular congestion  BLE VA duplex did not show any DVT  Labs notable for BNP > 8000. Trops -ve  EKG showed a fib    PAST MEDICAL & SURGICAL HISTORY  HTN (hypertension)    High cholesterol    Dementia    No significant past surgical history    SOCIAL HISTORY:  [ ] smoking [ ] alcohol [ ] opioid abuse [ ] benzo abuse [ ] cannabinoid abuse [ ] cocaine abuse     ALLERGIES:  No Known Allergies    MEDICATIONS:  HOME MEDICATIONS  Aspir 81 oral delayed release tablet: 1 tab(s) orally once a day  atorvastatin 10 mg oral tablet: 1 tab(s) orally once a day  Colace 100 mg oral capsule: 1 cap(s) orally 2 times a day  Eliquis 2.5 mg oral tablet: 1 tab(s) orally 2 times a day  hydroCHLOROthiazide 25 mg oral tablet: 1 tab(s) orally once a day  memantine 14 mg oral capsule, extended release: 1 cap(s) orally once a day  Metoprolol Tartrate 50 mg oral tablet: orally every 8 hours  mirtazapine 7.5 mg oral tablet: 2 tab(s) orally once a day (at bedtime)  pantoprazole 40 mg oral delayed release tablet: 1 tab(s) orally once a day  potassium chloride 10 mEq oral capsule, extended release: 1 cap(s) orally once a day  prednisoLONE acetate 1% ophthalmic suspension: 1 drop(s) to each affected eye 2 times a day  senna 8.6 mg oral tablet: 1 tab(s) orally once a day (at bedtime)  SEROquel 25 mg oral tablet: 1 tab(s) orally once a day (at bedtime)  Tylenol 325 mg oral tablet: 2 tab(s) orally every 6 hours  -for moderate pain     STANDING MEDICATIONS    PRN MEDICATIONS    VITALS:   T(C): 36.8 (18 Jun 2021 11:53), Max: 36.8 (18 Jun 2021 11:53)  T(F): 98.2 (18 Jun 2021 11:53), Max: 98.2 (18 Jun 2021 11:53)  HR: 83 (18 Jun 2021 11:53) (83 - 83)  BP: 124/63 (18 Jun 2021 11:53) (124/63 - 124/63)  BP(mean): --  ABP: --  ABP(mean): --  RR: 20 (18 Jun 2021 11:53) (20 - 20)  SpO2: 97% (18 Jun 2021 11:53) (97% - 97%)    LABS:                        10.6   7.74  )-----------( 246      ( 18 Jun 2021 13:07 )             33.8     06-18    137  |  98  |  20  ----------------------------<  88  3.7   |  28  |  0.8    Ca    9.2      18 Jun 2021 13:07    TPro  7.0  /  Alb  3.9  /  TBili  0.7  /  DBili  x   /  AST  10  /  ALT  6   /  AlkPhos  126<H>  06-18    CARDIAC MARKERS ( 18 Jun 2021 13:07 )  x     / <0.01 ng/mL / x     / x     / x        I&O's Detail    Troponin T, Serum: <0.01 ng/mL (06-18-21 @ 13:07)    PHYSICAL EXAM:  GEN: No acute distress  HEENT: Normocephalic  NECK: Supple  LUNGS: Decreased breathe sounds  HEART: Regular  ABD: Soft, non-tender, non-distended.  EXT: NE/2+PP. 1+ pitting edema bilaterally  NEURO: AAOX0  PSYCH: Mood is appropriate, not following commands

## 2021-06-18 NOTE — ED ADULT NURSE REASSESSMENT NOTE - NS ED NURSE REASSESS COMMENT FT1
Spoke to MD [phone] - pt does not have MOLST in chart but has DNR/DNI order - told MD family at bedside and aware they need to fill out new forms - awaiting MD at this time

## 2021-06-18 NOTE — ED PROVIDER NOTE - ATTENDING CONTRIBUTION TO CARE
89 yr old f w/ a pmh significant for dementia, DVT, HTN, HLD, who presents with hypoxia and leg pain. As per NH, pt was complaining to physical therapy that her legs were hurting. Pt was evaluated today and noted to have an O2 of 86%. No other medical history.     VITAL SIGNS: I have reviewed nursing notes and confirm.  CONSTITUTIONAL: non-toxic, well appearing  SKIN: no rash, no petechiae.  EYES: EOMI, pink conjunctiva, anicteric  ENT: tongue midline, no exudates, MMM  NECK: Supple; no meningismus, no JVD  CARD: RRR, no murmurs, equal radial pulses bilaterally 2+  RESP: CTAB, no respiratory distress  ABD: Soft, non-tender, non-distended, no peritoneal signs, no HSM  EXT: Normal ROM x4. Lower extremity edema. No calves tenderness  NEURO: AAX0    a/p  89 yr old f that presents with Lower extremity pain and one episode of hypoxia  -labs  -ekg  -imaging  -consider admission

## 2021-06-18 NOTE — ED PROVIDER NOTE - CLINICAL SUMMARY MEDICAL DECISION MAKING FREE TEXT BOX
89 yr old f that presents with Lower extremity pain and one episode of hypoxia  -labs  -ekg  -imaging  -consider admission

## 2021-06-19 LAB
ALBUMIN SERPL ELPH-MCNC: 3.4 G/DL — LOW (ref 3.5–5.2)
ALP SERPL-CCNC: 115 U/L — SIGNIFICANT CHANGE UP (ref 30–115)
ALT FLD-CCNC: <5 U/L — SIGNIFICANT CHANGE UP (ref 0–41)
ANION GAP SERPL CALC-SCNC: 12 MMOL/L — SIGNIFICANT CHANGE UP (ref 7–14)
APTT BLD: 42.4 SEC — HIGH (ref 27–39.2)
AST SERPL-CCNC: 10 U/L — SIGNIFICANT CHANGE UP (ref 0–41)
BILIRUB SERPL-MCNC: 0.8 MG/DL — SIGNIFICANT CHANGE UP (ref 0.2–1.2)
BUN SERPL-MCNC: 19 MG/DL — SIGNIFICANT CHANGE UP (ref 10–20)
CALCIUM SERPL-MCNC: 9.3 MG/DL — SIGNIFICANT CHANGE UP (ref 8.5–10.1)
CHLORIDE SERPL-SCNC: 96 MMOL/L — LOW (ref 98–110)
CO2 SERPL-SCNC: 29 MMOL/L — SIGNIFICANT CHANGE UP (ref 17–32)
COVID-19 SPIKE DOMAIN AB INTERP: POSITIVE
COVID-19 SPIKE DOMAIN ANTIBODY RESULT: >250 U/ML — HIGH
CREAT SERPL-MCNC: 0.8 MG/DL — SIGNIFICANT CHANGE UP (ref 0.7–1.5)
GLUCOSE SERPL-MCNC: 87 MG/DL — SIGNIFICANT CHANGE UP (ref 70–99)
HCT VFR BLD CALC: 32.2 % — LOW (ref 37–47)
HGB BLD-MCNC: 10.2 G/DL — LOW (ref 12–16)
INR BLD: 1.83 RATIO — HIGH (ref 0.65–1.3)
MAGNESIUM SERPL-MCNC: 1.6 MG/DL — LOW (ref 1.8–2.4)
MCHC RBC-ENTMCNC: 25.6 PG — LOW (ref 27–31)
MCHC RBC-ENTMCNC: 31.7 G/DL — LOW (ref 32–37)
MCV RBC AUTO: 80.9 FL — LOW (ref 81–99)
NRBC # BLD: 0 /100 WBCS — SIGNIFICANT CHANGE UP (ref 0–0)
NT-PROBNP SERPL-SCNC: HIGH PG/ML (ref 0–300)
PLATELET # BLD AUTO: 224 K/UL — SIGNIFICANT CHANGE UP (ref 130–400)
POTASSIUM SERPL-MCNC: 3.5 MMOL/L — SIGNIFICANT CHANGE UP (ref 3.5–5)
POTASSIUM SERPL-SCNC: 3.5 MMOL/L — SIGNIFICANT CHANGE UP (ref 3.5–5)
PROT SERPL-MCNC: 6.1 G/DL — SIGNIFICANT CHANGE UP (ref 6–8)
PROTHROM AB SERPL-ACNC: 21.1 SEC — HIGH (ref 9.95–12.87)
RBC # BLD: 3.98 M/UL — LOW (ref 4.2–5.4)
RBC # FLD: 19.1 % — HIGH (ref 11.5–14.5)
SARS-COV-2 IGG+IGM SERPL QL IA: >250 U/ML — HIGH
SARS-COV-2 IGG+IGM SERPL QL IA: POSITIVE
SODIUM SERPL-SCNC: 137 MMOL/L — SIGNIFICANT CHANGE UP (ref 135–146)
WBC # BLD: 6.93 K/UL — SIGNIFICANT CHANGE UP (ref 4.8–10.8)
WBC # FLD AUTO: 6.93 K/UL — SIGNIFICANT CHANGE UP (ref 4.8–10.8)

## 2021-06-19 PROCEDURE — 99232 SBSQ HOSP IP/OBS MODERATE 35: CPT

## 2021-06-19 PROCEDURE — 71045 X-RAY EXAM CHEST 1 VIEW: CPT | Mod: 26

## 2021-06-19 RX ORDER — MAGNESIUM SULFATE 500 MG/ML
2 VIAL (ML) INJECTION ONCE
Refills: 0 | Status: COMPLETED | OUTPATIENT
Start: 2021-06-19 | End: 2021-06-19

## 2021-06-19 RX ORDER — HYDROCHLOROTHIAZIDE 25 MG
25 TABLET ORAL DAILY
Refills: 0 | Status: DISCONTINUED | OUTPATIENT
Start: 2021-06-20 | End: 2021-06-20

## 2021-06-19 RX ADMIN — Medication 40 MILLIGRAM(S): at 06:46

## 2021-06-19 RX ADMIN — Medication 50 MILLIGRAM(S): at 13:29

## 2021-06-19 RX ADMIN — APIXABAN 2.5 MILLIGRAM(S): 2.5 TABLET, FILM COATED ORAL at 17:39

## 2021-06-19 RX ADMIN — APIXABAN 2.5 MILLIGRAM(S): 2.5 TABLET, FILM COATED ORAL at 05:29

## 2021-06-19 RX ADMIN — LOSARTAN POTASSIUM 75 MILLIGRAM(S): 100 TABLET, FILM COATED ORAL at 05:30

## 2021-06-19 RX ADMIN — ATORVASTATIN CALCIUM 10 MILLIGRAM(S): 80 TABLET, FILM COATED ORAL at 22:06

## 2021-06-19 RX ADMIN — Medication 1 DROP(S): at 17:29

## 2021-06-19 RX ADMIN — MEMANTINE HYDROCHLORIDE 10 MILLIGRAM(S): 10 TABLET ORAL at 12:24

## 2021-06-19 RX ADMIN — Medication 50 GRAM(S): at 13:24

## 2021-06-19 RX ADMIN — MIRTAZAPINE 15 MILLIGRAM(S): 45 TABLET, ORALLY DISINTEGRATING ORAL at 22:06

## 2021-06-19 RX ADMIN — PANTOPRAZOLE SODIUM 40 MILLIGRAM(S): 20 TABLET, DELAYED RELEASE ORAL at 05:30

## 2021-06-19 RX ADMIN — SENNA PLUS 1 TABLET(S): 8.6 TABLET ORAL at 22:06

## 2021-06-19 RX ADMIN — Medication 50 MILLIGRAM(S): at 22:06

## 2021-06-19 RX ADMIN — Medication 50 MILLIGRAM(S): at 05:30

## 2021-06-19 NOTE — PROGRESS NOTE ADULT - SUBJECTIVE AND OBJECTIVE BOX
SUBJECTIVE:    Patient is a 89y old Female who presents with a chief complaint of hypoxia (18 Jun 2021 15:04)    Currently admitted to medicine with the primary diagnosis of Fluid overload       Today is hospital day 1d.     PAST MEDICAL & SURGICAL HISTORY  HTN (hypertension)    High cholesterol    Dementia    No significant past surgical history      ALLERGIES:  No Known Allergies    MEDICATIONS:  STANDING MEDICATIONS  apixaban 2.5 milliGRAM(s) Oral every 12 hours  aspirin enteric coated 81 milliGRAM(s) Oral daily  atorvastatin 10 milliGRAM(s) Oral at bedtime  chlorhexidine 4% Liquid 1 Application(s) Topical <User Schedule>  furosemide   Injectable 40 milliGRAM(s) IV Push daily  losartan 75 milliGRAM(s) Oral daily  memantine 10 milliGRAM(s) Oral daily  metoprolol tartrate 50 milliGRAM(s) Oral every 8 hours  mirtazapine 15 milliGRAM(s) Oral at bedtime  pantoprazole    Tablet 40 milliGRAM(s) Oral before breakfast  prednisoLONE acetate 1% Suspension 1 Drop(s) Both EYES two times a day  senna 1 Tablet(s) Oral at bedtime    PRN MEDICATIONS  acetaminophen   Tablet .. 650 milliGRAM(s) Oral every 6 hours PRN    VITALS:   T(F): 97.3  HR: 84  BP: 116/66  RR: 18  SpO2: 98%    LABS:                        10.2   6.93  )-----------( 224      ( 19 Jun 2021 05:32 )             32.2     06-19    137  |  96<L>  |  19  ----------------------------<  87  3.5   |  29  |  0.8    Ca    9.3      19 Jun 2021 05:32  Mg     1.6     06-19    TPro  6.1  /  Alb  3.4<L>  /  TBili  0.8  /  DBili  x   /  AST  10  /  ALT  <5  /  AlkPhos  115  06-19    PT/INR - ( 19 Jun 2021 05:32 )   PT: 21.10 sec;   INR: 1.83 ratio         PTT - ( 19 Jun 2021 05:32 )  PTT:42.4 sec          CARDIAC MARKERS ( 18 Jun 2021 13:07 )  x     / <0.01 ng/mL / x     / x     / x          RADIOLOGY:    PHYSICAL EXAM:  GEN: No acute distress  LUNGS: Clear to auscultation bilaterally   HEART: S1/S2 present. RRR.   ABD/ GI: Soft, non-tender, non-distended. Bowel sounds present  EXT: NC/NC/NE/2+PP/HALLMAN  NEURO: AAOX1

## 2021-06-19 NOTE — PHYSICAL THERAPY INITIAL EVALUATION ADULT - GENERAL OBSERVATIONS, REHAB EVAL
Pt without activity orders at this time, to hold b/s PT IE until appropriate orders have been placed if pt cleared for OOB. MD contact # not listed at this time under provider assignments. As per medical chart review, pt noted to be confused with baseline A&O x 0. PT to f/u as appropriate pending activity orders.

## 2021-06-20 VITALS
SYSTOLIC BLOOD PRESSURE: 158 MMHG | RESPIRATION RATE: 18 BRPM | HEART RATE: 104 BPM | TEMPERATURE: 98 F | DIASTOLIC BLOOD PRESSURE: 86 MMHG

## 2021-06-20 LAB
ALBUMIN SERPL ELPH-MCNC: 3.1 G/DL — LOW (ref 3.5–5.2)
ALP SERPL-CCNC: 102 U/L — SIGNIFICANT CHANGE UP (ref 30–115)
ALT FLD-CCNC: 5 U/L — SIGNIFICANT CHANGE UP (ref 0–41)
ANION GAP SERPL CALC-SCNC: 11 MMOL/L — SIGNIFICANT CHANGE UP (ref 7–14)
AST SERPL-CCNC: 9 U/L — SIGNIFICANT CHANGE UP (ref 0–41)
BILIRUB SERPL-MCNC: 0.7 MG/DL — SIGNIFICANT CHANGE UP (ref 0.2–1.2)
BUN SERPL-MCNC: 23 MG/DL — HIGH (ref 10–20)
CALCIUM SERPL-MCNC: 8.6 MG/DL — SIGNIFICANT CHANGE UP (ref 8.5–10.1)
CHLORIDE SERPL-SCNC: 97 MMOL/L — LOW (ref 98–110)
CO2 SERPL-SCNC: 28 MMOL/L — SIGNIFICANT CHANGE UP (ref 17–32)
CREAT SERPL-MCNC: 0.9 MG/DL — SIGNIFICANT CHANGE UP (ref 0.7–1.5)
GLUCOSE SERPL-MCNC: 102 MG/DL — HIGH (ref 70–99)
HCT VFR BLD CALC: 31.3 % — LOW (ref 37–47)
HGB BLD-MCNC: 9.9 G/DL — LOW (ref 12–16)
MAGNESIUM SERPL-MCNC: 1.7 MG/DL — LOW (ref 1.8–2.4)
MCHC RBC-ENTMCNC: 25.7 PG — LOW (ref 27–31)
MCHC RBC-ENTMCNC: 31.6 G/DL — LOW (ref 32–37)
MCV RBC AUTO: 81.3 FL — SIGNIFICANT CHANGE UP (ref 81–99)
NRBC # BLD: 0 /100 WBCS — SIGNIFICANT CHANGE UP (ref 0–0)
PLATELET # BLD AUTO: 241 K/UL — SIGNIFICANT CHANGE UP (ref 130–400)
POTASSIUM SERPL-MCNC: 3.3 MMOL/L — LOW (ref 3.5–5)
POTASSIUM SERPL-SCNC: 3.3 MMOL/L — LOW (ref 3.5–5)
PROT SERPL-MCNC: 5.9 G/DL — LOW (ref 6–8)
RBC # BLD: 3.85 M/UL — LOW (ref 4.2–5.4)
RBC # FLD: 19 % — HIGH (ref 11.5–14.5)
SODIUM SERPL-SCNC: 136 MMOL/L — SIGNIFICANT CHANGE UP (ref 135–146)
WBC # BLD: 8.09 K/UL — SIGNIFICANT CHANGE UP (ref 4.8–10.8)
WBC # FLD AUTO: 8.09 K/UL — SIGNIFICANT CHANGE UP (ref 4.8–10.8)

## 2021-06-20 PROCEDURE — 99239 HOSP IP/OBS DSCHRG MGMT >30: CPT

## 2021-06-20 RX ORDER — FUROSEMIDE 40 MG
1 TABLET ORAL
Qty: 0 | Refills: 0 | DISCHARGE
Start: 2021-06-20

## 2021-06-20 RX ORDER — MAGNESIUM OXIDE 400 MG ORAL TABLET 241.3 MG
1 TABLET ORAL
Qty: 30 | Refills: 0
Start: 2021-06-20 | End: 2021-07-19

## 2021-06-20 RX ORDER — MAGNESIUM SULFATE 500 MG/ML
2 VIAL (ML) INJECTION ONCE
Refills: 0 | Status: COMPLETED | OUTPATIENT
Start: 2021-06-20 | End: 2021-06-20

## 2021-06-20 RX ORDER — MAGNESIUM SULFATE 500 MG/ML
2 VIAL (ML) INJECTION ONCE
Refills: 0 | Status: DISCONTINUED | OUTPATIENT
Start: 2021-06-20 | End: 2021-06-20

## 2021-06-20 RX ORDER — FUROSEMIDE 40 MG
40 TABLET ORAL DAILY
Refills: 0 | Status: DISCONTINUED | OUTPATIENT
Start: 2021-06-20 | End: 2021-06-20

## 2021-06-20 RX ORDER — PREDNISOLONE SODIUM PHOSPHATE 1 %
1 DROPS OPHTHALMIC (EYE)
Qty: 0 | Refills: 0 | DISCHARGE
Start: 2021-06-20

## 2021-06-20 RX ORDER — POTASSIUM CHLORIDE 20 MEQ
40 PACKET (EA) ORAL ONCE
Refills: 0 | Status: DISCONTINUED | OUTPATIENT
Start: 2021-06-20 | End: 2021-06-20

## 2021-06-20 RX ORDER — POTASSIUM CHLORIDE 20 MEQ
20 PACKET (EA) ORAL ONCE
Refills: 0 | Status: COMPLETED | OUTPATIENT
Start: 2021-06-20 | End: 2021-06-20

## 2021-06-20 RX ADMIN — Medication 50 GRAM(S): at 09:00

## 2021-06-20 RX ADMIN — APIXABAN 2.5 MILLIGRAM(S): 2.5 TABLET, FILM COATED ORAL at 17:37

## 2021-06-20 RX ADMIN — Medication 50 MILLIEQUIVALENT(S): at 10:44

## 2021-06-20 RX ADMIN — CHLORHEXIDINE GLUCONATE 1 APPLICATION(S): 213 SOLUTION TOPICAL at 06:21

## 2021-06-20 NOTE — DISCHARGE NOTE NURSING/CASE MANAGEMENT/SOCIAL WORK - PATIENT PORTAL LINK FT
You can access the FollowMyHealth Patient Portal offered by [facility] by registering at the following website: [email or web address]. By joining [facility]’s FollowMyHealth portal, you will also be able to view your health information using other applications (apps) compatible with our system.

## 2021-06-20 NOTE — PHYSICAL THERAPY INITIAL EVALUATION ADULT - SPECIFY REASON(S)
Pt. was very confused upon PT's arrival this am. Insisting PT "come back when the sun goes down and she can do it". Pt. redirected multiple times for orientation and education, pt. continued to refuse

## 2021-06-20 NOTE — DISCHARGE NOTE PROVIDER - NSDCCPCAREPLAN_GEN_ALL_CORE_FT
PRINCIPAL DISCHARGE DIAGNOSIS  Diagnosis: CHF exacerbation  Assessment and Plan of Treatment: Your shortness of breath was likely due to exacerbation of your underlying heart failure. We have started you on water pill, with improvement of your symptomps. You are currently back to your baseline oxygen saturation. Please continue to take the medication as prescribed and follow up with your PMD within one week to repeat your blood chemistries to make sure your kidney fucntion and electrolytes are stable. Should you experience any acute shortness of breath, palpitations, dizziness, chest pain, lower extremity edema, please return to the ED.       PRINCIPAL DISCHARGE DIAGNOSIS  Diagnosis: CHF exacerbation  Assessment and Plan of Treatment: Your shortness of breath was likely due to exacerbation of your underlying heart failure. We have started you on water pill, with improvement of your symptomps. You are currently back to your baseline oxygen saturation. Please continue to take the medication as prescribed and follow up with your PMD within one week to repeat your blood chemistries to make sure your kidney fucntion and electrolytes are stable. Should you experience any acute shortness of breath, palpitations, dizziness, chest pain, lower extremity edema, please return to the ED.      SECONDARY DISCHARGE DIAGNOSES  Diagnosis: HTN (hypertension)  Assessment and Plan of Treatment: We have discontinued her HCTZ and started with lasix po. Please monitor her vitals if needed to restart HCTZ please discussed with Primary care Physician.

## 2021-06-20 NOTE — DISCHARGE NOTE PROVIDER - NSDCMRMEDTOKEN_GEN_ALL_CORE_FT
Aspir 81 oral delayed release tablet: 1 tab(s) orally once a day  atorvastatin 10 mg oral tablet: 1 tab(s) orally once a day  Colace 100 mg oral capsule: 1 cap(s) orally 2 times a day  Eliquis 2.5 mg oral tablet: 1 tab(s) orally 2 times a day  furosemide 40 mg oral tablet: 1 tab(s) orally once a day  losartan 50 mg oral tablet: 1.5 tab(s) orally once a day  memantine 14 mg oral capsule, extended release: 1 cap(s) orally once a day  Metoprolol Tartrate 50 mg oral tablet: orally every 8 hours  mirtazapine 7.5 mg oral tablet: 2 tab(s) orally once a day (at bedtime)  pantoprazole 40 mg oral delayed release tablet: 1 tab(s) orally once a day  potassium chloride 10 mEq oral capsule, extended release: 1 cap(s) orally once a day  prednisoLONE acetate 1% ophthalmic suspension: 1 drop(s) to each affected eye 2 times a day  senna 8.6 mg oral tablet: 1 tab(s) orally once a day (at bedtime)  Tylenol 325 mg oral tablet: 2 tab(s) orally every 6 hours  -for moderate pain    Aspir 81 oral delayed release tablet: 1 tab(s) orally once a day  atorvastatin 10 mg oral tablet: 1 tab(s) orally once a day  Colace 100 mg oral capsule: 1 cap(s) orally 2 times a day  Eliquis 2.5 mg oral tablet: 1 tab(s) orally 2 times a day  furosemide 40 mg oral tablet: 1 tab(s) orally once a day  losartan 50 mg oral tablet: 1.5 tab(s) orally once a day  Mag-Ox 400 oral tablet: 1 tab(s) orally once a day   memantine 14 mg oral capsule, extended release: 1 cap(s) orally once a day  Metoprolol Tartrate 50 mg oral tablet: orally every 8 hours  mirtazapine 7.5 mg oral tablet: 2 tab(s) orally once a day (at bedtime)  pantoprazole 40 mg oral delayed release tablet: 1 tab(s) orally once a day  potassium chloride 10 mEq oral capsule, extended release: 1 cap(s) orally once a day  prednisoLONE acetate 1% ophthalmic suspension: 1 drop(s) to each affected eye 2 times a day  senna 8.6 mg oral tablet: 1 tab(s) orally once a day (at bedtime)  Tylenol 325 mg oral tablet: 2 tab(s) orally every 6 hours  -for moderate pain

## 2021-06-20 NOTE — DISCHARGE NOTE PROVIDER - HOSPITAL COURSE
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 History obtained from niece (Argelia, power of ). Admitted 2 months ago in April 2021 for heart failure exacerbation. Found to have new onset a fib at that time. Patient was vitally stable in the ED,  found to have:     the CXR showed b/l vascular congestion  [facility] duplex did not show any DVT, BNP 8416, EKG Afib, no ischemic changes, trop neg x2, patient admitted for:     # AC systolic CHF-- EF 43% in April 2021, currently euvolemic   - CXR improved b/l pleural effusion, currently on room air   - s/p 1 dose of IV Lasix with adequate diuresis  - No need for echo currently, will d/c on PO lasix 40 daily, will discontinue HCTZ  - f/u bmp outpatient, monitor urine output    # Hx of DVT  - c/w Eliquis 2.5 mg daily   - repeat duplex negative     # Advanced dementia-  takes seroquel at night, also on memantidine and miratzapine.    # multiloculated cystic liver mass- patient looks healthy and liver enz are normal.

## 2021-07-02 DIAGNOSIS — I50.23 ACUTE ON CHRONIC SYSTOLIC (CONGESTIVE) HEART FAILURE: ICD-10-CM

## 2021-07-02 DIAGNOSIS — Z79.82 LONG TERM (CURRENT) USE OF ASPIRIN: ICD-10-CM

## 2021-07-02 DIAGNOSIS — F32.9 MAJOR DEPRESSIVE DISORDER, SINGLE EPISODE, UNSPECIFIED: ICD-10-CM

## 2021-07-02 DIAGNOSIS — Z66 DO NOT RESUSCITATE: ICD-10-CM

## 2021-07-02 DIAGNOSIS — K21.9 GASTRO-ESOPHAGEAL REFLUX DISEASE WITHOUT ESOPHAGITIS: ICD-10-CM

## 2021-07-02 DIAGNOSIS — F03.90 UNSPECIFIED DEMENTIA WITHOUT BEHAVIORAL DISTURBANCE: ICD-10-CM

## 2021-07-02 DIAGNOSIS — I48.20 CHRONIC ATRIAL FIBRILLATION, UNSPECIFIED: ICD-10-CM

## 2021-07-02 DIAGNOSIS — R09.02 HYPOXEMIA: ICD-10-CM

## 2021-07-02 DIAGNOSIS — Q44.6 CYSTIC DISEASE OF LIVER: ICD-10-CM

## 2021-07-02 DIAGNOSIS — Z79.01 LONG TERM (CURRENT) USE OF ANTICOAGULANTS: ICD-10-CM

## 2021-07-02 DIAGNOSIS — E78.00 PURE HYPERCHOLESTEROLEMIA, UNSPECIFIED: ICD-10-CM

## 2021-07-02 DIAGNOSIS — Z86.16 PERSONAL HISTORY OF COVID-19: ICD-10-CM

## 2021-07-02 DIAGNOSIS — H04.129 DRY EYE SYNDROME OF UNSPECIFIED LACRIMAL GLAND: ICD-10-CM

## 2021-07-02 DIAGNOSIS — I35.1 NONRHEUMATIC AORTIC (VALVE) INSUFFICIENCY: ICD-10-CM

## 2021-07-02 DIAGNOSIS — I11.0 HYPERTENSIVE HEART DISEASE WITH HEART FAILURE: ICD-10-CM

## 2021-07-02 DIAGNOSIS — Z86.718 PERSONAL HISTORY OF OTHER VENOUS THROMBOSIS AND EMBOLISM: ICD-10-CM

## 2021-07-24 ENCOUNTER — INPATIENT (INPATIENT)
Facility: HOSPITAL | Age: 86
LOS: 5 days | Discharge: HOSPICE | End: 2021-07-30
Attending: HOSPITALIST | Admitting: HOSPITALIST
Payer: MEDICARE

## 2021-07-24 VITALS
TEMPERATURE: 98 F | HEIGHT: 64 IN | DIASTOLIC BLOOD PRESSURE: 85 MMHG | RESPIRATION RATE: 18 BRPM | OXYGEN SATURATION: 100 % | HEART RATE: 110 BPM | SYSTOLIC BLOOD PRESSURE: 152 MMHG

## 2021-07-24 LAB
ALBUMIN SERPL ELPH-MCNC: 3.8 G/DL — SIGNIFICANT CHANGE UP (ref 3.5–5.2)
ALP SERPL-CCNC: 232 U/L — HIGH (ref 30–115)
ALT FLD-CCNC: 19 U/L — SIGNIFICANT CHANGE UP (ref 0–41)
ANION GAP SERPL CALC-SCNC: 11 MMOL/L — SIGNIFICANT CHANGE UP (ref 7–14)
AST SERPL-CCNC: 24 U/L — SIGNIFICANT CHANGE UP (ref 0–41)
BASOPHILS # BLD AUTO: 0.04 K/UL — SIGNIFICANT CHANGE UP (ref 0–0.2)
BASOPHILS NFR BLD AUTO: 0.4 % — SIGNIFICANT CHANGE UP (ref 0–1)
BILIRUB SERPL-MCNC: 0.9 MG/DL — SIGNIFICANT CHANGE UP (ref 0.2–1.2)
BUN SERPL-MCNC: 33 MG/DL — HIGH (ref 10–20)
CALCIUM SERPL-MCNC: 9.6 MG/DL — SIGNIFICANT CHANGE UP (ref 8.5–10.1)
CHLORIDE SERPL-SCNC: 110 MMOL/L — SIGNIFICANT CHANGE UP (ref 98–110)
CO2 SERPL-SCNC: 21 MMOL/L — SIGNIFICANT CHANGE UP (ref 17–32)
CREAT SERPL-MCNC: 0.9 MG/DL — SIGNIFICANT CHANGE UP (ref 0.7–1.5)
EOSINOPHIL # BLD AUTO: 0.03 K/UL — SIGNIFICANT CHANGE UP (ref 0–0.7)
EOSINOPHIL NFR BLD AUTO: 0.3 % — SIGNIFICANT CHANGE UP (ref 0–8)
GLUCOSE SERPL-MCNC: 117 MG/DL — HIGH (ref 70–99)
HCT VFR BLD CALC: 39.1 % — SIGNIFICANT CHANGE UP (ref 37–47)
HGB BLD-MCNC: 12 G/DL — SIGNIFICANT CHANGE UP (ref 12–16)
IMM GRANULOCYTES NFR BLD AUTO: 0.5 % — HIGH (ref 0.1–0.3)
LYMPHOCYTES # BLD AUTO: 1.46 K/UL — SIGNIFICANT CHANGE UP (ref 1.2–3.4)
LYMPHOCYTES # BLD AUTO: 13.3 % — LOW (ref 20.5–51.1)
MCHC RBC-ENTMCNC: 26.8 PG — LOW (ref 27–31)
MCHC RBC-ENTMCNC: 30.7 G/DL — LOW (ref 32–37)
MCV RBC AUTO: 87.3 FL — SIGNIFICANT CHANGE UP (ref 81–99)
MONOCYTES # BLD AUTO: 0.83 K/UL — HIGH (ref 0.1–0.6)
MONOCYTES NFR BLD AUTO: 7.6 % — SIGNIFICANT CHANGE UP (ref 1.7–9.3)
NEUTROPHILS # BLD AUTO: 8.55 K/UL — HIGH (ref 1.4–6.5)
NEUTROPHILS NFR BLD AUTO: 77.9 % — HIGH (ref 42.2–75.2)
NRBC # BLD: 0 /100 WBCS — SIGNIFICANT CHANGE UP (ref 0–0)
NT-PROBNP SERPL-SCNC: HIGH PG/ML (ref 0–300)
PLATELET # BLD AUTO: 216 K/UL — SIGNIFICANT CHANGE UP (ref 130–400)
POTASSIUM SERPL-MCNC: 4.9 MMOL/L — SIGNIFICANT CHANGE UP (ref 3.5–5)
POTASSIUM SERPL-SCNC: 4.9 MMOL/L — SIGNIFICANT CHANGE UP (ref 3.5–5)
PROT SERPL-MCNC: 6.6 G/DL — SIGNIFICANT CHANGE UP (ref 6–8)
RBC # BLD: 4.48 M/UL — SIGNIFICANT CHANGE UP (ref 4.2–5.4)
RBC # FLD: 20.7 % — HIGH (ref 11.5–14.5)
SARS-COV-2 RNA SPEC QL NAA+PROBE: SIGNIFICANT CHANGE UP
SODIUM SERPL-SCNC: 142 MMOL/L — SIGNIFICANT CHANGE UP (ref 135–146)
TROPONIN T SERPL-MCNC: <0.01 NG/ML — SIGNIFICANT CHANGE UP
WBC # BLD: 10.97 K/UL — HIGH (ref 4.8–10.8)
WBC # FLD AUTO: 10.97 K/UL — HIGH (ref 4.8–10.8)

## 2021-07-24 PROCEDURE — 99285 EMERGENCY DEPT VISIT HI MDM: CPT | Mod: GC

## 2021-07-24 PROCEDURE — 71045 X-RAY EXAM CHEST 1 VIEW: CPT | Mod: 26

## 2021-07-24 PROCEDURE — 99497 ADVNCD CARE PLAN 30 MIN: CPT | Mod: 25

## 2021-07-24 PROCEDURE — 99223 1ST HOSP IP/OBS HIGH 75: CPT

## 2021-07-24 PROCEDURE — 93010 ELECTROCARDIOGRAM REPORT: CPT

## 2021-07-24 RX ORDER — DOCUSATE SODIUM 100 MG
1 CAPSULE ORAL
Qty: 0 | Refills: 0 | DISCHARGE

## 2021-07-24 RX ORDER — FUROSEMIDE 40 MG
40 TABLET ORAL
Refills: 0 | Status: DISCONTINUED | OUTPATIENT
Start: 2021-07-24 | End: 2021-07-25

## 2021-07-24 RX ORDER — ASPIRIN/CALCIUM CARB/MAGNESIUM 324 MG
81 TABLET ORAL DAILY
Refills: 0 | Status: DISCONTINUED | OUTPATIENT
Start: 2021-07-24 | End: 2021-07-30

## 2021-07-24 RX ORDER — ATORVASTATIN CALCIUM 80 MG/1
1 TABLET, FILM COATED ORAL
Qty: 0 | Refills: 0 | DISCHARGE

## 2021-07-24 RX ORDER — SENNA PLUS 8.6 MG/1
1 TABLET ORAL
Qty: 0 | Refills: 0 | DISCHARGE

## 2021-07-24 RX ORDER — FUROSEMIDE 40 MG
40 TABLET ORAL ONCE
Refills: 0 | Status: COMPLETED | OUTPATIENT
Start: 2021-07-24 | End: 2021-07-24

## 2021-07-24 RX ORDER — MIRTAZAPINE 45 MG/1
2 TABLET, ORALLY DISINTEGRATING ORAL
Qty: 0 | Refills: 0 | DISCHARGE

## 2021-07-24 RX ORDER — METOPROLOL TARTRATE 50 MG
50 TABLET ORAL EVERY 8 HOURS
Refills: 0 | Status: DISCONTINUED | OUTPATIENT
Start: 2021-07-24 | End: 2021-07-26

## 2021-07-24 RX ORDER — APIXABAN 2.5 MG/1
1 TABLET, FILM COATED ORAL
Qty: 0 | Refills: 0 | DISCHARGE

## 2021-07-24 RX ORDER — SENNA PLUS 8.6 MG/1
1 TABLET ORAL AT BEDTIME
Refills: 0 | Status: DISCONTINUED | OUTPATIENT
Start: 2021-07-24 | End: 2021-07-30

## 2021-07-24 RX ORDER — PANTOPRAZOLE SODIUM 20 MG/1
40 TABLET, DELAYED RELEASE ORAL
Refills: 0 | Status: DISCONTINUED | OUTPATIENT
Start: 2021-07-24 | End: 2021-07-30

## 2021-07-24 RX ORDER — MEMANTINE HYDROCHLORIDE 10 MG/1
10 TABLET ORAL DAILY
Refills: 0 | Status: DISCONTINUED | OUTPATIENT
Start: 2021-07-24 | End: 2021-07-30

## 2021-07-24 RX ORDER — ASPIRIN/CALCIUM CARB/MAGNESIUM 324 MG
1 TABLET ORAL
Qty: 0 | Refills: 0 | DISCHARGE

## 2021-07-24 RX ORDER — PANTOPRAZOLE SODIUM 20 MG/1
1 TABLET, DELAYED RELEASE ORAL
Qty: 0 | Refills: 0 | DISCHARGE

## 2021-07-24 RX ORDER — APIXABAN 2.5 MG/1
2.5 TABLET, FILM COATED ORAL EVERY 12 HOURS
Refills: 0 | Status: DISCONTINUED | OUTPATIENT
Start: 2021-07-24 | End: 2021-07-30

## 2021-07-24 RX ORDER — LOSARTAN POTASSIUM 100 MG/1
75 TABLET, FILM COATED ORAL DAILY
Refills: 0 | Status: DISCONTINUED | OUTPATIENT
Start: 2021-07-24 | End: 2021-07-30

## 2021-07-24 RX ORDER — POTASSIUM CHLORIDE 20 MEQ
1 PACKET (EA) ORAL
Qty: 0 | Refills: 0 | DISCHARGE

## 2021-07-24 RX ORDER — MEMANTINE HYDROCHLORIDE 10 MG/1
1 TABLET ORAL
Qty: 0 | Refills: 0 | DISCHARGE

## 2021-07-24 RX ORDER — MIRTAZAPINE 45 MG/1
15 TABLET, ORALLY DISINTEGRATING ORAL AT BEDTIME
Refills: 0 | Status: DISCONTINUED | OUTPATIENT
Start: 2021-07-24 | End: 2021-07-30

## 2021-07-24 RX ORDER — PREDNISOLONE SODIUM PHOSPHATE 1 %
1 DROPS OPHTHALMIC (EYE)
Refills: 0 | Status: DISCONTINUED | OUTPATIENT
Start: 2021-07-24 | End: 2021-07-30

## 2021-07-24 RX ORDER — ATORVASTATIN CALCIUM 80 MG/1
10 TABLET, FILM COATED ORAL DAILY
Refills: 0 | Status: DISCONTINUED | OUTPATIENT
Start: 2021-07-24 | End: 2021-07-30

## 2021-07-24 RX ORDER — LOSARTAN POTASSIUM 100 MG/1
1.5 TABLET, FILM COATED ORAL
Qty: 0 | Refills: 0 | DISCHARGE

## 2021-07-24 RX ORDER — HYDROCHLOROTHIAZIDE 25 MG
25 TABLET ORAL DAILY
Refills: 0 | Status: DISCONTINUED | OUTPATIENT
Start: 2021-07-24 | End: 2021-07-30

## 2021-07-24 RX ORDER — ACETAMINOPHEN 500 MG
650 TABLET ORAL EVERY 6 HOURS
Refills: 0 | Status: DISCONTINUED | OUTPATIENT
Start: 2021-07-24 | End: 2021-07-30

## 2021-07-24 RX ORDER — CHLORHEXIDINE GLUCONATE 213 G/1000ML
1 SOLUTION TOPICAL
Refills: 0 | Status: DISCONTINUED | OUTPATIENT
Start: 2021-07-24 | End: 2021-07-30

## 2021-07-24 RX ADMIN — APIXABAN 2.5 MILLIGRAM(S): 2.5 TABLET, FILM COATED ORAL at 18:52

## 2021-07-24 RX ADMIN — SENNA PLUS 1 TABLET(S): 8.6 TABLET ORAL at 21:25

## 2021-07-24 RX ADMIN — Medication 1 DROP(S): at 18:52

## 2021-07-24 RX ADMIN — MIRTAZAPINE 15 MILLIGRAM(S): 45 TABLET, ORALLY DISINTEGRATING ORAL at 21:27

## 2021-07-24 RX ADMIN — Medication 40 MILLIGRAM(S): at 15:35

## 2021-07-24 RX ADMIN — Medication 50 MILLIGRAM(S): at 21:27

## 2021-07-24 NOTE — H&P ADULT - ASSESSMENT
89F with PMH of dementia (AAOx0), HFrEF (EF 43%), HTN, HLD, Afib (on eliquis), GERD, liver mass, DVT, COVID-19 infection 4/2020 presents from residence at [facility] for shortness of breath.    # HF exacerbation with pulmonary edema  - multiple hospitalizations in the past  - CXR showed b/l interstitial opacities and pleural effusions  - BNP 21 K, 12 K 1 month ago  - saturating well on RA at the time of encounter  - s/p lasix 40 mg iv in the ED  - c/w lasix 40 mg iv bid  - Fluid restriction, daily weight, strict I/Os   - c/s cardiology ( Dr Harper)  - EF 43 in 4/21  - troponin first set negative, f/u 2nd set    # Chronic Afib  - c/w eliquis and metoprolol  - rate controlled at the time of encounter  - admit to telemetry    # Dementia  - AAO*0 at baseline, unable to do her ADLs  - bedbound at baseline  - c/w memantine    # HTN  - c/w losartan and metoprolol    # DLD  - c/w statins    # Hx of DVT  - finished course of AC  - on eliquis for Afib    # DVT ppx: eliquis  # PPI ppx: pantoprazole  # ACTIVITY: bedbound  # DIET: DASH, fluid restriction  # DISPO: From [facility] residence home

## 2021-07-24 NOTE — ED PROVIDER NOTE - ATTENDING CONTRIBUTION TO CARE
89 year old female, pmhx as documented presenting with low O2 saturation from home on RA. Per report, patient was in the 70s on RA which improved with 2L O2 NC. Patient is at baseline mental status per son at bedside. Per son, patient had increased WOB this AM but no known pain and no known fevers, N/V/D, blood in stool, recent travel or sick contacts.    Vital Signs: I have reviewed the initial vital signs.  Constitutional: NAD, well-nourished, appears stated age, no acute distress.  HEENT: Airway patent, moist MM, no erythema/swelling/deformity of oral structures. EOMI, PERRLA.  CV: regular rate, regular rhythm, well-perfused extremities, 2+ b/l DP and radial pulses equal.  Lungs: (+) b/l rhonchi, no increased WOB.  ABD: NTND, no guarding or rebound, no pulsatile mass, no hernias.   MSK: Neck supple, nontender, nl ROM, no stepoff. Chest nontender. Back nontender in TLS spine or to b/l bony structures or flanks. Ext nontender, nl rom, no deformity.   INTEG: Skin warm, dry, no rash.  NEURO: A&Ox0 (baseline), moving all extremities  PSYCH: At baseline    No respiratory distress on O2 NC, but desaturates on RA. WIll obtain EKG, CXR, labs, re-eval.

## 2021-07-24 NOTE — ED ADULT NURSE NOTE - CHIEF COMPLAINT QUOTE
shortness of breath, patient being sent in my adult home for low SPO2, patient placed on 2L nc PTA. as per EMS " patient has a history of dementia and is at baseline".  * patient has a MOLST form being faxed by Birgit Mclaughlin [phone]  [facility] [phone] (family)

## 2021-07-24 NOTE — PATIENT PROFILE ADULT - VISION (WITH CORRECTIVE LENSES IF THE PATIENT USUALLY WEARS THEM):
Detail Level: Zone Normal vision: sees adequately in most situations; can see medication labels, newsprint Continue Regimen: Absorica Otc Regimen: Apply Aquaphor on lips for dryness\\nGentle cleansers such as CeraVe hydrating

## 2021-07-24 NOTE — ED PROVIDER NOTE - OBJECTIVE STATEMENT
PT is a 89F with PMH of dementia (AAOx0), HFrEF (43% in April '21), HTN, HLD, Afib (on eliquis), GERD, liver mass, DVT, COVID-19 infection 4/2020 BIBA from residence at [facility] for low O2 sats. PT was placed on 2L NC in ambulance and sats improved to 100%. PTs son at bedside, states patient has been at baseline with no recent complaints. States that PT is well cared for at [facility] and continuously monitored for falls, etc..

## 2021-07-24 NOTE — H&P ADULT - NSHPLABSRESULTS_GEN_ALL_CORE
12.0   10.97 )-----------( 216      ( 24 Jul 2021 14:06 )             39.1   07-24    142  |  110  |  33<H>  ----------------------------<  117<H>  4.9   |  21  |  0.9    Ca    9.6      24 Jul 2021 14:06    TPro  6.6  /  Alb  3.8  /  TBili  0.9  /  DBili  x   /  AST  24  /  ALT  19  /  AlkPhos  232<H>  07-24    < from: Xray Chest 1 View AP/PA (07.24.21 @ 13:00) >    Impression:    Diffuse bilateral pulmonary opacities and effusions greater on the right.    < end of copied text >

## 2021-07-24 NOTE — ED PROVIDER NOTE - NS ED ROS FT
Unable to obtain from PT, obtained through son-in-law at bedside    CONSTITUTIONAL - No acute distress , No weight change  SKIN - No rash  HEMATOLOGIC - No abnormal bleeding or bruising  EYES - , No conjunctival injection, No drainage   ENT -, No sore throat, No neck pain, No rhinorrhea, No ear pain  RESPIRATORY - No shortness of breath, No cough  CARDIAC -No chest pain, No palpitations  GI - No abdominal pain, No nausea, No vomiting, No diarrhea, No constipation, No bright red blood per rectum or melena. No flank pain  - No dysuria, frequency, hematuria.   MUSCULOSKELETAL - No joint paint, No swelling, No back pain  NEUROLOGIC - No numbness, No focal weakness, No headache, No dizziness  ALLERGIC- no pruritis

## 2021-07-24 NOTE — ED PROVIDER NOTE - CLINICAL SUMMARY MEDICAL DECISION MAKING FREE TEXT BOX
Patient presented with acute onset dyspnea, hypoxic on RA but improved with low dose supplemental O2 NC. On arrival while on O2, afebrile, HD stable, normal O2 saturation. Obtained EKG which was non-specific but no evidence of STEMI. Obtained labs which showed elevated pro-bnp from baseline but otherwise were grossly unremarkable including no significant leukocytosis, anemia, signs of dehydration/SARAH, transaminitis or significant electrolyte abnormalities. Trop negative. CXR showed (+) b/l congestion, likely fluid overload from CHF. Patient remained stable in ED. Given lasix IV. Will admit for further management given new O2 requirement and need for IV diuresis. Son agreeable with plan. HD stable at time of admission.

## 2021-07-24 NOTE — ED PROVIDER NOTE - NS ED MD DISPO SPECIAL CONSIDERATION1
Ordered mammogram screening bilateral      Mail mammogram order to patient   Low Suspicion of COVID-19

## 2021-07-24 NOTE — H&P ADULT - HISTORY OF PRESENT ILLNESS
89F with PMH of dementia (AAOx0), HFrEF (EF 43%), HTN, HLD, Afib (on eliquis), GERD, liver mass, DVT, COVID-19 infection 4/2020 presents from residence at [facility] for shortness of breath. Patient cannot provide any history, it was obtained from her nephew at bedside. As per him her spo2 was low in the residence home this morning when she was assessed. She had multiple hospitalizations for heart failure exacerbation. She is still able to lie flat and denied any other associated symptoms.    In the ED VS showed tachycardia .  CXR showed bilateral opacities and pleural effusions.  she received 40 mg of iv lasix. 89F with PMH of dementia (AAOx0), HFrEF (EF 43%), HTN, HLD, Afib (on eliquis), GERD, liver mass, DVT, COVID-19 infection 4/2020 presents from residence at [facility] for shortness of breath. Patient cannot provide any history, it was obtained from her nephew at bedside. As per him her spo2 was low in the residence home this morning when she was assessed. She had multiple hospitalizations for heart failure exacerbation. She is still able to lie flat and denied any other associated symptoms.    In the ED VS showed tachycardia .  CXR showed bilateral opacities and pleural effusions.  she received 40 mg of iv lasix.

## 2021-07-24 NOTE — ED ADULT TRIAGE NOTE - CHIEF COMPLAINT QUOTE
shortness of breath, patient being sent in my adult home for low SPO2, patient placed on 2L nc PTA. as per EMS " patient has a history of dementia and is at baseline".  * patient has a MOLST form being faxed by Birgit Mclaughlin [phone]  [facility] [phone] (family)

## 2021-07-24 NOTE — ED PROVIDER NOTE - CARE PLAN
Principal Discharge DX:	Heart failure with reduced ejection fraction   Principal Discharge DX:	Fluid overload  Secondary Diagnosis:	Hypoxia

## 2021-07-24 NOTE — ED ADULT NURSE NOTE - OBJECTIVE STATEMENT
Pt HX- Dementia came with EMS  C/O SOB , low SPO2 A/O times 1 , forgetful restless SPO2-93-94% on R.A 2 LNCO2 is given ,comfort provide o n the bed ,HOB elevated 35 degree MD aware at bed site.

## 2021-07-24 NOTE — H&P ADULT - ATTENDING COMMENTS
Patient seen and examined on 7/24/2021 at 23:10 Patient seen and examined on 7/24/2021 at 23:10    HPI: Spoke with niece and HCP Argelia [phone]  89y/o woman with a PMH of dementia (AAOx0), HFrEF (EF 43%), HTN, HLD, Afib (on eliquis), GERD, liver mass, DVT, COVID-19 infection 4/2020 presents from residence at [facility] for shortness of breath. history obtained form chart and niece. She was noted to be hypoxic and dyspneic at [facility] and sent to ED for further evaluation. She is still able to lie flat and denied any other associated symptoms. She has a significant history of sundowning/delirium when hospitalized   CXR showed b/l vasc congestion, BNP 21k, and requiring supplemental O2.     REVIEW OF SYSTEMS:  CONSTITUTIONAL:  No weakness, fevers, chills, night sweats, weight loss  EYES/ENT: No visual changes. No vertigo or dysphagia  NECK: No neck pain or stiffness  RESPIRATORY: No cough, wheezing, hemoptysis. + shortness of breath  CARDIOVASCULAR: No chest pain or palpitations. No lower extremity edema  GASTROINTESTINAL: No abdominal pain. No nausea, vomiting, diarrhea, or hematemesis  GENITOURINARY: No dysuria or hematuria   NEUROLOGICAL: No focal numbness or weakness  SKIN: No rashes or itching  HEMATOLOGIC: No easy bruising or prolonged bleeding.      PHYSICAL EXAM:  GENERAL: NAD, well-developed, Non-toxic, stated age   HEAD:  Atraumatic, Normocephalic  EYES: EOMI, Sclera White   NECK: Supple, No JVD  CHEST/LUNG: b/l crackles; No wheezing, rhonchi, or crackles  HEART: Regular rate and rhythm; s1, s2, No murmurs, rubs, or gallops  ABDOMEN: Soft, Nontender, Nondistended; Bowel sounds present, No rebound or guarding noted   EXTREMITIES:  +2 pitting lower extremity edema. No calf tenderness to palpation.  No clubbing or cyanosis  PSYCH: AAOx0, agitated but able to be calmed, not anxious  NEUROLOGY: non-focal  SKIN: No rashes or lesions      ASSESSMENT AND PLAN:  Heart failure with reduced ejection fraction exacerbation  Acute hypoxic respiratory failure   -Cont with IV lasix 40mg BID  -cont tele monitoring, trend cardiac enzymes, had recent echo in April.   -Cont beta blocker and ACE-I. Strict I/O, daily weights, and monitor electrolytes carefully while aggressively diuresing.   -Family requesting cardio evaluation with Dr Harper     Dementia: minimize sleep disturbances. Encourage family visitation when able: familiar voices/faces/objects. Exposure to daylight during wake time hours. Limit sedating medications. Frequent reorientation.   -Cont memantine     Chronic Atrial Fibrillation: on eliquis  -Currently rate controlled on metoprolol    HTN/HLD: cont home medications     Hx of DVT: on eliquis   GERD: protonix  GOC: DNR/ DNI, no BiPAP, no tube feeds. IVF and Abx trial are ok. MOLST in chart     My note supersedes the residents in the event of a discrepancy.

## 2021-07-24 NOTE — ED ADULT NURSE NOTE - PLAN OF CARE
[de-identified] : General appearance: well nourished and hydrated, pleasant, alert and oriented x 3, cooperative.\par Cardiovascular: no apparent abnormalities, no lower leg edema, no varicosities, pedal pulses are palpable.\par Neurologic: sensation is normal, no muscle weakness in upper or lower extremities\par Dermatologic no apparent skin lesions, moist, warm, no rash.\par Gait: nonantalgic.\par \par Left knee\par Inspection: no effusion or erythema.\par Wounds: none.\par Alignment: varus deformity \par Palpation: medial joint line tenderness \par ROM: 0-90 degrees \par Positive Madelyn's test \par Ligamentous laxity: all ligaments appear stable\par Muscle Test: good quad strength.\par \par Right knee\par Inspection: no effusion or erythema.\par Wounds: none.\par Alignment: normal.\par Palpation: no specific tenderness on palpation.\par ROM: 0-115 degrees \par Ligamentous laxity: all ligaments appear stable\par Muscle Test: good quad strength.\par \par Left hip\par Inspection: No swelling or ecchymosis.\par Wounds: none.\par Palpation: non-tender.\par Stability: no instability.\par Strength: 5/5 all motor groups.\par ROM: no pain with FROM.\par Leg length: equal.\par \par Right hip\par Inspection: No swelling or ecchymosis.\par Wounds: none.\par Palpation: non-tender.\par Stability: no instability.\par Strength: 5/5 all motor groups.\par ROM: Flexion to 70 ,ER 30 , ABD 30 \par Leg length: equal.\par \par   [de-identified] : Radiographs done today AP lateral and skyline of both knees shows significantly narrowed but maintained medial joint space in the left knee, 50 percent joint space narrowing of the medial compartment of the right knee.  Call bell

## 2021-07-25 LAB
ALBUMIN SERPL ELPH-MCNC: 3.8 G/DL — SIGNIFICANT CHANGE UP (ref 3.5–5.2)
ALP SERPL-CCNC: 236 U/L — HIGH (ref 30–115)
ALT FLD-CCNC: 18 U/L — SIGNIFICANT CHANGE UP (ref 0–41)
ANION GAP SERPL CALC-SCNC: 17 MMOL/L — HIGH (ref 7–14)
AST SERPL-CCNC: 18 U/L — SIGNIFICANT CHANGE UP (ref 0–41)
BASOPHILS # BLD AUTO: 0.05 K/UL — SIGNIFICANT CHANGE UP (ref 0–0.2)
BASOPHILS NFR BLD AUTO: 0.5 % — SIGNIFICANT CHANGE UP (ref 0–1)
BILIRUB SERPL-MCNC: 1.1 MG/DL — SIGNIFICANT CHANGE UP (ref 0.2–1.2)
BUN SERPL-MCNC: 31 MG/DL — HIGH (ref 10–20)
CALCIUM SERPL-MCNC: 9.4 MG/DL — SIGNIFICANT CHANGE UP (ref 8.5–10.1)
CHLORIDE SERPL-SCNC: 107 MMOL/L — SIGNIFICANT CHANGE UP (ref 98–110)
CO2 SERPL-SCNC: 22 MMOL/L — SIGNIFICANT CHANGE UP (ref 17–32)
CREAT SERPL-MCNC: 1.1 MG/DL — SIGNIFICANT CHANGE UP (ref 0.7–1.5)
EOSINOPHIL # BLD AUTO: 0.08 K/UL — SIGNIFICANT CHANGE UP (ref 0–0.7)
EOSINOPHIL NFR BLD AUTO: 0.8 % — SIGNIFICANT CHANGE UP (ref 0–8)
GLUCOSE SERPL-MCNC: 95 MG/DL — SIGNIFICANT CHANGE UP (ref 70–99)
HCT VFR BLD CALC: 36.8 % — LOW (ref 37–47)
HGB BLD-MCNC: 11.3 G/DL — LOW (ref 12–16)
IMM GRANULOCYTES NFR BLD AUTO: 0.3 % — SIGNIFICANT CHANGE UP (ref 0.1–0.3)
LYMPHOCYTES # BLD AUTO: 1.71 K/UL — SIGNIFICANT CHANGE UP (ref 1.2–3.4)
LYMPHOCYTES # BLD AUTO: 16.3 % — LOW (ref 20.5–51.1)
MCHC RBC-ENTMCNC: 26.7 PG — LOW (ref 27–31)
MCHC RBC-ENTMCNC: 30.7 G/DL — LOW (ref 32–37)
MCV RBC AUTO: 86.8 FL — SIGNIFICANT CHANGE UP (ref 81–99)
MONOCYTES # BLD AUTO: 0.63 K/UL — HIGH (ref 0.1–0.6)
MONOCYTES NFR BLD AUTO: 6 % — SIGNIFICANT CHANGE UP (ref 1.7–9.3)
NEUTROPHILS # BLD AUTO: 7.99 K/UL — HIGH (ref 1.4–6.5)
NEUTROPHILS NFR BLD AUTO: 76.1 % — HIGH (ref 42.2–75.2)
NRBC # BLD: 0 /100 WBCS — SIGNIFICANT CHANGE UP (ref 0–0)
PLATELET # BLD AUTO: 201 K/UL — SIGNIFICANT CHANGE UP (ref 130–400)
POTASSIUM SERPL-MCNC: 4 MMOL/L — SIGNIFICANT CHANGE UP (ref 3.5–5)
POTASSIUM SERPL-SCNC: 4 MMOL/L — SIGNIFICANT CHANGE UP (ref 3.5–5)
PROT SERPL-MCNC: 6.3 G/DL — SIGNIFICANT CHANGE UP (ref 6–8)
RBC # BLD: 4.24 M/UL — SIGNIFICANT CHANGE UP (ref 4.2–5.4)
RBC # FLD: 20.3 % — HIGH (ref 11.5–14.5)
SODIUM SERPL-SCNC: 146 MMOL/L — SIGNIFICANT CHANGE UP (ref 135–146)
WBC # BLD: 10.49 K/UL — SIGNIFICANT CHANGE UP (ref 4.8–10.8)
WBC # FLD AUTO: 10.49 K/UL — SIGNIFICANT CHANGE UP (ref 4.8–10.8)

## 2021-07-25 PROCEDURE — 99233 SBSQ HOSP IP/OBS HIGH 50: CPT

## 2021-07-25 RX ORDER — FUROSEMIDE 40 MG
40 TABLET ORAL DAILY
Refills: 0 | Status: DISCONTINUED | OUTPATIENT
Start: 2021-07-26 | End: 2021-07-28

## 2021-07-25 RX ORDER — FUROSEMIDE 40 MG
20 TABLET ORAL ONCE
Refills: 0 | Status: COMPLETED | OUTPATIENT
Start: 2021-07-25 | End: 2021-07-25

## 2021-07-25 RX ADMIN — APIXABAN 2.5 MILLIGRAM(S): 2.5 TABLET, FILM COATED ORAL at 17:31

## 2021-07-25 RX ADMIN — Medication 81 MILLIGRAM(S): at 13:57

## 2021-07-25 RX ADMIN — ATORVASTATIN CALCIUM 10 MILLIGRAM(S): 80 TABLET, FILM COATED ORAL at 13:58

## 2021-07-25 RX ADMIN — Medication 1 DROP(S): at 17:25

## 2021-07-25 RX ADMIN — Medication 20 MILLIGRAM(S): at 17:31

## 2021-07-25 RX ADMIN — Medication 1 DROP(S): at 06:14

## 2021-07-25 RX ADMIN — Medication 50 MILLIGRAM(S): at 13:59

## 2021-07-25 RX ADMIN — MEMANTINE HYDROCHLORIDE 10 MILLIGRAM(S): 10 TABLET ORAL at 13:59

## 2021-07-25 RX ADMIN — LOSARTAN POTASSIUM 75 MILLIGRAM(S): 100 TABLET, FILM COATED ORAL at 06:14

## 2021-07-25 RX ADMIN — CHLORHEXIDINE GLUCONATE 1 APPLICATION(S): 213 SOLUTION TOPICAL at 06:24

## 2021-07-25 RX ADMIN — SENNA PLUS 1 TABLET(S): 8.6 TABLET ORAL at 21:54

## 2021-07-25 RX ADMIN — APIXABAN 2.5 MILLIGRAM(S): 2.5 TABLET, FILM COATED ORAL at 06:14

## 2021-07-25 RX ADMIN — PANTOPRAZOLE SODIUM 40 MILLIGRAM(S): 20 TABLET, DELAYED RELEASE ORAL at 06:14

## 2021-07-25 RX ADMIN — Medication 50 MILLIGRAM(S): at 06:14

## 2021-07-25 RX ADMIN — Medication 25 MILLIGRAM(S): at 06:14

## 2021-07-25 RX ADMIN — MIRTAZAPINE 15 MILLIGRAM(S): 45 TABLET, ORALLY DISINTEGRATING ORAL at 21:54

## 2021-07-25 RX ADMIN — Medication 50 MILLIGRAM(S): at 21:54

## 2021-07-25 RX ADMIN — Medication 40 MILLIGRAM(S): at 06:51

## 2021-07-25 NOTE — GOALS OF CARE CONVERSATION - ADVANCED CARE PLANNING - CONVERSATION DETAILS
Discussed End of life care.   She has clear understanding of the patients previous wishes and her debility. She is to remained DNR/ DNI, no BiPAP, No tube feeds, IVF trail and Abx are ok.

## 2021-07-25 NOTE — PROGRESS NOTE ADULT - SUBJECTIVE AND OBJECTIVE BOX
SUBJECTIVE:    Patient is a 89y old Female who presents with a chief complaint of shortness of breath (24 Jul 2021 16:39)    Currently admitted to medicine with the primary diagnosis of Heart failure with reduced ejection fraction     Today is hospital day 1d. Staff reports no acute events. Patient unable to provide any hx.     PAST MEDICAL & SURGICAL HISTORY  HTN (hypertension)    High cholesterol    Dementia    No significant past surgical history      SOCIAL HISTORY:  Negative for smoking/alcohol/drug use.     ALLERGIES:  No Known Allergies    MEDICATIONS:  STANDING MEDICATIONS  apixaban 2.5 milliGRAM(s) Oral every 12 hours  aspirin enteric coated 81 milliGRAM(s) Oral daily  atorvastatin Oral Tab/Cap - Peds 10 milliGRAM(s) Oral daily  chlorhexidine 4% Liquid 1 Application(s) Topical <User Schedule>  furosemide   Injectable 40 milliGRAM(s) IV Push two times a day  hydrochlorothiazide 25 milliGRAM(s) Oral daily  losartan 75 milliGRAM(s) Oral daily  memantine 10 milliGRAM(s) Oral daily  metoprolol tartrate Oral Tab/Cap - Peds 50 milliGRAM(s) Oral every 8 hours  mirtazapine 15 milliGRAM(s) Oral at bedtime  pantoprazole    Tablet 40 milliGRAM(s) Oral before breakfast  prednisoLONE acetate 1% Suspension 1 Drop(s) Both EYES two times a day  senna 8.6 milliGRAM(s) Oral Tablet - Peds 1 Tablet(s) Oral at bedtime    PRN MEDICATIONS  acetaminophen   Tablet .. 650 milliGRAM(s) Oral every 6 hours PRN    VITALS:   T(F): 97.1  HR: 70  BP: 152/72  RR: 18  SpO2: 94%    LABS:                        11.3   10.49 )-----------( 201      ( 25 Jul 2021 06:04 )             36.8     07-25    146  |  107  |  31<H>  ----------------------------<  95  4.0   |  22  |  1.1    Ca    9.4      25 Jul 2021 06:04    TPro  6.3  /  Alb  3.8  /  TBili  1.1  /  DBili  x   /  AST  18  /  ALT  18  /  AlkPhos  236<H>  07-25        CARDIAC MARKERS ( 24 Jul 2021 14:06 )  x     / <0.01 ng/mL / x     / x     / x          RADIOLOGY:  Xray Chest 1 View AP/PA (07.24.21): Diffuse bilateral pulmonary opacities and effusions greater on the right.      PHYSICAL EXAM:  GEN: No acute distress, moaning   LUNGS: Clear to auscultation bilaterally   HEART: S1/S2 present. RRR.   ABD: Soft, non-tender, non-distended. Bowel sounds present  EXT: NC/NC/NE/2+PP/HALLMAN  NEURO: AAOX1  SKIN intact

## 2021-07-26 DIAGNOSIS — Z71.89 OTHER SPECIFIED COUNSELING: ICD-10-CM

## 2021-07-26 DIAGNOSIS — I50.20 UNSPECIFIED SYSTOLIC (CONGESTIVE) HEART FAILURE: ICD-10-CM

## 2021-07-26 DIAGNOSIS — F03.90 UNSPECIFIED DEMENTIA WITHOUT BEHAVIORAL DISTURBANCE: ICD-10-CM

## 2021-07-26 DIAGNOSIS — Z51.5 ENCOUNTER FOR PALLIATIVE CARE: ICD-10-CM

## 2021-07-26 LAB
COVID-19 SPIKE DOMAIN AB INTERP: POSITIVE
COVID-19 SPIKE DOMAIN ANTIBODY RESULT: >250 U/ML — HIGH
SARS-COV-2 IGG+IGM SERPL QL IA: >250 U/ML — HIGH
SARS-COV-2 IGG+IGM SERPL QL IA: POSITIVE
TROPONIN T SERPL-MCNC: <0.01 NG/ML — SIGNIFICANT CHANGE UP

## 2021-07-26 PROCEDURE — 99222 1ST HOSP IP/OBS MODERATE 55: CPT

## 2021-07-26 PROCEDURE — 71045 X-RAY EXAM CHEST 1 VIEW: CPT | Mod: 26

## 2021-07-26 PROCEDURE — 99497 ADVNCD CARE PLAN 30 MIN: CPT | Mod: 25

## 2021-07-26 PROCEDURE — 99233 SBSQ HOSP IP/OBS HIGH 50: CPT

## 2021-07-26 RX ORDER — MAGNESIUM SULFATE 500 MG/ML
2 VIAL (ML) INJECTION ONCE
Refills: 0 | Status: COMPLETED | OUTPATIENT
Start: 2021-07-26 | End: 2021-07-26

## 2021-07-26 RX ADMIN — Medication 81 MILLIGRAM(S): at 11:38

## 2021-07-26 RX ADMIN — APIXABAN 2.5 MILLIGRAM(S): 2.5 TABLET, FILM COATED ORAL at 17:06

## 2021-07-26 RX ADMIN — SENNA PLUS 1 TABLET(S): 8.6 TABLET ORAL at 22:06

## 2021-07-26 RX ADMIN — Medication 1 DROP(S): at 17:06

## 2021-07-26 RX ADMIN — Medication 50 MILLIGRAM(S): at 05:11

## 2021-07-26 RX ADMIN — Medication 40 MILLIGRAM(S): at 06:10

## 2021-07-26 RX ADMIN — ATORVASTATIN CALCIUM 10 MILLIGRAM(S): 80 TABLET, FILM COATED ORAL at 11:38

## 2021-07-26 RX ADMIN — PANTOPRAZOLE SODIUM 40 MILLIGRAM(S): 20 TABLET, DELAYED RELEASE ORAL at 05:10

## 2021-07-26 RX ADMIN — Medication 25 MILLIGRAM(S): at 05:11

## 2021-07-26 RX ADMIN — LOSARTAN POTASSIUM 75 MILLIGRAM(S): 100 TABLET, FILM COATED ORAL at 05:11

## 2021-07-26 RX ADMIN — MIRTAZAPINE 15 MILLIGRAM(S): 45 TABLET, ORALLY DISINTEGRATING ORAL at 22:05

## 2021-07-26 RX ADMIN — MEMANTINE HYDROCHLORIDE 10 MILLIGRAM(S): 10 TABLET ORAL at 11:38

## 2021-07-26 RX ADMIN — APIXABAN 2.5 MILLIGRAM(S): 2.5 TABLET, FILM COATED ORAL at 05:10

## 2021-07-26 RX ADMIN — CHLORHEXIDINE GLUCONATE 1 APPLICATION(S): 213 SOLUTION TOPICAL at 05:11

## 2021-07-26 RX ADMIN — Medication 25 GRAM(S): at 12:37

## 2021-07-26 RX ADMIN — Medication 1 DROP(S): at 05:12

## 2021-07-26 NOTE — CONSULT NOTE ADULT - PROBLEM SELECTOR RECOMMENDATION 3
see above for details; in sum - continue current med plan with limited medical interventions (no artificial feeds, BiPap)  hospice consult - [facility] as that is the service in the [facility] where patient lives - for informational purposes -Continue current med plan with limited medical interventions (no artificial feeds, BiPap)  -hospice consult - [facility] as that is the service in the [facility] where patient lives - for informational purposes

## 2021-07-26 NOTE — CONSULT NOTE ADULT - CONSULT REQUESTED DATE/TIME
26-Jul-2021 17:40
You have [facility] Your partner will need to be treated prior to resuming sexual activity to avoid re-infection. The final results of your gonorrhea and chlamydia test will be resulted in 2-3 days and you may contact the ER or your Primary Care Provider for these results. 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 Exposure to Sexually Transmitted Infections: Care Instructions  Your Care Instructions    Sexually transmitted infections (STIs) are those diseases spread by sexual contact. There are at least 20 different STIs, including chlamydia, gonorrhea, syphilis, and human immunodeficiency virus (HIV), which causes AIDS. Bacteria-caused STIs can be treated and cured. STIs caused by viruses, such as HIV, can be treated but not cured. Some STIs can reduce a woman's chances of getting pregnant in the future. STIs are spread during sexual contact, such as vaginal intercourse and oral or anal sex. Follow-up care is a key part of your treatment and safety. Be sure to make and go to all appointments, and call your doctor if you are having problems. It's also a good idea to know your test results and keep a list of the medicines you take. How can you care for yourself at home? · Your doctor may have given you a shot of antibiotics. If your doctor prescribed antibiotic pills, take them as directed. Do not stop taking them just because you feel better. You need to take the full course of antibiotics. · Do not have sexual contact while you have symptoms of an STI or are being treated for an STI. · Tell your sex partner (or partners) that he or she will need treatment. · If you are a woman, do not douche. Douching changes the normal balance of bacteria in the vagina and may spread an infection up into your reproductive organs.   To prevent exposure to STIs in the future  · Use latex condoms every time you have
sex. Use them from the beginning to the end of sexual contact. · Talk to your partner before you have sex. Find out if he or she has or is at risk for any STI. Keep in mind that a person may be able to spread an STI even if he or she does not have symptoms. · Do not have sex if you are being treated for an STI. · Do not have sex with anyone who has symptoms of an STI, such as sores on the genitals or mouth. · Having one sex partner (who does not have STIs and does not have sex with anyone else) is a good way to avoid STIs. When should you call for help? Call your doctor now or seek immediate medical care if:  ? · You have new pain in your belly or pelvis. ? · You have symptoms of a urinary tract infection. These may include:  ¨ Pain or burning when you urinate. ¨ A frequent need to urinate without being able to pass much urine. ¨ Pain in the flank, which is just below the rib cage and above the waist on either side of the back. ¨ Blood in your urine. ¨ A fever. ? · You have new or worsening pain or swelling in the scrotum. ? Watch closely for changes in your health, and be sure to contact your doctor if:  ? · You have unusual vaginal bleeding. ? · You have a discharge from the vagina or penis. ? · You have any new symptoms, such as sores, bumps, rashes, blisters, or warts. ? · You have itching, tingling, pain, or burning in the genital or anal area. ? · You think you may have an STI. Where can you learn more? Go to http://cedric-dario.info/. Enter N645 in the search box to learn more about \"Exposure to Sexually Transmitted Infections: Care Instructions. \"  Current as of: March 20, 2017  Content Version: 11.4  © 6592-0007 [employer]. Care instructions adapted under license by [employer] (which disclaims liability or warranty for this information).  If you have questions about a medical condition or this instruction, always ask your healthcare
professional. [address] any warranty or liability for your use of this information. Genital Herpes: Care Instructions  Your Care Instructions  Genital herpes is caused by a virus called herpes simplex. There are two types of this virus. Type 2 is the type that usually causes genital herpes. But type 1 can also cause it. Type 1 is the type that causes cold sores. Genital herpes is a sexually transmitted infection (STI). The most common way to get it is through sexual or other contact with someone who has herpes. For example, the virus can spread from a sore in the genital area to the lips. And it can spread from a cold sore on the lips to the genital area. Some people are surprised to find out that they have herpes or that they gave it to someone else. This is because a lot of people who have it don't know that they have it. They may not get sores or they may have sores that they cannot see. But the virus can still be spread by a person who does not have obvious sores or symptoms. There is no cure for herpes, but antiviral medicine can help you feel better and help prevent more outbreaks. This medicine may also lower the chance of spreading the virus. Follow-up care is a key part of your treatment and safety. Be sure to make and go to all appointments, and call your doctor if you are having problems. It's also a good idea to know your test results and keep a list of the medicines you take. How can you care for yourself at home? · Be safe with medicines. If your doctor prescribes medicine, take it exactly as prescribed. Call your doctor if you think you are having a problem with your medicine. You will get more details on the specific medicines your doctor prescribes. · To reduce the pain and itching from herpes sores:  ¨ Wash the area with water 3 or 4 times a day. ¨ Keep the sores clean and dry in between baths or showers. You may want to let the sores air dry.  This may feel
better than a towel. ¨ Wear cotton underwear. Cotton absorbs moisture well. ¨ Take an over-the-counter pain medicine, such as acetaminophen (Tylenol), ibuprofen (Advil, Motrin), or naproxen (Aleve). Read and follow all instructions on the label. Do not take two or more pain medicines at the same time unless the doctor told you to. Many pain medicines have acetaminophen, which is Tylenol. Too much acetaminophen (Tylenol) can be harmful. To prevent the spread of genital herpes  · Latex condoms are a good way to reduce the risk of spreading the virus. But you can still get the virus even if you use a condom. For the best protection, use condoms every time you have sex, from the beginning to the end of sexual contact. Remember that you can infect someone even if you do not have obvious symptoms or sores. · Avoid sexual contact when you have symptoms. Symptoms include sores, tingling, or pain. · Wash your hands if you touch a sore. In some cases, especially if this is your first herpes outbreak, you can spread the virus to other parts of your body or to other people. · Having one sex partner (who does not have STIs and does not have sex with anyone else) is a good way to avoid STIs. · Talk to your sex partner or partners about genital herpes. · Encourage your sex partners to get a blood test to see if they have been infected. When should you call for help? Call your doctor now or seek immediate medical care if:  ? · You have a new fever. ? · There is increasing redness or red streaks around herpes sores. ? Watch closely for changes in your health, and be sure to contact your doctor if:  ? · You have herpes and you think you might be pregnant. ? · You have an outbreak of herpes sores, and the sores are not healing. ? · You have frequent outbreaks of genital herpes sores. ? · You are unable to pass urine or are constipated. ? · You want to start antiviral medicine.    ? · You do not get better as
expected. Where can you learn more? Go to http://cedric-dario.info/. Enter D935 in the search box to learn more about \"Genital Herpes: Care Instructions. \"  Current as of: March 20, 2017  Content Version: 11.4  © 7240-9368 [employer]. Care instructions adapted under license by [employer] (which disclaims liability or warranty for this information). If you have questions about a medical condition or this instruction, always ask your healthcare professional. [place] [address] any warranty or liability for your use of this information. Learning About Safer Sex for Teens  What is safer sex? Safer sex is a way to help you avoid an infection spread through sex. It can also help prevent pregnancy. It may seems strange or uncomfortable to talk about sex. But the more you know, the safer you are. And the actions you take before sex can help keep you from getting an infection like herpes or a deadly infection like HIV. You can get a sexually transmitted infection (STI) from any kind of sexual contact, not just intercourse. STIs are spread through skin-to-skin contact between the genitals. You can also get an STI from contact with body fluids such as semen, vaginal fluids, and blood (including menstrual blood). This means you can get an STI from vaginal sex, anal sex, or oral sex. You may have heard this before, but not having sex at all is still the best way to prevent pregnancy and any STI. How can you protect yourself from STIs? A condom is one of the best ways to lower your chance of STIs. You may know about condoms for men. Did you know there are condoms for women too? The female condom is a tube of soft plastic with a closed end that is put deep into the vagina. · Use condoms or female condoms each time and every time you have sex. ¨ Condoms come in several sizes. Make sure you use the right size. A condom that is too small can break easily.
A condom that is too big can slip off during sex. Use a new condom each time you have sex. ¨ Be careful not to poke a hole in the condom when you open the wrapper. ¨ Never use petroleum jelly (such as Vaseline), grease, hand lotion, baby oil, or anything with oil in it. These products can make holes in the condom. ¨ After sex, hold the condom on your penis as you remove your penis from your partner. This will keep semen from spilling out of the condom. · Do not use a female condom and male condom at the same time. · Do not have sex with anyone who has symptoms of an STI, such as sores on the genitals or mouth. The herpes virus that causes cold sores can spread to and from the penis and vagina. · Think about getting shots to prevent hepatitis A and hepatitis B, if you haven't already had these shots. You can get both of these diseases through sex. A dental dam is a special rubber sheet that you can use for protection during oral sex. How can you prevent pregnancy? Remember that birth control methods such as diaphragms, IUDs, foams, and birth control pills do not stop you from getting STIs. These are some safer sex things you can do to help avoid pregnancy:  · Use some type of birth control every time you have sex. · Don't drink a lot of alcohol or use drugs before sex. This can cause you to let down your guard. And then you're not thinking clearly about safer sex. How else can you take care of yourself? · Talk to your partner before you have sex. Find out if he or she has or is at risk for any STI. Keep in mind that a person may be able to spread an STI even if he or she does not have symptoms. You and your partner may want to get an HIV test. You should get tested again 6 months later. · You should never feel pressured to have sex. It's okay to say \"no\" anytime you want to stop. · It's important to feel safe with your sex partner and with the activities you are doing together.  If you don't feel safe,
talk with an adult you trust.  Follow-up care is a key part of your treatment and safety. Be sure to make and go to all appointments, and call your doctor if you are having problems. It's also a good idea to know your test results and keep a list of the medicines you take. Where can you learn more? Go to http://cedric-dario.info/. Enter P218 in the search box to learn more about \"Learning About Safer Sex for Teens. \"  Current as of: March 20, 2017  Content Version: 11.4  © 9013-8240 [employer]. Care instructions adapted under license by [employer] (which disclaims liability or warranty for this information). If you have questions about a medical condition or this instruction, always ask your healthcare professional. [address] any warranty or liability for your use of this information.
26-Jul-2021 16:29

## 2021-07-26 NOTE — CONSULT NOTE ADULT - ATTENDING COMMENTS
89 year old woman with history of advanced dementia and heart failure  Palliative care consulted for GOC  Patient seen at bedside  She was stating she was cold, but was unable to participate further in exam  Spoke with patient's niece Argelia as above  Palliative care introduced  Argelia had been speaking with individuals at the patient's NH regarding GOC and hospice  She is interested in a [facility] hospice consult for informational purposes    For now, patient DNR/DNI with ongoing limited medical intervention  Palliative care will continue to follow

## 2021-07-26 NOTE — CONSULT NOTE ADULT - PROBLEM SELECTOR RECOMMENDATION 2
Recommend non-pharmacological interventions to prevent/treat delirium  - maintain day/night light cycles  - optimize sleep-wake cycle, minimize environmental noise  - reorientation frequently  - use verbal redirection as first line  - minimize restraints and lines  - ensure good bladder/bowel function  - ensure adequate pain control  - minimize use of anticholinergic, antihistaminic, and benzodiazepine medications.

## 2021-07-26 NOTE — CONSULT NOTE ADULT - CONVERSATION DETAILS
nelly Stout able to teach back  stated that Jacy AVALOS from [facility] had introduced Palliative/Hospice a few hours ago and was linking to [facility] as that is the agency with coverage in the [facility].   All questions answered.   REached out to Jacy to confirm. Will put in consult on our end  Left message for Rolando  nelly Stout able to teach back  stated that Jacy AVALOS from [facility] had introduced Palliative/Hospice a few hours ago and was linking to [facility] as that is the agency with coverage in the [facility].   All questions answered.   Reached out to Jacy to confirm. Will put in hospice consult on our end  Left message for Rolando

## 2021-07-26 NOTE — CONSULT NOTE ADULT - SUBJECTIVE AND OBJECTIVE BOX
Date of Admission: 7/24/21    HISTORY OF PRESENT ILLNESS:   89F with PMH of dementia (AAOx0), HFrEF (EF 43%), HTN, HLD, Afib (on eliquis), GERD, liver mass, DVT, COVID-19 infection 4/2020 presents from residence at [facility] for shortness of breath. Patient cannot provide any history, it was obtained from her nephew at bedside. As per him her spo2 was low in the residence home this morning when she was assessed. She had multiple hospitalizations for heart failure exacerbation. She is still able to lie flat and denied any other associated symptoms.    In the ED CXR showed bilateral opacities and pleural effusions.        PAST MEDICAL & SURGICAL HISTORY:  HTN (hypertension)    High cholesterol    Dementia    No significant past surgical history    FAMILY HISTORY:    Patient is confused unable to obtain information    SOCIAL HISTORY:      Patient is confused unable to obtain information    Allergies    No Known Allergies    Intolerances      REVIEW OF SYSTEMS:    Patient is confused, unable to obtain information.    PHYSICAL EXAM:    General Appearance: well appearing, normal for age and gender. 	  Neck: normal JVP, no bruit.   Eyes: Extra Ocular muscles intact.   Cardiovascular: regular rate and rhythm S1 S2, No JVD, No edema  Respiratory: Lungs clear to auscultation	  Psychiatry: Confused  Gastrointestinal:  Soft, Non-tender  Skin/Integumen: No rashes, No ecchymoses, No cyanosis	  Neurologic: Non-focal  Musculoskeletal/ extremities: Normal range of motion, No clubbing, cyanosis or edema  Vascular: Peripheral pulses palpable 2+ bilaterally      PREVIOUS DIAGNOSTIC TESTING:      TTE 4/17/21    Summary:   1. Severely enlarged left atrium.   2. Entire anterior septum, apical inferior segment, and apex are abnormal as described above.   3.LV Ejection Fraction by Hernandez's Method with a biplane EF of 43 %.   4. Mildly increased LV wall thickness.   5. Right atrial enlargement.   6. Mild to moderate mitral valve regurgitation.   7. Mitral annular calcification.   8. Thickening and calcification of the anterior and posterior mitral valve leaflets.   9. Mild tricuspid regurgitation.  10. Aortic valve thickening with decreased leaflet opening.  11. Mild to moderate aortic regurgitation.  12. Estimated pulmonary artery systolic pressureis 36.6 mmHg assuming a right atrial pressure of 15 mmHg, which is consistent with borderline pulmonary hypertension.  13. Peak transaortic gradient equals 17.3 mmHg, mean transaortic gradient equals 9.1 mmHg, the calculated aortic valve area equals 1.34 cm² by the continuity equation consistent with moderate aortic stenosis.      Home Medications:  Aspir 81 oral delayed release tablet: 1 tab(s) orally once a day (24 Jul 2021 16:48)  atorvastatin 10 mg oral tablet: 1 tab(s) orally once a day (24 Jul 2021 16:48)  Colace 100 mg oral capsule: 1 cap(s) orally 2 times a day (24 Jul 2021 16:48)  Eliquis 2.5 mg oral tablet: 1 tab(s) orally 2 times a day (24 Jul 2021 16:48)  hydroCHLOROthiazide 25 mg oral tablet: 1 tab(s) orally once a day (24 Jul 2021 16:48)  losartan 50 mg oral tablet: 1.5 tab(s) orally once a day (24 Jul 2021 16:48)  memantine 14 mg oral capsule, extended release: 1 cap(s) orally once a day (24 Jul 2021 16:48)  Metoprolol Tartrate 50 mg oral tablet: orally every 8 hours (24 Jul 2021 16:48)  mirtazapine 7.5 mg oral tablet: 2 tab(s) orally once a day (at bedtime) (24 Jul 2021 16:48)  pantoprazole 40 mg oral delayed release tablet: 1 tab(s) orally once a day (24 Jul 2021 16:48)  potassium chloride 10 mEq oral capsule, extended release: 1 cap(s) orally once a day (24 Jul 2021 16:48)  prednisoLONE acetate 1% ophthalmic suspension: 1 drop(s) to each affected eye 2 times a day (24 Jul 2021 16:48)  senna 8.6 mg oral tablet: 1 tab(s) orally once a day (at bedtime) (24 Jul 2021 16:48)    MEDICATIONS  (STANDING):  apixaban 2.5 milliGRAM(s) Oral every 12 hours  aspirin enteric coated 81 milliGRAM(s) Oral daily  atorvastatin Oral Tab/Cap - Peds 10 milliGRAM(s) Oral daily  chlorhexidine 4% Liquid 1 Application(s) Topical <User Schedule>  furosemide   Injectable 40 milliGRAM(s) IV Push daily  hydrochlorothiazide 25 milliGRAM(s) Oral daily  losartan 75 milliGRAM(s) Oral daily  memantine 10 milliGRAM(s) Oral daily  mirtazapine 15 milliGRAM(s) Oral at bedtime  pantoprazole    Tablet 40 milliGRAM(s) Oral before breakfast  prednisoLONE acetate 1% Suspension 1 Drop(s) Both EYES two times a day  senna 8.6 milliGRAM(s) Oral Tablet - Peds 1 Tablet(s) Oral at bedtime    MEDICATIONS  (PRN):  acetaminophen   Tablet .. 650 milliGRAM(s) Oral every 6 hours PRN Moderate Pain (4 - 6)

## 2021-07-26 NOTE — CONSULT NOTE ADULT - ASSESSMENT
89yFemale being evaluated for    NOT COMPLETE    MEDD (morphine equivalent daily dose):  NA      See Recs below.    Please call [phone] with questions or concerns 24/7.   We will continue to follow.    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   being evaluated for support with GOC and multiple hospitalization    MEDD (morphine equivalent daily dose):  NA      See Recs below. d/w primary team     Please call [phone] with questions or concerns 24/7.   We will continue to follow.    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 Palliative evaluating for support with GOC in the setting of dementia and multiple hospitalizations.    MEDD (morphine equivalent daily dose):  NA      See Recs below. d/w primary team     Please call [phone] with questions or concerns 24/7.   We will continue to follow.

## 2021-07-26 NOTE — CONSULT NOTE ADULT - PROBLEM SELECTOR RECOMMENDATION 4
DNR/I  will follow DNR/I  Ongoing limited medical managmenet  Hospice consult through [facility] for informational purposes  will follow

## 2021-07-26 NOTE — PROGRESS NOTE ADULT - SUBJECTIVE AND OBJECTIVE BOX
BRAYDON RAMIREZ 89y Female  MRN#: [id number]   CODE STATUS:________        SUBJECTIVE      Overnight events   No acute events overnight.                                                    ----------------------------------------------------------  OBJECTIVE  PAST MEDICAL & SURGICAL HISTORY  HTN (hypertension)    High cholesterol    Dementia    No significant past surgical history                                              -----------------------------------------------------------  ALLERGIES:  No Known Allergies                                            ------------------------------------------------------------    HOME MEDICATIONS  Home Medications:  Aspir 81 oral delayed release tablet: 1 tab(s) orally once a day (24 Jul 2021 16:48)  atorvastatin 10 mg oral tablet: 1 tab(s) orally once a day (24 Jul 2021 16:48)  Colace 100 mg oral capsule: 1 cap(s) orally 2 times a day (24 Jul 2021 16:48)  Eliquis 2.5 mg oral tablet: 1 tab(s) orally 2 times a day (24 Jul 2021 16:48)  hydroCHLOROthiazide 25 mg oral tablet: 1 tab(s) orally once a day (24 Jul 2021 16:48)  losartan 50 mg oral tablet: 1.5 tab(s) orally once a day (24 Jul 2021 16:48)  memantine 14 mg oral capsule, extended release: 1 cap(s) orally once a day (24 Jul 2021 16:48)  Metoprolol Tartrate 50 mg oral tablet: orally every 8 hours (24 Jul 2021 16:48)  mirtazapine 7.5 mg oral tablet: 2 tab(s) orally once a day (at bedtime) (24 Jul 2021 16:48)  pantoprazole 40 mg oral delayed release tablet: 1 tab(s) orally once a day (24 Jul 2021 16:48)  potassium chloride 10 mEq oral capsule, extended release: 1 cap(s) orally once a day (24 Jul 2021 16:48)  prednisoLONE acetate 1% ophthalmic suspension: 1 drop(s) to each affected eye 2 times a day (24 Jul 2021 16:48)  senna 8.6 mg oral tablet: 1 tab(s) orally once a day (at bedtime) (24 Jul 2021 16:48)                           MEDICATIONS:  STANDING MEDICATIONS  apixaban 2.5 milliGRAM(s) Oral every 12 hours  aspirin enteric coated 81 milliGRAM(s) Oral daily  atorvastatin Oral Tab/Cap - Peds 10 milliGRAM(s) Oral daily  chlorhexidine 4% Liquid 1 Application(s) Topical <User Schedule>  furosemide   Injectable 40 milliGRAM(s) IV Push daily  hydrochlorothiazide 25 milliGRAM(s) Oral daily  losartan 75 milliGRAM(s) Oral daily  memantine 10 milliGRAM(s) Oral daily  mirtazapine 15 milliGRAM(s) Oral at bedtime  pantoprazole    Tablet 40 milliGRAM(s) Oral before breakfast  prednisoLONE acetate 1% Suspension 1 Drop(s) Both EYES two times a day  senna 8.6 milliGRAM(s) Oral Tablet - Peds 1 Tablet(s) Oral at bedtime    PRN MEDICATIONS  acetaminophen   Tablet .. 650 milliGRAM(s) Oral every 6 hours PRN                                            ------------------------------------------------------------  VITAL SIGNS: Last 24 Hours  T(C): 34.8 (26 Jul 2021 12:58), Max: 36.4 (26 Jul 2021 05:10)  T(F): 94.7 (26 Jul 2021 12:58), Max: 97.5 (26 Jul 2021 05:10)  HR: 58 (26 Jul 2021 12:58) (58 - 78)  BP: 133/82 (26 Jul 2021 12:58) (130/59 - 133/93)  BP(mean): --  RR: 18 (26 Jul 2021 12:58) (18 - 18)  SpO2: 94% (26 Jul 2021 07:55) (94% - 94%)      07-25-21 @ 07:01  -  07-26-21 @ 07:00  --------------------------------------------------------  IN: 0 mL / OUT: 2525 mL / NET: -2525 mL    07-26-21 @ 07:01  -  07-26-21 @ 14:56  --------------------------------------------------------  IN: 780 mL / OUT: 1350 mL / NET: -570 mL                                             --------------------------------------------------------------  LABS:                        11.3   10.49 )-----------( 201 ( 25 Jul 2021 06:04 )             36.8     07-25    146  |  107  |  31<H>  ----------------------------<  95  4.0   |  22  |  1.1    Ca    9.4      25 Jul 2021 06:04    TPro  6.3  /  Alb  3.8  /  TBili  1.1  /  DBili  x   /  AST  18  /  ALT  18  /  AlkPhos  236<H>  07-25            EXAM:  XR CHEST PORTABLE IMMED 1V            PROCEDURE DATE:  07/26/2021            INTERPRETATION:  CLINICAL INDICATION:  Shortness of breath    COMPARISON: Chest radiograph dated 7/24/2021    TECHNIQUE: Frontal radiograph the chest.    FINDINGS:    Support devices: None.    Cardiac/mediastinum/hilum: Stable.    Lung parenchyma/Pleura: Decreased bilateral opacities/effusions. There is no pneumothorax.    Skeleton/soft tissues: Stable.    IMPRESSION:    Decreased bilateral opacities/effusions.    --- End of Report ---        PHYSICAL EXAM:  GEN: No acute distress, moaning   LUNGS: Clear to auscultation bilaterally   HEART: S1/S2 present. RRR.   ABD: Soft, non-tender, non-distended. Bowel sounds present  EXT: NC/NC/NE/2+PP/HALLMAN  NEURO: AAOX1  SKIN intact         Assessment and Plan:   	  89 yr old female with hx of Alz dementia (AAOx1 at baseline), HFrEF (EF 43%), HTN, HLD, Afib (on Eliquis GERD, liver mass, DVT, COVID-19 infection 4/2020 presents from residence at [facility] for sob associated with worsening swelling of both ankles for 2 days.     # Acute on Chronic HFrEF  - saturating well on RA   - CXR showed b/l interstitial opacities and pleural effusions  - BNP 21 K  - EF 43 in 4/21  - c/w Lasix 40 mg iv daily   - Fluid restriction, daily weight, strict I/Os   - c/s cardiology ( Dr Harper) - as per family request   - d/c tele   - Pending Palliative consult  - Pending HF consult    # Chronic Afib  - rate controlled   - c/w Eliquis and metoprolol    # Dementia  - AAO*1 at baseline, requires assistance with ADLs  - bedbound at baseline  - c/w memantine  - Palliative consult     # HTN  - c/w losartan and metoprolol    #hypomagnesemia  - Replete Mg    # DLD  - c/w statins    # Hx of DVT  # DVT Ppx  - on Eliquis     # PPI ppx: pantoprazole    # Funcitonal Quadriplegia     # DIET: DASH, fluid restriction    # DISPO: From [facility] residence home    # DNR / DNI

## 2021-07-26 NOTE — CONSULT NOTE ADULT - PROBLEM SELECTOR RECOMMENDATION 9
continue current medical plan with limited medical interventions (no artificial feeds, BiPap) -continue current medical plan with limited medical interventions (no artificial feeds, BiPap)  -hospice consult placed

## 2021-07-26 NOTE — CONSULT NOTE ADULT - TREATMENT GUIDELINE COMMENT
continue plan with limited medical intervention continue plan with limited medical intervention  hospice consult in on our end

## 2021-07-26 NOTE — CONSULT NOTE ADULT - SUBJECTIVE AND OBJECTIVE BOX
BRAYDON RAMIREZ          MRN-[id number]              HPI:  89F with PMH of dementia (AAOx0), HFrEF (EF 43%), HTN, HLD, Afib (on eliquis), GERD, liver mass, DVT, COVID-19 infection 2020 presents from residence at [facility] for shortness of breath. Patient cannot provide any history, it was obtained from her nephew at bedside. As per him her spo2 was low in the residence home this morning when she was assessed. She had multiple hospitalizations for heart failure exacerbation. She is still able to lie flat and denied any other associated symptoms.    In the ED VS showed tachycardia .  CXR showed bilateral opacities and pleural effusions.  she received 40 mg of iv lasix. (2021 16:39)      PAST MEDICAL & SURGICAL HISTORY:  HTN (hypertension)    High cholesterol    Dementia    No significant past surgical history        FAMILY HISTORY:   Reviewed and found non contributory in mother or father    SOCIAL HISTORY:   Tobacco/etoh/illicit drug use use reported. Yes [ ]  _________  No [ ]  Pt resides at: home [ ]  facility [ ]  other [ ] _______        ROS:	    Unable to attain due to:  NOT OCMPLETE                    Dyspnea (Honey 0-10): 0                       N/V (Y/N): No                             Secretions (Y/N) : No                                          Agitation(Y/N): No                              Pain (Y/N): No                                 -Provocation/Palliation: N/A  -Quality/Quantity: N/A  -Radiating: N/A  -Severity: No pain  -Timing/Frequency: N/A  -Impact on ADLs: N/A    General:  Denied  HEENT:    Denied  Neck:  Denied  CVS:  Denied  Resp:  Denied  GI:  Denied    :  Denied  Musc:  Denied  Neuro:  Denied  Psych:  Denied  Skin:  Denied  Lymph:  Denied    Last BM:      Allergies    No Known Allergies    Intolerances      Opiate Naive (Y/N): y  -iStop reviewed (Y/N): y  Ref#:          Reference #: [id number]        Labs:	    CBC:                        11.3   10.49 )-----------( 201      ( 2021 06:04 )             36.8     CMP:        146  |  107  |  31<H>  ----------------------------<  95  4.0   |  22  |  1.1  GFR 50-74    Ca    9.4      2021 06:04    TPro  6.3  /  Alb  3.8  /  TBili  1.1  /  DBili  x   /  AST  18  /  ALT  18  /  AlkPhos  236<H>                    Radiology:	  < from: Xray Chest 1 View AP/PA (21 @ 13:00) >    EXAM:  XR CHEST 1 VIEW            PROCEDURE DATE:  2021            INTERPRETATION:  Clinical History / Reason for exam: Chest pain    Comparison : Chest radiograph 2021.    Technique/Positioning: Single frontal chest radiograph.    Findings:    Support devices: None.    Cardiac/mediastinum/hilum: Obscured. Atherosclerotic calculations.    Lung parenchyma/Pleura: Diffuse bilateral pulmonary opacities and effusions greater on the right. No pneumothorax.    Skeleton/soft tissues: Stable    Impression:    Diffuse bilateral pulmonary opacities and effusions greater on the right.    --- End of Report ---              JAYLIN FERRER MD; Attending Radiologist  This document has been electronically signed. 2021  4:17PM    < end of copied text >         EK Lead ECG:   Ventricular Rate 119 BPM    Atrial Rate 122 BPM    QRS Duration 140 ms    Q-T Interval 340 ms    QTC Calculation(Bazett) 478 ms    R Axis 48 degrees    T Axis 239 degrees    Diagnosis Line Atrial fibrillation with rapid ventricular response  Non-specific intra-ventricular conduction delay  Abnormal ECG    Confirmed by Yisel Albright MD ([id number]) on 2021 7:19:26 PM (21 @ 15:04)      Imaging Personally Reviewed:  [ ] YES  [ ] NO    Consultant(s) Notes Reviewed:  [ ] YES  [ ] NO  Care Discussed with Consultants/Other Providers [ ] YES  [ ] NO    PEx:	  T(C): 36.4 (21 @ 05:10), Max: 36.4 (21 @ 05:10)  HR: 78 (21 @ 05:10) (70 - 78)  BP: 133/93 (21 @ 05:10) (130/59 - 152/72)  RR: 18 (21 @ 06:21) (18 - 18)  SpO2: 94% (21 @ 07:55) (94% - 94%)  Wt(kg): --  Daily     Daily Weight in k.4 (2021 05:10)    NOT COMPLETE  General:  found in bed in NAD  Eyes:  PERRL EOMI Non icteric MOM  ENMT: no external oral ulcers, MMM, no thrush   CVS: RR S1S2 No M/G/R  Resp: Unlabored Non tachypneic No increased WOB  GI:  Soft NT ND BS+  :  Voiding / Bang / PrimaFit  Musc: No C/C/E    Neuro: Follows commands No focal deficits  Psych: Calm Pleasant, AAOx3    Skin: Non jaundiced , no rash   Lymph: no adenopathy     Preadmit Karnofsky:  %           Current Karnofsky:     %  http://www.npcrc.org/files/news/karnofsky_performance_scale.pdf   http://www.npcrc.org/files/news/palliative_performance_scale_PPSv2.pdf  Cachexia (Y/N):   BMI:      Medications:	      MEDICATIONS  (STANDING):  apixaban 2.5 milliGRAM(s) Oral every 12 hours  aspirin enteric coated 81 milliGRAM(s) Oral daily  atorvastatin Oral Tab/Cap - Peds 10 milliGRAM(s) Oral daily  chlorhexidine 4% Liquid 1 Application(s) Topical <User Schedule>  furosemide   Injectable 40 milliGRAM(s) IV Push daily  hydrochlorothiazide 25 milliGRAM(s) Oral daily  losartan 75 milliGRAM(s) Oral daily  memantine 10 milliGRAM(s) Oral daily  mirtazapine 15 milliGRAM(s) Oral at bedtime  pantoprazole    Tablet 40 milliGRAM(s) Oral before breakfast  prednisoLONE acetate 1% Suspension 1 Drop(s) Both EYES two times a day  senna 8.6 milliGRAM(s) Oral Tablet - Peds 1 Tablet(s) Oral at bedtime    MEDICATIONS  (PRN):  acetaminophen   Tablet .. 650 milliGRAM(s) Oral every 6 hours PRN Moderate Pain (4 - 6)        Advanced Directives:	     Full Code     DNR/DNI     MOLST     HCP     Living Will     Decision maker: The patient is able to participate in complex medical decision making conversations.   Legal surrogate:    GOALS OF CARE DISCUSSION	       Palliative care info/counseling provided	           Family meeting scheduled         Documentation of GOC/Advanced Care Planning:       See previous Palliative Medicine Note    PSYCHOSOCIAL-SPIRITUAL ASSESSMENT:       Reviewed       See Palliative Care SW/ documentation        	    REFERRALS       Palliative Med        Unit SW/Case Mgmt              Hospice       Speech/Swallow       Nutrition       PT/OT BRAYDON RAMIREZ          MRN-[id number]              HPI:  89F with PMH of dementia (AAOx0), HFrEF (EF 43%), HTN, HLD, Afib (on eliquis), GERD, liver mass, DVT, COVID-19 infection 2020 presents from residence at [facility] for shortness of breath. Patient cannot provide any history, it was obtained from her nephew at bedside. As per him her spo2 was low in the residence home this morning when she was assessed. She had multiple hospitalizations for heart failure exacerbation. She is still able to lie flat and denied any other associated symptoms.    In the ED VS showed tachycardia .  CXR showed bilateral opacities and pleural effusions.  she received 40 mg of iv lasix. (2021 16:39)      PAST MEDICAL & SURGICAL HISTORY:  HTN (hypertension)    High cholesterol    Dementia    No significant past surgical history        FAMILY HISTORY:   Reviewed and found non contributory in mother or father    SOCIAL HISTORY:   Tobacco/etoh/illicit drug use use reported. Yes [ ]  _________  No [ x]  Pt resides at: home [x ]  facility [ ]  other [ ] _______        ROS:	    Unable to attain due to dementia; nonverbal during encounter                    Dyspnea (Honey 0-10): 0                       N/V (Y/N): No                             Secretions (Y/N) : No                                          Agitation(Y/N): minimal; returns to resting state                              Pain (Y/N): No                                 -Provocation/Palliation: N/A  -Quality/Quantity: N/A  -Radiating: N/A  -Severity: No pain  -Timing/Frequency: N/A  -Impact on ADLs: N/A    Last BM:  EMR      Allergies    No Known Allergies    Intolerances      Opiate Naive (Y/N): y  -iStop reviewed (Y/N): y  Ref#:          Reference #: [id number]        Labs:	    CBC:                        11.3   10.49 )-----------( 201      ( 2021 06:04 )             36.8     CMP:        146  |  107  |  31<H>  ----------------------------<  95  4.0   |  22  |  1.1  GFR 50-74    Ca    9.4      2021 06:04    TPro  6.3  /  Alb  3.8  /  TBili  1.1  /  DBili  x   /  AST  18  /  ALT  18  /  AlkPhos  236<H>                    Radiology:	  < from: Xray Chest 1 View AP/PA (21 @ 13:00) >    EXAM:  XR CHEST 1 VIEW            PROCEDURE DATE:  2021            INTERPRETATION:  Clinical History / Reason for exam: Chest pain    Comparison : Chest radiograph 2021.    Technique/Positioning: Single frontal chest radiograph.    Findings:    Support devices: None.    Cardiac/mediastinum/hilum: Obscured. Atherosclerotic calculations.    Lung parenchyma/Pleura: Diffuse bilateral pulmonary opacities and effusions greater on the right. No pneumothorax.    Skeleton/soft tissues: Stable    Impression:    Diffuse bilateral pulmonary opacities and effusions greater on the right.    --- End of Report ---              JAYLIN FERRER MD; Attending Radiologist  This document has been electronically signed. 2021  4:17PM    < end of copied text >         EK Lead ECG:   Ventricular Rate 119 BPM    Atrial Rate 122 BPM    QRS Duration 140 ms    Q-T Interval 340 ms    QTC Calculation(Bazett) 478 ms    R Axis 48 degrees    T Axis 239 degrees    Diagnosis Line Atrial fibrillation with rapid ventricular response  Non-specific intra-ventricular conduction delay  Abnormal ECG    Confirmed by Yisel Albright MD ([id number]) on 2021 7:19:26 PM (21 @ 15:04)      Imaging Personally Reviewed:  [ ] YES  [ ] NO    Consultant(s) Notes Reviewed:  [ ] YES  [ ] NO  Care Discussed with Consultants/Other Providers [ ] YES  [ ] NO    PEx:	  T(C): 36.4 (21 @ 05:10), Max: 36.4 (21 @ 05:10)  HR: 78 (21 @ 05:10) (70 - 78)  BP: 133/93 (21 @ 05:10) (130/59 - 152/72)  RR: 18 (21 @ 06:21) (18 - 18)  SpO2: 94% (21 @ 07:55) (94% - 94%)  Wt(kg): --  Daily     Daily Weight in k.4 (2021 05:10)    General:  found in bed in NAD; had IV dislodge; RN at bedside  Eyes:  does not open eyes  ENMT: no external oral ulcers  CVS: +2 irreg  Resp: Unlabored Non tachypneic No increased WOB  GI:  Soft NT ND   :  PrimaFit  Musc: No C/C/E warm  Neuro: not following commands No focal deficits  Psych: Calm Pleasant, AAOx0  Skin: Non jaundiced ,     Preadmit Karnofsky:  %           Current Karnofsky:  30  %  http://www.npcrc.org/files/news/karnofsky_performance_scale.pdf   http://www.npcrc.org/files/news/palliative_performance_scale_PPSv2.pdf  Cachexia (Y/N): n  BMI: not charted      Medications:	      MEDICATIONS  (STANDING):  apixaban 2.5 milliGRAM(s) Oral every 12 hours  aspirin enteric coated 81 milliGRAM(s) Oral daily  atorvastatin Oral Tab/Cap - Peds 10 milliGRAM(s) Oral daily  chlorhexidine 4% Liquid 1 Application(s) Topical <User Schedule>  furosemide   Injectable 40 milliGRAM(s) IV Push daily  hydrochlorothiazide 25 milliGRAM(s) Oral daily  losartan 75 milliGRAM(s) Oral daily  memantine 10 milliGRAM(s) Oral daily  mirtazapine 15 milliGRAM(s) Oral at bedtime  pantoprazole    Tablet 40 milliGRAM(s) Oral before breakfast  prednisoLONE acetate 1% Suspension 1 Drop(s) Both EYES two times a day  senna 8.6 milliGRAM(s) Oral Tablet - Peds 1 Tablet(s) Oral at bedtime    MEDICATIONS  (PRN):  acetaminophen   Tablet .. 650 milliGRAM(s) Oral every 6 hours PRN Moderate Pain (4 - 6)        Advanced Directives:	       DNR/DNI         Decision maker: The patient is unable to participate in complex medical decision making conversations.   Legal surrogate: niece Argelia    GOALS OF CARE DISCUSSION	       Palliative care info/counseling provided	              PSYCHOSOCIAL-SPIRITUAL ASSESSMENT:              See Palliative Care SW/ documentation        	    REFERRALS       Palliative Med        Unit SW/Case Mgmt              Hospice     BRAYDON RAMIREZ          MRN-[id number]              CC: SOB    HPI:  89F with PMH of dementia (AAOx0), HFrEF (EF 43%), HTN, HLD, Afib (on eliquis), GERD, liver mass, DVT, COVID-19 infection 2020 presents from residence at [facility] for shortness of breath. Patient cannot provide any history, it was obtained from her nephew at bedside. As per him her spo2 was low in the residence home this morning when she was assessed. She had multiple hospitalizations for heart failure exacerbation. She is still able to lie flat and denied any other associated symptoms.    In the ED VS showed tachycardia .  CXR showed bilateral opacities and pleural effusions.  she received 40 mg of iv lasix. (2021 16:39)      PAST MEDICAL & SURGICAL HISTORY:  HTN (hypertension)    High cholesterol    Dementia    No significant past surgical history        FAMILY HISTORY:   Reviewed and found non contributory in mother or father    SOCIAL HISTORY:   Tobacco/etoh/illicit drug use use reported. Yes [ ]  _________  No [ x]  Pt resides at: home [x ]  facility [ ]  other [ ] _______        ROS:	    Unable to attain due to dementia; nonverbal during encounter                    Last BM:  EMR    Allergies  No Known Allergies    Opiate Naive (Y/N): y  -iStop reviewed (Y/N): y  Ref#:          Reference #: [id number] - No meds        Labs:	    CBC:                        11.3   10.49 )-----------( 201      ( 2021 06:04 )             36.8     CMP:        146  |  107  |  31<H>  ----------------------------<  95  4.0   |  22  |  1.1  GFR 50-74    Ca    9.4      2021 06:04    TPro  6.3  /  Alb  3.8  /  TBili  1.1  /  DBili  x   /  AST  18  /  ALT  18  /  AlkPhos  236<H>           Radiology:	  < from: Xray Chest 1 View AP/PA (21 @ 13:00) >    EXAM:  XR CHEST 1 VIEW            PROCEDURE DATE:  2021            INTERPRETATION:  Clinical History / Reason for exam: Chest pain    Comparison : Chest radiograph 2021.    Technique/Positioning: Single frontal chest radiograph.    Findings:    Support devices: None.    Cardiac/mediastinum/hilum: Obscured. Atherosclerotic calculations.    Lung parenchyma/Pleura: Diffuse bilateral pulmonary opacities and effusions greater on the right. No pneumothorax.    Skeleton/soft tissues: Stable    Impression:    Diffuse bilateral pulmonary opacities and effusions greater on the right.    --- End of Report ---           EKG:	  interpreted by me: afib with rvr      Imaging Personally Reviewed:  [x] YES  [ ] NO    Consultant(s) Notes Reviewed:  [x] YES  [ ] NO  Care Discussed with Consultants/Other Providers [x] YES  [ ] NO    PEx:	  T(C): 36.4 (21 @ 05:10), Max: 36.4 (21 @ 05:10)  HR: 78 (21 @ 05:10) (70 - 78)  BP: 133/93 (21 @ 05:10) (130/59 - 152/72)  RR: 18 (21 @ 06:21) (18 - 18)  SpO2: 94% (21 @ 07:55) (94% - 94%)  Wt(kg): --  Daily     Daily Weight in k.4 (2021 05:10)    General:  found in bed in NAD; had IV dislodge; RN at bedside  Eyes:  does not open eyes  ENMT: no external oral ulcers  CVS: +2 irreg  Resp: Unlabored Non tachypneic No increased WOB  GI:  Soft NT ND   :  PrimaFit  Musc: No C/C/E warm  Neuro: not following commands No focal deficits  Psych: Calm Pleasant, AAOx0  Skin: Non jaundiced ,     Preadmit Karnofsky:  Unknown %           Current Karnofsky:  30  %    Cachexia (Y/N): n  BMI: not charted      Medications:	      MEDICATIONS  (STANDING):  apixaban 2.5 milliGRAM(s) Oral every 12 hours  aspirin enteric coated 81 milliGRAM(s) Oral daily  atorvastatin Oral Tab/Cap - Peds 10 milliGRAM(s) Oral daily  chlorhexidine 4% Liquid 1 Application(s) Topical <User Schedule>  furosemide   Injectable 40 milliGRAM(s) IV Push daily  hydrochlorothiazide 25 milliGRAM(s) Oral daily  losartan 75 milliGRAM(s) Oral daily  memantine 10 milliGRAM(s) Oral daily  mirtazapine 15 milliGRAM(s) Oral at bedtime  pantoprazole    Tablet 40 milliGRAM(s) Oral before breakfast  prednisoLONE acetate 1% Suspension 1 Drop(s) Both EYES two times a day  senna 8.6 milliGRAM(s) Oral Tablet - Peds 1 Tablet(s) Oral at bedtime    MEDICATIONS  (PRN):  acetaminophen   Tablet .. 650 milliGRAM(s) Oral every 6 hours PRN Moderate Pain (4 - 6)        Advanced Directives:	       DNR/DNI         Decision maker: The patient is unable to participate in complex medical decision making conversations.   Legal surrogate: nelly Stout    GOALS OF CARE DISCUSSION	       Palliative care info/counseling provided	              PSYCHOSOCIAL-SPIRITUAL ASSESSMENT:              See Palliative Care SW/ documentation        	    REFERRALS       Palliative Med        Unit SW/Case Mgmt

## 2021-07-26 NOTE — CONSULT NOTE ADULT - ASSESSMENT
Acute on Chronic HFmEF LVEF 43%    Patient is  fluid overloaded on exam  Get BMP daily   Maintain potassium >4.0, Mg >2.1  Strict intake and output  Daily weight   Obtain Iron profile   Acute on Chronic HFmEF LVEF 43% / Dementia / AMS     Patient confused, AAO x0, fluid overloaded   Continue furosemide 40 mg iv BID  Get BMP daily   Maintain potassium >4.0, Mg >2.1  Strict intake and output  Daily weight   Obtain Iron profile  Patient is DNR/DNI   Get palliative care evaluation

## 2021-07-27 LAB
ALBUMIN SERPL ELPH-MCNC: 3.6 G/DL — SIGNIFICANT CHANGE UP (ref 3.5–5.2)
ALP SERPL-CCNC: 194 U/L — HIGH (ref 30–115)
ALT FLD-CCNC: 13 U/L — SIGNIFICANT CHANGE UP (ref 0–41)
ANION GAP SERPL CALC-SCNC: 9 MMOL/L — SIGNIFICANT CHANGE UP (ref 7–14)
AST SERPL-CCNC: 14 U/L — SIGNIFICANT CHANGE UP (ref 0–41)
BASOPHILS # BLD AUTO: 0.02 K/UL — SIGNIFICANT CHANGE UP (ref 0–0.2)
BASOPHILS NFR BLD AUTO: 0.2 % — SIGNIFICANT CHANGE UP (ref 0–1)
BILIRUB SERPL-MCNC: 1.1 MG/DL — SIGNIFICANT CHANGE UP (ref 0.2–1.2)
BUN SERPL-MCNC: 25 MG/DL — HIGH (ref 10–20)
CALCIUM SERPL-MCNC: 9 MG/DL — SIGNIFICANT CHANGE UP (ref 8.5–10.1)
CHLORIDE SERPL-SCNC: 98 MMOL/L — SIGNIFICANT CHANGE UP (ref 98–110)
CO2 SERPL-SCNC: 37 MMOL/L — HIGH (ref 17–32)
CREAT SERPL-MCNC: 1.2 MG/DL — SIGNIFICANT CHANGE UP (ref 0.7–1.5)
EOSINOPHIL # BLD AUTO: 0.19 K/UL — SIGNIFICANT CHANGE UP (ref 0–0.7)
EOSINOPHIL NFR BLD AUTO: 1.7 % — SIGNIFICANT CHANGE UP (ref 0–8)
GLUCOSE SERPL-MCNC: 94 MG/DL — SIGNIFICANT CHANGE UP (ref 70–99)
HCT VFR BLD CALC: 38.9 % — SIGNIFICANT CHANGE UP (ref 37–47)
HGB BLD-MCNC: 11.9 G/DL — LOW (ref 12–16)
IMM GRANULOCYTES NFR BLD AUTO: 0.4 % — HIGH (ref 0.1–0.3)
LYMPHOCYTES # BLD AUTO: 1.34 K/UL — SIGNIFICANT CHANGE UP (ref 1.2–3.4)
LYMPHOCYTES # BLD AUTO: 11.9 % — LOW (ref 20.5–51.1)
MAGNESIUM SERPL-MCNC: 1.7 MG/DL — LOW (ref 1.8–2.4)
MCHC RBC-ENTMCNC: 25.8 PG — LOW (ref 27–31)
MCHC RBC-ENTMCNC: 30.6 G/DL — LOW (ref 32–37)
MCV RBC AUTO: 84.2 FL — SIGNIFICANT CHANGE UP (ref 81–99)
MONOCYTES # BLD AUTO: 0.73 K/UL — HIGH (ref 0.1–0.6)
MONOCYTES NFR BLD AUTO: 6.5 % — SIGNIFICANT CHANGE UP (ref 1.7–9.3)
NEUTROPHILS # BLD AUTO: 8.9 K/UL — HIGH (ref 1.4–6.5)
NEUTROPHILS NFR BLD AUTO: 79.3 % — HIGH (ref 42.2–75.2)
NRBC # BLD: 0 /100 WBCS — SIGNIFICANT CHANGE UP (ref 0–0)
PLATELET # BLD AUTO: 178 K/UL — SIGNIFICANT CHANGE UP (ref 130–400)
POTASSIUM SERPL-MCNC: 2.9 MMOL/L — LOW (ref 3.5–5)
POTASSIUM SERPL-SCNC: 2.9 MMOL/L — LOW (ref 3.5–5)
PROT SERPL-MCNC: 6 G/DL — SIGNIFICANT CHANGE UP (ref 6–8)
RBC # BLD: 4.62 M/UL — SIGNIFICANT CHANGE UP (ref 4.2–5.4)
RBC # FLD: 19.8 % — HIGH (ref 11.5–14.5)
SODIUM SERPL-SCNC: 144 MMOL/L — SIGNIFICANT CHANGE UP (ref 135–146)
TROPONIN T SERPL-MCNC: <0.01 NG/ML — SIGNIFICANT CHANGE UP
WBC # BLD: 11.23 K/UL — HIGH (ref 4.8–10.8)
WBC # FLD AUTO: 11.23 K/UL — HIGH (ref 4.8–10.8)

## 2021-07-27 PROCEDURE — 71045 X-RAY EXAM CHEST 1 VIEW: CPT | Mod: 26

## 2021-07-27 PROCEDURE — 99233 SBSQ HOSP IP/OBS HIGH 50: CPT

## 2021-07-27 RX ORDER — MAGNESIUM SULFATE 500 MG/ML
2 VIAL (ML) INJECTION ONCE
Refills: 0 | Status: COMPLETED | OUTPATIENT
Start: 2021-07-27 | End: 2021-07-27

## 2021-07-27 RX ORDER — POTASSIUM CHLORIDE 20 MEQ
20 PACKET (EA) ORAL
Refills: 0 | Status: COMPLETED | OUTPATIENT
Start: 2021-07-27 | End: 2021-07-27

## 2021-07-27 RX ORDER — POTASSIUM CHLORIDE 20 MEQ
20 PACKET (EA) ORAL ONCE
Refills: 0 | Status: COMPLETED | OUTPATIENT
Start: 2021-07-27 | End: 2021-07-27

## 2021-07-27 RX ADMIN — Medication 25 MILLIGRAM(S): at 05:17

## 2021-07-27 RX ADMIN — MIRTAZAPINE 15 MILLIGRAM(S): 45 TABLET, ORALLY DISINTEGRATING ORAL at 21:47

## 2021-07-27 RX ADMIN — Medication 20 MILLIEQUIVALENT(S): at 11:55

## 2021-07-27 RX ADMIN — Medication 50 MILLIEQUIVALENT(S): at 15:31

## 2021-07-27 RX ADMIN — Medication 50 MILLIEQUIVALENT(S): at 17:26

## 2021-07-27 RX ADMIN — LOSARTAN POTASSIUM 75 MILLIGRAM(S): 100 TABLET, FILM COATED ORAL at 05:17

## 2021-07-27 RX ADMIN — APIXABAN 2.5 MILLIGRAM(S): 2.5 TABLET, FILM COATED ORAL at 05:16

## 2021-07-27 RX ADMIN — Medication 50 MILLIEQUIVALENT(S): at 10:09

## 2021-07-27 RX ADMIN — Medication 1 DROP(S): at 17:26

## 2021-07-27 RX ADMIN — ATORVASTATIN CALCIUM 10 MILLIGRAM(S): 80 TABLET, FILM COATED ORAL at 11:55

## 2021-07-27 RX ADMIN — CHLORHEXIDINE GLUCONATE 1 APPLICATION(S): 213 SOLUTION TOPICAL at 05:18

## 2021-07-27 RX ADMIN — Medication 1 DROP(S): at 05:16

## 2021-07-27 RX ADMIN — Medication 40 MILLIGRAM(S): at 05:18

## 2021-07-27 RX ADMIN — MEMANTINE HYDROCHLORIDE 10 MILLIGRAM(S): 10 TABLET ORAL at 11:54

## 2021-07-27 RX ADMIN — Medication 50 GRAM(S): at 09:11

## 2021-07-27 RX ADMIN — APIXABAN 2.5 MILLIGRAM(S): 2.5 TABLET, FILM COATED ORAL at 17:26

## 2021-07-27 RX ADMIN — Medication 81 MILLIGRAM(S): at 11:55

## 2021-07-27 NOTE — CHART NOTE - NSCHARTNOTEFT_GEN_A_CORE
PALLIATIVE MEDICINE INTERDISCIPLINARY TEAM NOTE    Provider:  [ x  ]Social Work   [   ]          [ x  ] Initial visit [   ] Follow up    Family or contact name / phone #   Met with: [x   ] Patient  [   ] Family T/C to Argelia villegas  [   ] Other:    Primary Language: [   ] English [   ] Other*:                      *Interpretation provided by:    SUPPORT DIAGNOSES            (Check all that apply)  [   ] Psychosocial spiritual assessment (PSSA)  [ x  ] EOL issues  [   ] Cultural / spiritual concerns  [   ] Pain / suffering  [   ] Dementia / AMS  [   ] Other:  [   ] AD issues  [   ] Grief / loss / sadness  [   x] Discharge issues  [   ] Distress / coping    PSYCHOSOCIAL ASSESSMENT OF PATIENT         (Check all that apply)  [   x] Initial Assessment            [   ] Reassessment          [   ] Not Applicable this visit    Pain/suffering acuity:  patient appeared to be resting comfortably.  [   ] None to mild (0-3)           [   ] Moderate (4-6)        [   ] High (7-10)    Mental Status:  unable to assess  [   ] Alert/oriented (x3)          [   ] Confused/Altered(x2/x1)         [   ] Non-resp    Functional status:  [   ] Independent w ADLs      [ x  ] Needs Assistance             [   ] Bedbound/Full Care    Coping:  unable to assess  [   ] Coping well                     [   ] Coping w/difficulty            [   ] Poor coping    Support system:  [ x  ] Strong                              [   ] Adequate                        [   ] Inadequate      Past history and medications for: as per nelly    [ ] Anxiety       [ ] Depression    [ x] Sleep disorders     SPIRITUAL ASSESSMENT  Jain/Spiritual practice: ___________________________    Role of organized Druze:  [   ] Important                     [   ] Some (fam tradition, cultural)               [   ] None    Effects on medical care:  [   ] Yes, _____________________________________                         [   ] None    Cultural/Worship need:  [   ] Yes, _____________________________________                         [   ] None    Refer to Pastoral Care:  [   ] Yes           [   ] No, not at this time    SERVICE PROVIDED  [   ]PSSA                                                                             [  x ]Discharge support / facilitation  [   ]AD / goals of care counseling                                  [ x  ]EOL / death / bereavement counseling  [  x ]Counseling / support:  niece                                               [   ] Family meeting  [   ]Prayer / sacrament / ritual                                      [   ] Referral   [   ]Other                                                                       NOTE and Plan of Care (PoC):    Patient is a 89 year old female.  Patient was admitted on 7/25/21, dx:  Syncope.  Patient has PHM of Dementia, HTN, HLD, AFib, GERD, liver mass, DVT, CHF, COVID19 IN 4-2020.    Patient was observed to be resting comfortably.  T/C; to Argelia villegas:  niece reported that patient has been a resident at [facility] for six years.  Niece spoke to [facility].  [facility] will provide care when patient is discharged back to [facility].  Writer left a message for Tarsha Linda, Case Management regarding same.

## 2021-07-27 NOTE — PROGRESS NOTE ADULT - SUBJECTIVE AND OBJECTIVE BOX
BRAYDON RAMIREZ 89y Female  MRN#: [id number]   CODE STATUS:________      SUBJECTIVE    Patient had no acute events overnight. She is AAOx0-1 at baseline and remains unchanged.                                                 ----------------------------------------------------------  OBJECTIVE  PAST MEDICAL & SURGICAL HISTORY  HTN (hypertension)    High cholesterol    Dementia    No significant past surgical history                                              -----------------------------------------------------------  ALLERGIES:  No Known Allergies                                            ------------------------------------------------------------    HOME MEDICATIONS  Home Medications:  Aspir 81 oral delayed release tablet: 1 tab(s) orally once a day (24 Jul 2021 16:48)  atorvastatin 10 mg oral tablet: 1 tab(s) orally once a day (24 Jul 2021 16:48)  Colace 100 mg oral capsule: 1 cap(s) orally 2 times a day (24 Jul 2021 16:48)  Eliquis 2.5 mg oral tablet: 1 tab(s) orally 2 times a day (24 Jul 2021 16:48)  hydroCHLOROthiazide 25 mg oral tablet: 1 tab(s) orally once a day (24 Jul 2021 16:48)  losartan 50 mg oral tablet: 1.5 tab(s) orally once a day (24 Jul 2021 16:48)  memantine 14 mg oral capsule, extended release: 1 cap(s) orally once a day (24 Jul 2021 16:48)  Metoprolol Tartrate 50 mg oral tablet: orally every 8 hours (24 Jul 2021 16:48)  mirtazapine 7.5 mg oral tablet: 2 tab(s) orally once a day (at bedtime) (24 Jul 2021 16:48)  pantoprazole 40 mg oral delayed release tablet: 1 tab(s) orally once a day (24 Jul 2021 16:48)  potassium chloride 10 mEq oral capsule, extended release: 1 cap(s) orally once a day (24 Jul 2021 16:48)  prednisoLONE acetate 1% ophthalmic suspension: 1 drop(s) to each affected eye 2 times a day (24 Jul 2021 16:48)  senna 8.6 mg oral tablet: 1 tab(s) orally once a day (at bedtime) (24 Jul 2021 16:48)                           MEDICATIONS:  STANDING MEDICATIONS  apixaban 2.5 milliGRAM(s) Oral every 12 hours  aspirin enteric coated 81 milliGRAM(s) Oral daily  atorvastatin Oral Tab/Cap - Peds 10 milliGRAM(s) Oral daily  chlorhexidine 4% Liquid 1 Application(s) Topical <User Schedule>  furosemide   Injectable 40 milliGRAM(s) IV Push daily  hydrochlorothiazide 25 milliGRAM(s) Oral daily  losartan 75 milliGRAM(s) Oral daily  memantine 10 milliGRAM(s) Oral daily  mirtazapine 15 milliGRAM(s) Oral at bedtime  pantoprazole    Tablet 40 milliGRAM(s) Oral before breakfast  potassium chloride    Tablet ER 20 milliEquivalent(s) Oral once  potassium chloride  20 mEq/100 mL IVPB 20 milliEquivalent(s) IV Intermittent every 2 hours  prednisoLONE acetate 1% Suspension 1 Drop(s) Both EYES two times a day  senna 8.6 milliGRAM(s) Oral Tablet - Peds 1 Tablet(s) Oral at bedtime    PRN MEDICATIONS  acetaminophen   Tablet .. 650 milliGRAM(s) Oral every 6 hours PRN                                            ------------------------------------------------------------  VITAL SIGNS: Last 24 Hours  T(C): 36.4 (27 Jul 2021 04:16), Max: 36.4 (27 Jul 2021 04:16)  T(F): 97.5 (27 Jul 2021 04:16), Max: 97.5 (27 Jul 2021 04:16)  HR: 94 (27 Jul 2021 04:16) (58 - 94)  BP: 135/89 (27 Jul 2021 04:16) (133/62 - 135/89)  BP(mean): --  RR: 18 (27 Jul 2021 04:16) (18 - 18)  SpO2: 95% (26 Jul 2021 20:08) (95% - 95%)      07-26-21 @ 07:01  -  07-27-21 @ 07:00  --------------------------------------------------------  IN: 780 mL / OUT: 2400 mL / NET: -1620 mL    07-27-21 @ 07:01  -  07-27-21 @ 10:53  --------------------------------------------------------  IN: 0 mL / OUT: 750 mL / NET: -750 mL                                             --------------------------------------------------------------  LABS:                        11.9   11.23 )-----------( 178      ( 27 Jul 2021 06:47 )             38.9     07-27    144  |  98  |  25<H>  ----------------------------<  94  2.9<L>   |  37<H>  |  1.2    Ca    9.0      27 Jul 2021 06:47  Mg     1.7     07-27    TPro  6.0  /  Alb  3.6  /  TBili  1.1  /  DBili  x   /  AST  14  /  ALT  13  /  AlkPhos  194<H>  07-27          Troponin T, Serum: <0.01 ng/mL (07-27-21 @ 06:47)  Troponin T, Serum: <0.01 ng/mL (07-26-21 @ 17:39)          CARDIAC MARKERS ( 27 Jul 2021 06:47 )  x     / <0.01 ng/mL / x     / x     / x      CARDIAC MARKERS ( 26 Jul 2021 17:39 )  x     / <0.01 ng/mL / x     / x     / x                          PHYSICAL EXAM:  GEN: No acute distress, moaning   LUNGS: Clear to auscultation bilaterally   HEART: S1/S2 present. RRR.   ABD: Soft, non-tender, non-distended. 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 The likely diagnosis was a heart failure exacerbation. The patient was diuresed with lasix to treat the fluid overload and kept fluid restricted. Given the patient's poor prognosis palliative was consulted and is discussing hospice care.     # Acute on Chronic HFrEF  - saturating well on RA   - CXR showed b/l interstitial opacities and pleural effusions  - BNP 21 K  - EF 43 in 4/21  - c/w Lasix 40 mg iv daily   - Fluid restriction, daily weight, strict I/Os   - c/s cardiology ( Dr Harper) - as per family request   - d/c tele   - Palliative has seen the patient, discussing hospice care with the family  - HF rec's appreciated, will continue to diurese the patient    # Chronic Afib  - rate controlled   - Metoprolol held due to sinus pauses but asymptomatic  - c/w Eliquis     # Dementia  - AAO*1 at baseline, requires assistance with ADLs  - bedbound at baseline  - c/w memantine      # HTN  - c/w losartan and metoprolol    #hypomagnesemia  - continue to replete magnesium    # DLD  - c/w statins    # Hx of DVT  # DVT Ppx  - on Eliquis     # PPI ppx: pantoprazole    # Funcitonal Quadriplegia     # DIET: DASH, fluid restriction    # DISPO: From [facility] residence home    # DNR / DNI

## 2021-07-28 LAB
ANION GAP SERPL CALC-SCNC: 12 MMOL/L — SIGNIFICANT CHANGE UP (ref 7–14)
BASOPHILS # BLD AUTO: 0.03 K/UL — SIGNIFICANT CHANGE UP (ref 0–0.2)
BASOPHILS NFR BLD AUTO: 0.3 % — SIGNIFICANT CHANGE UP (ref 0–1)
BUN SERPL-MCNC: 23 MG/DL — HIGH (ref 10–20)
CALCIUM SERPL-MCNC: 9.5 MG/DL — SIGNIFICANT CHANGE UP (ref 8.5–10.1)
CHLORIDE SERPL-SCNC: 95 MMOL/L — LOW (ref 98–110)
CO2 SERPL-SCNC: 33 MMOL/L — HIGH (ref 17–32)
CREAT SERPL-MCNC: 1 MG/DL — SIGNIFICANT CHANGE UP (ref 0.7–1.5)
EOSINOPHIL # BLD AUTO: 0.17 K/UL — SIGNIFICANT CHANGE UP (ref 0–0.7)
EOSINOPHIL NFR BLD AUTO: 1.8 % — SIGNIFICANT CHANGE UP (ref 0–8)
FERRITIN SERPL-MCNC: 96 NG/ML — SIGNIFICANT CHANGE UP (ref 15–150)
GLUCOSE SERPL-MCNC: 80 MG/DL — SIGNIFICANT CHANGE UP (ref 70–99)
HCT VFR BLD CALC: 40.7 % — SIGNIFICANT CHANGE UP (ref 37–47)
HGB BLD-MCNC: 12.5 G/DL — SIGNIFICANT CHANGE UP (ref 12–16)
IMM GRANULOCYTES NFR BLD AUTO: 0.3 % — SIGNIFICANT CHANGE UP (ref 0.1–0.3)
IRON SATN MFR SERPL: 14 % — LOW (ref 15–50)
IRON SATN MFR SERPL: 41 UG/DL — SIGNIFICANT CHANGE UP (ref 35–150)
LYMPHOCYTES # BLD AUTO: 1.64 K/UL — SIGNIFICANT CHANGE UP (ref 1.2–3.4)
LYMPHOCYTES # BLD AUTO: 17.3 % — LOW (ref 20.5–51.1)
MAGNESIUM SERPL-MCNC: 2.1 MG/DL — SIGNIFICANT CHANGE UP (ref 1.8–2.4)
MCHC RBC-ENTMCNC: 25.8 PG — LOW (ref 27–31)
MCHC RBC-ENTMCNC: 30.7 G/DL — LOW (ref 32–37)
MCV RBC AUTO: 83.9 FL — SIGNIFICANT CHANGE UP (ref 81–99)
MONOCYTES # BLD AUTO: 0.77 K/UL — HIGH (ref 0.1–0.6)
MONOCYTES NFR BLD AUTO: 8.1 % — SIGNIFICANT CHANGE UP (ref 1.7–9.3)
NEUTROPHILS # BLD AUTO: 6.84 K/UL — HIGH (ref 1.4–6.5)
NEUTROPHILS NFR BLD AUTO: 72.2 % — SIGNIFICANT CHANGE UP (ref 42.2–75.2)
NRBC # BLD: 0 /100 WBCS — SIGNIFICANT CHANGE UP (ref 0–0)
PLATELET # BLD AUTO: 192 K/UL — SIGNIFICANT CHANGE UP (ref 130–400)
POTASSIUM SERPL-MCNC: 3.8 MMOL/L — SIGNIFICANT CHANGE UP (ref 3.5–5)
POTASSIUM SERPL-SCNC: 3.8 MMOL/L — SIGNIFICANT CHANGE UP (ref 3.5–5)
RBC # BLD: 4.85 M/UL — SIGNIFICANT CHANGE UP (ref 4.2–5.4)
RBC # FLD: 20.1 % — HIGH (ref 11.5–14.5)
SODIUM SERPL-SCNC: 140 MMOL/L — SIGNIFICANT CHANGE UP (ref 135–146)
TIBC SERPL-MCNC: 296 UG/DL — SIGNIFICANT CHANGE UP (ref 220–430)
TRANSFERRIN SERPL-MCNC: 238 MG/DL — SIGNIFICANT CHANGE UP (ref 200–360)
UIBC SERPL-MCNC: 255 UG/DL — SIGNIFICANT CHANGE UP (ref 110–370)
WBC # BLD: 9.48 K/UL — SIGNIFICANT CHANGE UP (ref 4.8–10.8)
WBC # FLD AUTO: 9.48 K/UL — SIGNIFICANT CHANGE UP (ref 4.8–10.8)

## 2021-07-28 PROCEDURE — 99233 SBSQ HOSP IP/OBS HIGH 50: CPT

## 2021-07-28 RX ADMIN — ATORVASTATIN CALCIUM 10 MILLIGRAM(S): 80 TABLET, FILM COATED ORAL at 11:39

## 2021-07-28 RX ADMIN — LOSARTAN POTASSIUM 75 MILLIGRAM(S): 100 TABLET, FILM COATED ORAL at 05:51

## 2021-07-28 RX ADMIN — PANTOPRAZOLE SODIUM 40 MILLIGRAM(S): 20 TABLET, DELAYED RELEASE ORAL at 05:51

## 2021-07-28 RX ADMIN — CHLORHEXIDINE GLUCONATE 1 APPLICATION(S): 213 SOLUTION TOPICAL at 05:51

## 2021-07-28 RX ADMIN — MIRTAZAPINE 15 MILLIGRAM(S): 45 TABLET, ORALLY DISINTEGRATING ORAL at 23:23

## 2021-07-28 RX ADMIN — Medication 1 DROP(S): at 05:50

## 2021-07-28 RX ADMIN — Medication 81 MILLIGRAM(S): at 11:40

## 2021-07-28 RX ADMIN — Medication 40 MILLIGRAM(S): at 05:51

## 2021-07-28 RX ADMIN — MEMANTINE HYDROCHLORIDE 10 MILLIGRAM(S): 10 TABLET ORAL at 11:39

## 2021-07-28 RX ADMIN — SENNA PLUS 1 TABLET(S): 8.6 TABLET ORAL at 23:23

## 2021-07-28 RX ADMIN — Medication 25 MILLIGRAM(S): at 05:51

## 2021-07-28 RX ADMIN — APIXABAN 2.5 MILLIGRAM(S): 2.5 TABLET, FILM COATED ORAL at 05:50

## 2021-07-28 RX ADMIN — Medication 1 DROP(S): at 17:52

## 2021-07-28 RX ADMIN — APIXABAN 2.5 MILLIGRAM(S): 2.5 TABLET, FILM COATED ORAL at 17:52

## 2021-07-28 NOTE — PROGRESS NOTE ADULT - SUBJECTIVE AND OBJECTIVE BOX
BRAYDON RAMIREZ             MRN-[id number]      Patient is a 89y old Female who presents with a chief complaint of shortness of breath (28 Jul 2021 10:47)    Currently admitted with the primary diagnosis of HRF exacerbation     SUBJECTIVE:    nursing states that patient moans with care, then returns to resting state    ROS:  UNABLE TO OBTAIN  due to advanced dementia    DYSPNEA: N	  NAUS/VOM:  N	  SECRETIONS: N	  AGITATION: N  Pain (Y/N):     N  -Provocation/Palliation:  -Quality/Quantity:  -Radiating:  -Severity:  -Timing/Frequency:  -Impact on ADLs:      PEx:   T(C): 35.6 (07-28-21 @ 14:08), Max: 36.8 (07-27-21 @ 20:44)  HR: 120 (07-28-21 @ 14:08) (95 - 120)  BP: 112/77 (07-28-21 @ 14:08) (112/77 - 136/76)  RR: 18 (07-28-21 @ 14:08) (18 - 20)  SpO2: 95% (07-28-21 @ 05:48) (94% - 95%)  Wt(kg): --            General:  found in bed in NAD  ENMT: no external oral ulcers,h   Resp: Unlabored Non tachypneic No increased WOB    Last BM: none in emr    ALLERGIES: No Known Allergies            Labs:	    CBC:                        12.5   9.48  )-----------( 192      ( 28 Jul 2021 07:33 )             40.7     CMP:    07-28    140  |  95<L>  |  23<H>  ----------------------------<  80  3.8   |  33<H>  |  1.0    Ca    9.5      28 Jul 2021 07:33  Mg     2.1     07-28    TPro  6.0  /  Alb  3.6  /  TBili  1.1  /  DBili  x   /  AST  14  /  ALT  13  /  AlkPhos  194<H>  07-27              EKG  12 Lead ECG:   Ventricular Rate 119 BPM    Atrial Rate 122 BPM    QRS Duration 140 ms    Q-T Interval 340 ms    QTC Calculation(Bazett) 478 ms    R Axis 48 degrees    T Axis 239 degrees    Diagnosis Line Atrial fibrillation with rapid ventricular response  Non-specific intra-ventricular conduction delay  Abnormal ECG    Confirmed by Malpeso MD, Yisel ([id number]) on 7/24/2021 7:19:26 PM (07-24-21 @ 15:04)      Imaging Personally Reviewed:  [ ] YES  [x ] NO    Consultant(s) Notes Reviewed:  [x ] YES  [ ] NO  Care Discussed with Consultants/Other Providers [x ] YES  [ ] NO    Medications:	      MEDICATIONS  (STANDING):  apixaban 2.5 milliGRAM(s) Oral every 12 hours  aspirin enteric coated 81 milliGRAM(s) Oral daily  atorvastatin Oral Tab/Cap - Peds 10 milliGRAM(s) Oral daily  chlorhexidine 4% Liquid 1 Application(s) Topical <User Schedule>  hydrochlorothiazide 25 milliGRAM(s) Oral daily  losartan 75 milliGRAM(s) Oral daily  memantine 10 milliGRAM(s) Oral daily  mirtazapine 15 milliGRAM(s) Oral at bedtime  pantoprazole    Tablet 40 milliGRAM(s) Oral before breakfast  prednisoLONE acetate 1% Suspension 1 Drop(s) Both EYES two times a day  senna 8.6 milliGRAM(s) Oral Tablet - Peds 1 Tablet(s) Oral at bedtime    MEDICATIONS  (PRN):  acetaminophen   Tablet .. 650 milliGRAM(s) Oral every 6 hours PRN Moderate Pain (4 - 6)        ADVANCED DIRECTIVES:            FULL CODE              DECISION MAKER: Patient [  ]  Family [  x]  Other [  ] _______  LEGAL SURROGATE:      GOALS OF CARE DISCUSSION           See previous Palliative Medicine Note    PSYCHOSOCIAL-SPIRITUAL ASSESSMENT:         See Palliative Care SW/ documentation      CURRENT DISPO PLAN:         WILL REMAIN IN HOSPITAL - primary team transitioning away from IV to PO; nurse states tolerating med by mouth       Hospice - at [facility]          REFERRALS	        Palliative Med        Unit SW/Case Mgmt              Hospice

## 2021-07-28 NOTE — PROGRESS NOTE ADULT - ATTENDING COMMENTS
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     #Acute on Chronic HFrEF  saturating well on RA   CXR showed b/l interstitial opacities and pleural effusions  BNP 21 K  EF 43 in 4/21  c/w Lasix 40 mg iv daily   Fluid restriction, daily weight, strict I/Os   c/s cardiology ( Dr Harper) - as per family request   d/c tele   Palliative consult  Pending HF consult    #Chronic Afib  rate controlled   c/w Eliquis and metoprolol    #Dementia  AAO*1 at baseline, requires assistance with ADLs  bedbound at baseline  c/w memantine  Palliative consult     #HTN  c/w losartan and metoprolol    #hypomagnesemia  Replete Mg    #DLD  c/w statins    #Hx of DVT  #DVT Ppx  on Eliquis     #PPI ppx: pantoprazole  Funcitonal Quadriplegia   DIET: DASH, fluid restriction  DNR / DNI    Progress Note Handoff:  Pending: Diuresis   Dispo: acute
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 for sob associated with worsening swelling of both ankles for 2 days. The likely diagnosis was a heart failure exacerbation. The patient was diuresed with lasix to treat the fluid overload and kept fluid restricted. Given the patient's poor prognosis palliative was consulted and is discussing hospice care.     #Acute on Chronic HFrEF  saturating well on RA   CXR showed b/l interstitial opacities and pleural effusions  BNP 21 K  EF 43 in 4/21  c/w Lasix 40 mg iv daily   Fluid restriction, daily weight, strict I/Os   c/s cardiology ( Dr Harper) - as per family request   d/c tele   Palliative has seen the patient, discussing hospice care with the family  HF rec's appreciated, will continue to diurese the patient    #Chronic Afib  rate controlled   Metoprolol held due to sinus pauses but asymptomatic  c/w Eliquis     #Dementia  AAO*1 at baseline, requires assistance with ADLs  bedbound at baseline  c/w memantine      #HTN  c/w losartan and metoprolol    #hypomagnesemia  continue to replete magnesium    #DLD  c/w statins    #Hx of DVT  #DVT Ppx  on Eliquis     #Funcitonal Quadriplegia       DNR / DNI    Progress Note Handoff:  Pending: Hospice discussion, diuresis  Dispo: acute   Family: house staff updated
Patient seen and examined independently and care D/W the intern. PGY1 progress note reviewed. Agree with the findings and plan of care,    Lying comfortably in the bed. No sob. Saturating well on RA. Confused. Unable to give history. Family has decided for Hospice care.     . Will D/C IV Lasix and switch her to Torsemide 20 mg po daily  . Cont her all other meds  . Hospice care F/U  . Very poor prognosis  . Can D/C to Hospice when bed available.     Progress Note Handoff    Pending (specify):  Consults_________, Tests________, Test Results_______, Other_Can D/C her to Hospice when accepted. ________  Family discussion:  Disposition: Home___/SNF___/Other________/Unknown at this time________    Davy Feliciano MD  Spectra: [id number]

## 2021-07-28 NOTE — PROGRESS NOTE ADULT - SUBJECTIVE AND OBJECTIVE BOX
BRAYDON RAMIREZ 89y Female  MRN#: [id number]   CODE STATUS:________        SUBJECTIVE    Patient had no acute events overnight. She remains AAOx0-1                                            ----------------------------------------------------------  OBJECTIVE  PAST MEDICAL & SURGICAL HISTORY  HTN (hypertension)    High cholesterol    Dementia    No significant past surgical history                                              -----------------------------------------------------------  ALLERGIES:  No Known Allergies                                            ------------------------------------------------------------    HOME MEDICATIONS  Home Medications:  Aspir 81 oral delayed release tablet: 1 tab(s) orally once a day (24 Jul 2021 16:48)  atorvastatin 10 mg oral tablet: 1 tab(s) orally once a day (24 Jul 2021 16:48)  Colace 100 mg oral capsule: 1 cap(s) orally 2 times a day (24 Jul 2021 16:48)  Eliquis 2.5 mg oral tablet: 1 tab(s) orally 2 times a day (24 Jul 2021 16:48)  hydroCHLOROthiazide 25 mg oral tablet: 1 tab(s) orally once a day (24 Jul 2021 16:48)  losartan 50 mg oral tablet: 1.5 tab(s) orally once a day (24 Jul 2021 16:48)  memantine 14 mg oral capsule, extended release: 1 cap(s) orally once a day (24 Jul 2021 16:48)  Metoprolol Tartrate 50 mg oral tablet: orally every 8 hours (24 Jul 2021 16:48)  mirtazapine 7.5 mg oral tablet: 2 tab(s) orally once a day (at bedtime) (24 Jul 2021 16:48)  pantoprazole 40 mg oral delayed release tablet: 1 tab(s) orally once a day (24 Jul 2021 16:48)  potassium chloride 10 mEq oral capsule, extended release: 1 cap(s) orally once a day (24 Jul 2021 16:48)  prednisoLONE acetate 1% ophthalmic suspension: 1 drop(s) to each affected eye 2 times a day (24 Jul 2021 16:48)  senna 8.6 mg oral tablet: 1 tab(s) orally once a day (at bedtime) (24 Jul 2021 16:48)                           MEDICATIONS:  STANDING MEDICATIONS  apixaban 2.5 milliGRAM(s) Oral every 12 hours  aspirin enteric coated 81 milliGRAM(s) Oral daily  atorvastatin Oral Tab/Cap - Peds 10 milliGRAM(s) Oral daily  chlorhexidine 4% Liquid 1 Application(s) Topical <User Schedule>  furosemide   Injectable 40 milliGRAM(s) IV Push daily  hydrochlorothiazide 25 milliGRAM(s) Oral daily  losartan 75 milliGRAM(s) Oral daily  memantine 10 milliGRAM(s) Oral daily  mirtazapine 15 milliGRAM(s) Oral at bedtime  pantoprazole    Tablet 40 milliGRAM(s) Oral before breakfast  prednisoLONE acetate 1% Suspension 1 Drop(s) Both EYES two times a day  senna 8.6 milliGRAM(s) Oral Tablet - Peds 1 Tablet(s) Oral at bedtime    PRN MEDICATIONS  acetaminophen   Tablet .. 650 milliGRAM(s) Oral every 6 hours PRN                                            ------------------------------------------------------------  VITAL SIGNS: Last 24 Hours  T(C): 35.6 (28 Jul 2021 05:48), Max: 36.8 (27 Jul 2021 20:44)  T(F): 96 (28 Jul 2021 05:48), Max: 98.3 (27 Jul 2021 20:44)  HR: 98 (28 Jul 2021 05:48) (85 - 98)  BP: 116/96 (28 Jul 2021 05:48) (114/60 - 136/76)  BP(mean): --  RR: 18 (28 Jul 2021 05:48) (18 - 20)  SpO2: 95% (28 Jul 2021 05:48) (94% - 95%)      07-27-21 @ 07:01  -  07-28-21 @ 07:00  --------------------------------------------------------  IN: 470 mL / OUT: 1200 mL / NET: -730 mL                                             --------------------------------------------------------------  LABS:                        12.5   9.48  )-----------( 192      ( 28 Jul 2021 07:33 )             40.7     07-28    140  |  95<L>  |  23<H>  ----------------------------<  80  3.8   |  33<H>  |  1.0    Ca    9.5      28 Jul 2021 07:33  Mg     2.1     07-28    TPro  6.0  /  Alb  3.6  /  TBili  1.1  /  DBili  x   /  AST  14  /  ALT  13  /  AlkPhos  194<H>  07-27                  CARDIAC MARKERS ( 27 Jul 2021 06:47 )  x     / <0.01 ng/mL / x     / x     / x      CARDIAC MARKERS ( 26 Jul 2021 17:39 )  x     / <0.01 ng/mL / x     / x     / x          PHYSICAL EXAM:  GEN: No acute distress, moaning   LUNGS: Clear to auscultation bilaterally   HEART: S1/S2 present. RRR.   ABD: Soft, non-tender, non-distended. 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 The likely diagnosis was a heart failure exacerbation. The patient was diuresed with lasix to treat the fluid overload and kept fluid restricted. Given the patient's poor prognosis palliative was consulted and is discussing hospice care.     # Acute on Chronic HFrEF  - saturating well on RA   - CXR showed b/l interstitial opacities and pleural effusions  - BNP 21 K  - EF 43 in 4/21  - c/w Lasix 40 mg iv daily, must switch to PO Lasix  - Fluid restriction, daily weight, strict I/Os   - c/s cardiology ( Dr Harper) - as per family request   - d/c tele   - Palliative has seen the patient, discussing hospice care with the family  - HF rec's appreciated, will continue to diurese the patient    # Chronic Afib  - rate controlled   - Metoprolol held due to sinus pauses but asymptomatic  - c/w Eliquis     # Dementia  - AAO*1 at baseline, requires assistance with ADLs  - bedbound at baseline  - c/w memantine      # HTN  - c/w losartan and metoprolol    #hypomagnesemia  - continue to replete magnesium    # DLD  - c/w statins    # Hx of DVT  # DVT Ppx  - on Eliquis     # PPI ppx: pantoprazole    # Funcitonal Quadriplegia     # DIET: DASH, fluid restriction    # DISPO: From [facility] home    # DNR / DNI                            BRAYDON RAMIREZ 89y Female  MRN#: [id number]   CODE STATUS:________        SUBJECTIVE    Patient had no acute events overnight. She remains AAOx0-1                                            ----------------------------------------------------------  OBJECTIVE  PAST MEDICAL & SURGICAL HISTORY  HTN (hypertension)    High cholesterol    Dementia    No significant past surgical history                                              -----------------------------------------------------------  ALLERGIES:  No Known Allergies                                            ------------------------------------------------------------    HOME MEDICATIONS  Home Medications:  Aspir 81 oral delayed release tablet: 1 tab(s) orally once a day (24 Jul 2021 16:48)  atorvastatin 10 mg oral tablet: 1 tab(s) orally once a day (24 Jul 2021 16:48)  Colace 100 mg oral capsule: 1 cap(s) orally 2 times a day (24 Jul 2021 16:48)  Eliquis 2.5 mg oral tablet: 1 tab(s) orally 2 times a day (24 Jul 2021 16:48)  hydroCHLOROthiazide 25 mg oral tablet: 1 tab(s) orally once a day (24 Jul 2021 16:48)  losartan 50 mg oral tablet: 1.5 tab(s) orally once a day (24 Jul 2021 16:48)  memantine 14 mg oral capsule, extended release: 1 cap(s) orally once a day (24 Jul 2021 16:48)  Metoprolol Tartrate 50 mg oral tablet: orally every 8 hours (24 Jul 2021 16:48)  mirtazapine 7.5 mg oral tablet: 2 tab(s) orally once a day (at bedtime) (24 Jul 2021 16:48)  pantoprazole 40 mg oral delayed release tablet: 1 tab(s) orally once a day (24 Jul 2021 16:48)  potassium chloride 10 mEq oral capsule, extended release: 1 cap(s) orally once a day (24 Jul 2021 16:48)  prednisoLONE acetate 1% ophthalmic suspension: 1 drop(s) to each affected eye 2 times a day (24 Jul 2021 16:48)  senna 8.6 mg oral tablet: 1 tab(s) orally once a day (at bedtime) (24 Jul 2021 16:48)                           MEDICATIONS:  STANDING MEDICATIONS  apixaban 2.5 milliGRAM(s) Oral every 12 hours  aspirin enteric coated 81 milliGRAM(s) Oral daily  atorvastatin Oral Tab/Cap - Peds 10 milliGRAM(s) Oral daily  chlorhexidine 4% Liquid 1 Application(s) Topical <User Schedule>  furosemide   Injectable 40 milliGRAM(s) IV Push daily  hydrochlorothiazide 25 milliGRAM(s) Oral daily  losartan 75 milliGRAM(s) Oral daily  memantine 10 milliGRAM(s) Oral daily  mirtazapine 15 milliGRAM(s) Oral at bedtime  pantoprazole    Tablet 40 milliGRAM(s) Oral before breakfast  prednisoLONE acetate 1% Suspension 1 Drop(s) Both EYES two times a day  senna 8.6 milliGRAM(s) Oral Tablet - Peds 1 Tablet(s) Oral at bedtime    PRN MEDICATIONS  acetaminophen   Tablet .. 650 milliGRAM(s) Oral every 6 hours PRN                                            ------------------------------------------------------------  VITAL SIGNS: Last 24 Hours  T(C): 35.6 (28 Jul 2021 05:48), Max: 36.8 (27 Jul 2021 20:44)  T(F): 96 (28 Jul 2021 05:48), Max: 98.3 (27 Jul 2021 20:44)  HR: 98 (28 Jul 2021 05:48) (85 - 98)  BP: 116/96 (28 Jul 2021 05:48) (114/60 - 136/76)  BP(mean): --  RR: 18 (28 Jul 2021 05:48) (18 - 20)  SpO2: 95% (28 Jul 2021 05:48) (94% - 95%)      07-27-21 @ 07:01  -  07-28-21 @ 07:00  --------------------------------------------------------  IN: 470 mL / OUT: 1200 mL / NET: -730 mL                                             --------------------------------------------------------------  LABS:                        12.5   9.48  )-----------( 192      ( 28 Jul 2021 07:33 )             40.7     07-28    140  |  95<L>  |  23<H>  ----------------------------<  80  3.8   |  33<H>  |  1.0    Ca    9.5      28 Jul 2021 07:33  Mg     2.1     07-28    TPro  6.0  /  Alb  3.6  /  TBili  1.1  /  DBili  x   /  AST  14  /  ALT  13  /  AlkPhos  194<H>  07-27                  CARDIAC MARKERS ( 27 Jul 2021 06:47 )  x     / <0.01 ng/mL / x     / x     / x      CARDIAC MARKERS ( 26 Jul 2021 17:39 )  x     / <0.01 ng/mL / x     / x     / x          PHYSICAL EXAM:  GEN: No acute distress, moaning   LUNGS: Clear to auscultation bilaterally   HEART: S1/S2 present. RRR.   ABD: Soft, non-tender, non-distended. 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 The likely diagnosis was a heart failure exacerbation. The patient was diuresed with lasix to treat the fluid overload and kept fluid restricted. Given the patient's poor prognosis palliative was consulted and is discussing hospice care.     # Acute on Chronic HFrEF  - saturating well on RA   - CXR showed b/l interstitial opacities and pleural effusions  - BNP 21 K  - EF 43 in 4/21  - c/w Lasix 40 mg iv daily, must switch to PO Lasix for d/c  - Fluid restriction, daily weight, strict I/Os   - c/s cardiology ( Dr Harper) - as per family request   - d/c tele   - Palliative has seen the patient, discussing hospice care with the family  - HF rec's appreciated, will continue to diurese the patient   - Patient likely to be discharged to hospice soon    # Chronic Afib  - rate controlled   - Metoprolol held due to sinus pauses but asymptomatic  - c/w Eliquis     # Dementia  - AAO*1 at baseline, requires assistance with ADLs  - bedbound at baseline  - c/w memantine      # HTN  - c/w losartan and metoprolol    #hypomagnesemia  - continue to replete magnesium    # DLD  - c/w statins    # Hx of DVT  # DVT Ppx  - on Eliquis     # PPI ppx: pantoprazole    # Funcitonal Quadriplegia     # DIET: DASH, fluid restriction    # DISPO: From [facility] home    # DNR / DNI                            BRAYDON RAMIREZ 89y Female  MRN#: [id number]   CODE STATUS:________        SUBJECTIVE    Patient had no acute events overnight. She remains AAOx0-1                                            ----------------------------------------------------------  OBJECTIVE  PAST MEDICAL & SURGICAL HISTORY  HTN (hypertension)    High cholesterol    Dementia    No significant past surgical history                                              -----------------------------------------------------------  ALLERGIES:  No Known Allergies                                            ------------------------------------------------------------    HOME MEDICATIONS  Home Medications:  Aspir 81 oral delayed release tablet: 1 tab(s) orally once a day (24 Jul 2021 16:48)  atorvastatin 10 mg oral tablet: 1 tab(s) orally once a day (24 Jul 2021 16:48)  Colace 100 mg oral capsule: 1 cap(s) orally 2 times a day (24 Jul 2021 16:48)  Eliquis 2.5 mg oral tablet: 1 tab(s) orally 2 times a day (24 Jul 2021 16:48)  hydroCHLOROthiazide 25 mg oral tablet: 1 tab(s) orally once a day (24 Jul 2021 16:48)  losartan 50 mg oral tablet: 1.5 tab(s) orally once a day (24 Jul 2021 16:48)  memantine 14 mg oral capsule, extended release: 1 cap(s) orally once a day (24 Jul 2021 16:48)  Metoprolol Tartrate 50 mg oral tablet: orally every 8 hours (24 Jul 2021 16:48)  mirtazapine 7.5 mg oral tablet: 2 tab(s) orally once a day (at bedtime) (24 Jul 2021 16:48)  pantoprazole 40 mg oral delayed release tablet: 1 tab(s) orally once a day (24 Jul 2021 16:48)  potassium chloride 10 mEq oral capsule, extended release: 1 cap(s) orally once a day (24 Jul 2021 16:48)  prednisoLONE acetate 1% ophthalmic suspension: 1 drop(s) to each affected eye 2 times a day (24 Jul 2021 16:48)  senna 8.6 mg oral tablet: 1 tab(s) orally once a day (at bedtime) (24 Jul 2021 16:48)                           MEDICATIONS:  STANDING MEDICATIONS  apixaban 2.5 milliGRAM(s) Oral every 12 hours  aspirin enteric coated 81 milliGRAM(s) Oral daily  atorvastatin Oral Tab/Cap - Peds 10 milliGRAM(s) Oral daily  chlorhexidine 4% Liquid 1 Application(s) Topical <User Schedule>  furosemide   Injectable 40 milliGRAM(s) IV Push daily  hydrochlorothiazide 25 milliGRAM(s) Oral daily  losartan 75 milliGRAM(s) Oral daily  memantine 10 milliGRAM(s) Oral daily  mirtazapine 15 milliGRAM(s) Oral at bedtime  pantoprazole    Tablet 40 milliGRAM(s) Oral before breakfast  prednisoLONE acetate 1% Suspension 1 Drop(s) Both EYES two times a day  senna 8.6 milliGRAM(s) Oral Tablet - Peds 1 Tablet(s) Oral at bedtime    PRN MEDICATIONS  acetaminophen   Tablet .. 650 milliGRAM(s) Oral every 6 hours PRN                                            ------------------------------------------------------------  VITAL SIGNS: Last 24 Hours  T(C): 35.6 (28 Jul 2021 05:48), Max: 36.8 (27 Jul 2021 20:44)  T(F): 96 (28 Jul 2021 05:48), Max: 98.3 (27 Jul 2021 20:44)  HR: 98 (28 Jul 2021 05:48) (85 - 98)  BP: 116/96 (28 Jul 2021 05:48) (114/60 - 136/76)  BP(mean): --  RR: 18 (28 Jul 2021 05:48) (18 - 20)  SpO2: 95% (28 Jul 2021 05:48) (94% - 95%)      07-27-21 @ 07:01  -  07-28-21 @ 07:00  --------------------------------------------------------  IN: 470 mL / OUT: 1200 mL / NET: -730 mL                                             --------------------------------------------------------------  LABS:                        12.5   9.48  )-----------( 192      ( 28 Jul 2021 07:33 )             40.7     07-28    140  |  95<L>  |  23<H>  ----------------------------<  80  3.8   |  33<H>  |  1.0    Ca    9.5      28 Jul 2021 07:33  Mg     2.1     07-28    TPro  6.0  /  Alb  3.6  /  TBili  1.1  /  DBili  x   /  AST  14  /  ALT  13  /  AlkPhos  194<H>  07-27                  CARDIAC MARKERS ( 27 Jul 2021 06:47 )  x     / <0.01 ng/mL / x     / x     / x      CARDIAC MARKERS ( 26 Jul 2021 17:39 )  x     / <0.01 ng/mL / x     / x     / x          PHYSICAL EXAM:  GEN: No acute distress, moaning   LUNGS: Clear to auscultation bilaterally   HEART: S1/S2 present. RRR.   ABD: Soft, non-tender, non-distended. Bowel sounds present  EXT: NC/NC/NE/2+PP/HALLMAN  NEURO: AAOX0-1  SKIN intact         Assessment and Plan:   	  89 yr old female with hx of Alz dementia (AAOx1 at baseline), HFrEF (EF 43%), HTN, HLD, Afib (on Eliquis GERD, liver mass, DVT, COVID-19 infection 4/2020 presents from residence at [facility] for sob associated with worsening swelling of both ankles for 2 days. The likely diagnosis was a heart failure exacerbation. The patient was diuresed with lasix to treat the fluid overload and kept fluid restricted. Given the patient's poor prognosis palliative was consulted and is discussing hospice care.     # Acute on Chronic HFrEF  - saturating well on RA   - CXR showed b/l interstitial opacities and pleural effusions  - BNP 21 K  - EF 43 in 4/21  - c/w Lasix 40 mg iv daily, must switch to PO Lasix for d/c  - Fluid restriction, daily weight, strict I/Os   - d/c tele   - Palliative has seen the patient, discussing hospice care with the family  - HF rec's appreciated, will continue to diurese the patient   - Patient likely to be discharged to hospice soon    # Chronic Afib  - rate controlled   - Metoprolol held due to sinus pauses but asymptomatic  - c/w Eliquis     # Dementia  - AAO*1 at baseline, requires assistance with ADLs  - bedbound at baseline  - c/w memantine      # HTN  - c/w losartan and metoprolol    #hypomagnesemia  - continue to replete magnesium as needed    # DLD  - c/w statins    # Hx of DVT  # DVT Ppx  - on Eliquis     # PPI ppx: pantoprazole    # Funcitonal Quadriplegia     # DIET: DASH, fluid restriction    # DISPO: From [facility] home    # DNR / DNI

## 2021-07-29 LAB
ALBUMIN SERPL ELPH-MCNC: 3.4 G/DL — LOW (ref 3.5–5.2)
ALP SERPL-CCNC: 174 U/L — HIGH (ref 30–115)
ALT FLD-CCNC: 9 U/L — SIGNIFICANT CHANGE UP (ref 0–41)
ANION GAP SERPL CALC-SCNC: 13 MMOL/L — SIGNIFICANT CHANGE UP (ref 7–14)
AST SERPL-CCNC: 12 U/L — SIGNIFICANT CHANGE UP (ref 0–41)
BASOPHILS # BLD AUTO: 0.04 K/UL — SIGNIFICANT CHANGE UP (ref 0–0.2)
BASOPHILS NFR BLD AUTO: 0.4 % — SIGNIFICANT CHANGE UP (ref 0–1)
BILIRUB SERPL-MCNC: 1.3 MG/DL — HIGH (ref 0.2–1.2)
BUN SERPL-MCNC: 23 MG/DL — HIGH (ref 10–20)
CALCIUM SERPL-MCNC: 9.2 MG/DL — SIGNIFICANT CHANGE UP (ref 8.5–10.1)
CHLORIDE SERPL-SCNC: 95 MMOL/L — LOW (ref 98–110)
CO2 SERPL-SCNC: 30 MMOL/L — SIGNIFICANT CHANGE UP (ref 17–32)
CREAT SERPL-MCNC: 0.9 MG/DL — SIGNIFICANT CHANGE UP (ref 0.7–1.5)
EOSINOPHIL # BLD AUTO: 0.13 K/UL — SIGNIFICANT CHANGE UP (ref 0–0.7)
EOSINOPHIL NFR BLD AUTO: 1.2 % — SIGNIFICANT CHANGE UP (ref 0–8)
GLUCOSE SERPL-MCNC: 102 MG/DL — HIGH (ref 70–99)
HCT VFR BLD CALC: 41.5 % — SIGNIFICANT CHANGE UP (ref 37–47)
HGB BLD-MCNC: 12.7 G/DL — SIGNIFICANT CHANGE UP (ref 12–16)
IMM GRANULOCYTES NFR BLD AUTO: 0.5 % — HIGH (ref 0.1–0.3)
LYMPHOCYTES # BLD AUTO: 1.63 K/UL — SIGNIFICANT CHANGE UP (ref 1.2–3.4)
LYMPHOCYTES # BLD AUTO: 15 % — LOW (ref 20.5–51.1)
MAGNESIUM SERPL-MCNC: 2 MG/DL — SIGNIFICANT CHANGE UP (ref 1.8–2.4)
MCHC RBC-ENTMCNC: 25.8 PG — LOW (ref 27–31)
MCHC RBC-ENTMCNC: 30.6 G/DL — LOW (ref 32–37)
MCV RBC AUTO: 84.2 FL — SIGNIFICANT CHANGE UP (ref 81–99)
MONOCYTES # BLD AUTO: 0.94 K/UL — HIGH (ref 0.1–0.6)
MONOCYTES NFR BLD AUTO: 8.7 % — SIGNIFICANT CHANGE UP (ref 1.7–9.3)
NEUTROPHILS # BLD AUTO: 8.05 K/UL — HIGH (ref 1.4–6.5)
NEUTROPHILS NFR BLD AUTO: 74.2 % — SIGNIFICANT CHANGE UP (ref 42.2–75.2)
NRBC # BLD: 0 /100 WBCS — SIGNIFICANT CHANGE UP (ref 0–0)
PLATELET # BLD AUTO: 181 K/UL — SIGNIFICANT CHANGE UP (ref 130–400)
POTASSIUM SERPL-MCNC: 3.6 MMOL/L — SIGNIFICANT CHANGE UP (ref 3.5–5)
POTASSIUM SERPL-SCNC: 3.6 MMOL/L — SIGNIFICANT CHANGE UP (ref 3.5–5)
PROT SERPL-MCNC: 6 G/DL — SIGNIFICANT CHANGE UP (ref 6–8)
RBC # BLD: 4.93 M/UL — SIGNIFICANT CHANGE UP (ref 4.2–5.4)
RBC # FLD: 19.8 % — HIGH (ref 11.5–14.5)
SARS-COV-2 RNA SPEC QL NAA+PROBE: SIGNIFICANT CHANGE UP
SODIUM SERPL-SCNC: 138 MMOL/L — SIGNIFICANT CHANGE UP (ref 135–146)
WBC # BLD: 10.84 K/UL — HIGH (ref 4.8–10.8)
WBC # FLD AUTO: 10.84 K/UL — HIGH (ref 4.8–10.8)

## 2021-07-29 PROCEDURE — 99231 SBSQ HOSP IP/OBS SF/LOW 25: CPT

## 2021-07-29 RX ORDER — METOPROLOL TARTRATE 50 MG
0 TABLET ORAL
Qty: 0 | Refills: 0 | DISCHARGE

## 2021-07-29 RX ADMIN — Medication 20 MILLIGRAM(S): at 05:49

## 2021-07-29 RX ADMIN — ATORVASTATIN CALCIUM 10 MILLIGRAM(S): 80 TABLET, FILM COATED ORAL at 12:26

## 2021-07-29 RX ADMIN — APIXABAN 2.5 MILLIGRAM(S): 2.5 TABLET, FILM COATED ORAL at 05:50

## 2021-07-29 RX ADMIN — Medication 81 MILLIGRAM(S): at 12:26

## 2021-07-29 RX ADMIN — SENNA PLUS 1 TABLET(S): 8.6 TABLET ORAL at 22:01

## 2021-07-29 RX ADMIN — MIRTAZAPINE 15 MILLIGRAM(S): 45 TABLET, ORALLY DISINTEGRATING ORAL at 22:01

## 2021-07-29 RX ADMIN — Medication 25 MILLIGRAM(S): at 05:50

## 2021-07-29 RX ADMIN — APIXABAN 2.5 MILLIGRAM(S): 2.5 TABLET, FILM COATED ORAL at 17:46

## 2021-07-29 RX ADMIN — Medication 1 DROP(S): at 17:45

## 2021-07-29 RX ADMIN — MEMANTINE HYDROCHLORIDE 10 MILLIGRAM(S): 10 TABLET ORAL at 12:26

## 2021-07-29 RX ADMIN — LOSARTAN POTASSIUM 75 MILLIGRAM(S): 100 TABLET, FILM COATED ORAL at 06:02

## 2021-07-29 RX ADMIN — PANTOPRAZOLE SODIUM 40 MILLIGRAM(S): 20 TABLET, DELAYED RELEASE ORAL at 05:50

## 2021-07-29 RX ADMIN — Medication 1 DROP(S): at 06:01

## 2021-07-29 RX ADMIN — CHLORHEXIDINE GLUCONATE 1 APPLICATION(S): 213 SOLUTION TOPICAL at 05:49

## 2021-07-29 NOTE — DISCHARGE NOTE PROVIDER - HOSPITAL COURSE
89F with PMH of dementia (AAOx0), HFrEF (EF 43%), HTN, HLD, Afib (on eliquis), GERD, liver mass, DVT, COVID-19 infection 4/2020 presents from residence at [facility] for shortness of breath. Patient cannot provide any history (AOx0-1), it was obtained from her nephew at bedside. As per him her spo2 was low in the residence home this morning when she was assessed. She had multiple hospitalizations for heart failure exacerbation. She is still able to lie flat and denied any other associated symptoms.    In the ED VS showed tachycardia . CXR showed bilateral opacities and pleural effusions. She received 40 mg of iv lasix. She was diagnosed with acute on chronic HFrEF (43%), was diuresed with lasix since her CXR showed pleural effusions. BNP was 21k. Her daily weights and I&Os were being followed. She was satting well on RA. She has chronic Afib that is rate controlled on metoprolol and her HTN was managed. Patient has a poor prognosis and is going to be sent home with hospice care.

## 2021-07-29 NOTE — DISCHARGE NOTE PROVIDER - CARE PROVIDER_API CALL
Eladio Polk)  Internal Medicine  [address], [address] [place] [address]  Phone: [phone]  Fax: [phone]  Follow Up Time: Routine

## 2021-07-29 NOTE — DISCHARGE NOTE PROVIDER - NSDCCPCAREPLAN_GEN_ALL_CORE_FT
PRINCIPAL DISCHARGE DIAGNOSIS  Diagnosis: Acute on chronic HFrEF (heart failure with reduced ejection fraction)  Assessment and Plan of Treatment: You came to the hospital complaining of shortness of breath and increased leg swelling for the past several days. Upon investigations and taking your history of heart failure (EF 43%) into account, you were found to have acute on chronic heart failure. This caused fluid overload in your body that we managed with diurses using lasix, and you were discharged on torsemide to stay on. Your oxygen saturation was adequate on room air. Please take your medications as prescribed.

## 2021-07-29 NOTE — PROGRESS NOTE ADULT - SUBJECTIVE AND OBJECTIVE BOX
CHIEF COMPLAINT:    Patient is a 89y old  Female who presents with a chief complaint of shortness of breath (2021 11:24)      INTERVAL HPI/OVERNIGHT EVENTS:    Patient seen and examined at bedside. No acute overnight events occurred.    ROS: All other systems are negative.    Vital Signs:    T(F): 96.1 (21 @ 12:32), Max: 96.5 (21 @ 04:55)  HR: 99 (21 @ 12:32) (97 - 119)  BP: 98/54 (21 @ 12:32) (98/54 - 146/55)  RR: 18 (21 @ 12:32) (16 - 18)  SpO2: 93% (21 @ 19:48) (93% - 93%)  I&O's Summary    2021 07:  -  2021 07:00  --------------------------------------------------------  IN: 0 mL / OUT: 620 mL / NET: -620 mL    2021 07:  -  2021 17:56  --------------------------------------------------------  IN: 500 mL / OUT: 750 mL / NET: -250 mL      Daily     Daily Weight in k.9 (2021 04:55)  CAPILLARY BLOOD GLUCOSE          PHYSICAL EXAM:  Deferred    Consultant(s) Notes Reviewed:  [x ] YES  [ ] NO  Care Discussed with Consultants/Other Providers [ x] YES  [ ] NO    LABS:                        12.7   10.84 )-----------( 181      ( 2021 05:34 )             41.5         138  |  95<L>  |  23<H>  ----------------------------<  102<H>  3.6   |  30  |  0.9    Ca    9.2      2021 05:34  Mg     2.0         TPro  6.0  /  Alb  3.4<L>  /  TBili  1.3<H>  /  DBili  x   /  AST  12  /  ALT  9   /  AlkPhos  174<H>        Serum Pro-Brain Natriuretic Peptide: 42467 pg/mL (21 @ 14:06)    Trop <0.01, CKMB --, CK --, 21 @ 06:47        RADIOLOGY & ADDITIONAL TESTS:      Medications:  Standing  apixaban 2.5 milliGRAM(s) Oral every 12 hours  aspirin enteric coated 81 milliGRAM(s) Oral daily  atorvastatin Oral Tab/Cap - Peds 10 milliGRAM(s) Oral daily  chlorhexidine 4% Liquid 1 Application(s) Topical <User Schedule>  hydrochlorothiazide 25 milliGRAM(s) Oral daily  losartan 75 milliGRAM(s) Oral daily  memantine 10 milliGRAM(s) Oral daily  mirtazapine 15 milliGRAM(s) Oral at bedtime  pantoprazole    Tablet 40 milliGRAM(s) Oral before breakfast  prednisoLONE acetate 1% Suspension 1 Drop(s) Both EYES two times a day  senna 8.6 milliGRAM(s) Oral Tablet - Peds 1 Tablet(s) Oral at bedtime  torsemide 20 milliGRAM(s) Oral daily    PRN Meds  acetaminophen   Tablet .. 650 milliGRAM(s) Oral every 6 hours PRN      Case discussed with resident

## 2021-07-29 NOTE — DISCHARGE NOTE PROVIDER - NSDCMRMEDTOKEN_GEN_ALL_CORE_FT
Aspir 81 oral delayed release tablet: 1 tab(s) orally once a day  atorvastatin 10 mg oral tablet: 1 tab(s) orally once a day  Colace 100 mg oral capsule: 1 cap(s) orally 2 times a day  Eliquis 2.5 mg oral tablet: 1 tab(s) orally 2 times a day  hydroCHLOROthiazide 25 mg oral tablet: 1 tab(s) orally once a day  losartan 50 mg oral tablet: 1.5 tab(s) orally once a day  memantine 14 mg oral capsule, extended release: 1 cap(s) orally once a day  Metoprolol Tartrate 50 mg oral tablet: orally every 8 hours  mirtazapine 7.5 mg oral tablet: 2 tab(s) orally once a day (at bedtime)  pantoprazole 40 mg oral delayed release tablet: 1 tab(s) orally once a day  potassium chloride 10 mEq oral capsule, extended release: 1 cap(s) orally once a day  prednisoLONE acetate 1% ophthalmic suspension: 1 drop(s) to each affected eye 2 times a day  senna 8.6 mg oral tablet: 1 tab(s) orally once a day (at bedtime)  torsemide 20 mg oral tablet: 1 tab(s) orally once a day  Tylenol 325 mg oral tablet: 2 tab(s) orally every 6 hours  -for moderate pain

## 2021-07-30 VITALS — SYSTOLIC BLOOD PRESSURE: 115 MMHG | DIASTOLIC BLOOD PRESSURE: 73 MMHG

## 2021-07-30 PROCEDURE — 99238 HOSP IP/OBS DSCHRG MGMT 30/<: CPT

## 2021-07-30 RX ADMIN — PANTOPRAZOLE SODIUM 40 MILLIGRAM(S): 20 TABLET, DELAYED RELEASE ORAL at 06:07

## 2021-07-30 RX ADMIN — Medication 20 MILLIGRAM(S): at 06:08

## 2021-07-30 RX ADMIN — MEMANTINE HYDROCHLORIDE 10 MILLIGRAM(S): 10 TABLET ORAL at 11:18

## 2021-07-30 RX ADMIN — APIXABAN 2.5 MILLIGRAM(S): 2.5 TABLET, FILM COATED ORAL at 06:07

## 2021-07-30 RX ADMIN — LOSARTAN POTASSIUM 75 MILLIGRAM(S): 100 TABLET, FILM COATED ORAL at 06:07

## 2021-07-30 RX ADMIN — Medication 81 MILLIGRAM(S): at 11:19

## 2021-07-30 RX ADMIN — Medication 1 DROP(S): at 06:08

## 2021-07-30 RX ADMIN — Medication 25 MILLIGRAM(S): at 06:07

## 2021-07-30 RX ADMIN — ATORVASTATIN CALCIUM 10 MILLIGRAM(S): 80 TABLET, FILM COATED ORAL at 11:18

## 2021-07-30 RX ADMIN — CHLORHEXIDINE GLUCONATE 1 APPLICATION(S): 213 SOLUTION TOPICAL at 06:09

## 2021-07-30 NOTE — PROGRESS NOTE ADULT - ASSESSMENT
89 yo F PMHx Alzheimer dementia, HFrEF, HTN, DLD, Afib, liver mass, DVT, Covid-19, presente for evaluation of SOB with b/l LE edema. Family opted for hospice care.    Acute on Chronic HFrEF  Chronic afib  HTN  DLD  - metoprolol held for pause  - c/w toresemide  - c/w mirtazapine, namendo, losartan, HCTZ, lipitor  - dc meds under hospice guidance  - morphine for pain/agitation  - further mgmt as per hospice    DNI/DNI    DC pending acceptance to facility  CM discussed care with family
89 yr old female with hx of Alz dementia (AAOx1 at baseline), HFrEF (EF 43%), HTN, HLD, Afib (on Eliquis GERD, liver mass, DVT, COVID-19 infection 4/2020 presents from residence at [facility] for sob associated with worsening swelling of both ankles for 2 days.     # Acute on Chronic HFrEF  - saturating well on RA   - CXR showed b/l interstitial opacities and pleural effusions  - BNP 21 K  - EF 43 in 4/21  - c/w Lasix 40 mg iv daily   - Fluid restriction, daily weight, strict I/Os   - c/s cardiology ( Dr Harper) - as per family request   - d/c tele     # Chronic Afib  - rate controlled   - c/w Eliquis and metoprolol    # Dementia  - AAO*1 at baseline, requies assistance with ADLs  - bedbound at baseline  - c/w memantine  - Palliative consult     # HTN  - c/w losartan and metoprolol    # DLD  - c/w statins    # Hx of DVT  # DVT Ppx  - on Eliquis     # PPI ppx: pantoprazole    # Funcitonal Quadriplegia     # DIET: DASH, fluid restriction    # DISPO: From [facility] residence home    # DNR / DNI
ASSESSMENT & PLAN    89 yr old female with hx of Alz dementia (AAOx1 at baseline), HFrEF (EF 43%), HTN, HLD, Afib (on Eliquis GERD, liver mass, DVT, COVID-19 infection 4/2020 presents from residence at [facility] for sob associated with worsening swelling of both ankles for 2 days. The likely diagnosis was a heart failure exacerbation. The patient was diuresed with lasix to treat the fluid overload and kept fluid restricted. Given the patient's poor prognosis palliative was consulted and is discussing hospice care.     Acute on Chronic HFrEF  Chronic afib  HTN  DLD  - metoprolol held for pause  - c/w toresemide  - c/w mirtazapine, namendo, losartan, HCTZ, lipitor  - dc meds under hospice guidance  - morphine for pain/agitation  - further mgmt as per hospice    -Handoff: Pending khan placement at [facility]. Ready to be discharged on hospice care otherwise.    # PPI ppx: pantoprazole    # Funcitonal Quadriplegia     # DIET: DASH, fluid restriction    # DISPO: From [facility]    # DNR / DNI    
89yFemale with hx of Alz dementia (AAOx1 at baseline), HFrEF (EF 43%), HTN, HLD, Afib (on Eliquis GERD, liver mass, DVT, COVID-19 infection 4/2020 presents from residence at [facility] for sob associated with worsening swelling of both ankles for 2 days. Palliative evaluating for support with GOC in the setting of dementia and multiple hospitalizations.    MEDD (morphine equivalent daily dose):  NA      See Recs below. d/w primary team     Please call [phone] with questions or concerns 24/7.   We will continue to follow.

## 2021-07-30 NOTE — PROGRESS NOTE ADULT - SUBJECTIVE AND OBJECTIVE BOX
BRAYDON RAMIREZ 89y Female  MRN#: [id number]   CODE STATUS: DNR    Hospital Day: 6d    Pt is currently admitted with the primary diagnosis of Shortness of breath    SUBJECTIVE  Hospital Course  Patient seen and examined at bedside. Pt still AOx1. Ready to discharge, pending khan placement and subsequent care at [facility].    Overnight events   None    Subjective complaints   None    Present Today:   - Khan:  No [  ], Yes [X] : Indication: Retention    - Type of IV Access:       .. CVC/Piccline:  No [  ], Yes [   ] : Indication:       .. Midline: No [  ], Yes [   ] : Indication:                                             ----------------------------------------------------------  OBJECTIVE  PAST MEDICAL & SURGICAL HISTORY  HTN (hypertension)    High cholesterol    Dementia    No significant past surgical history                                              -----------------------------------------------------------  ALLERGIES:  No Known Allergies                                            ------------------------------------------------------------    HOME MEDICATIONS  Home Medications:  Aspir 81 oral delayed release tablet: 1 tab(s) orally once a day (24 Jul 2021 16:48)  atorvastatin 10 mg oral tablet: 1 tab(s) orally once a day (24 Jul 2021 16:48)  Colace 100 mg oral capsule: 1 cap(s) orally 2 times a day (24 Jul 2021 16:48)  Eliquis 2.5 mg oral tablet: 1 tab(s) orally 2 times a day (24 Jul 2021 16:48)  hydroCHLOROthiazide 25 mg oral tablet: 1 tab(s) orally once a day (24 Jul 2021 16:48)  losartan 50 mg oral tablet: 1.5 tab(s) orally once a day (24 Jul 2021 16:48)  memantine 14 mg oral capsule, extended release: 1 cap(s) orally once a day (24 Jul 2021 16:48)  mirtazapine 7.5 mg oral tablet: 2 tab(s) orally once a day (at bedtime) (24 Jul 2021 16:48)  pantoprazole 40 mg oral delayed release tablet: 1 tab(s) orally once a day (24 Jul 2021 16:48)  potassium chloride 10 mEq oral capsule, extended release: 1 cap(s) orally once a day (24 Jul 2021 16:48)  prednisoLONE acetate 1% ophthalmic suspension: 1 drop(s) to each affected eye 2 times a day (24 Jul 2021 16:48)  senna 8.6 mg oral tablet: 1 tab(s) orally once a day (at bedtime) (24 Jul 2021 16:48)                           MEDICATIONS:  STANDING MEDICATIONS  apixaban 2.5 milliGRAM(s) Oral every 12 hours  aspirin enteric coated 81 milliGRAM(s) Oral daily  atorvastatin Oral Tab/Cap - Peds 10 milliGRAM(s) Oral daily  chlorhexidine 4% Liquid 1 Application(s) Topical <User Schedule>  hydrochlorothiazide 25 milliGRAM(s) Oral daily  losartan 75 milliGRAM(s) Oral daily  memantine 10 milliGRAM(s) Oral daily  mirtazapine 15 milliGRAM(s) Oral at bedtime  pantoprazole    Tablet 40 milliGRAM(s) Oral before breakfast  prednisoLONE acetate 1% Suspension 1 Drop(s) Both EYES two times a day  senna 8.6 milliGRAM(s) Oral Tablet - Peds 1 Tablet(s) Oral at bedtime  torsemide 20 milliGRAM(s) Oral daily    PRN MEDICATIONS  acetaminophen   Tablet .. 650 milliGRAM(s) Oral every 6 hours PRN                                            ------------------------------------------------------------  VITAL SIGNS: Last 24 Hours  T(C): 36.2 (30 Jul 2021 05:01), Max: 36.2 (29 Jul 2021 20:21)  T(F): 97.1 (30 Jul 2021 05:01), Max: 97.1 (29 Jul 2021 20:21)  HR: 98 (30 Jul 2021 08:00) (93 - 103)  BP: 123/69 (30 Jul 2021 05:01) (98/54 - 123/69)  BP(mean): --  RR: 16 (30 Jul 2021 05:01) (16 - 18)  SpO2: 92% (30 Jul 2021 08:00) (92% - 92%)      07-29-21 @ 07:01  -  07-30-21 @ 07:00  --------------------------------------------------------  IN: 960 mL / OUT: 1800 mL / NET: -840 mL                                             --------------------------------------------------------------  LABS:                        12.7   10.84 )-----------( 181      ( 29 Jul 2021 05:34 )             41.5     07-29    138  |  95<L>  |  23<H>  ----------------------------<  102<H>  3.6   |  30  |  0.9    Ca    9.2      29 Jul 2021 05:34  Mg     2.0     07-29    TPro  6.0  /  Alb  3.4<L>  /  TBili  1.3<H>  /  DBili  x   /  AST  12  /  ALT  9   /  AlkPhos  174<H>  07-29                                                -------------------------------------------------------------  RADIOLOGY:                                            --------------------------------------------------------------    PHYSICAL EXAM:    Patient was sitting in bed with no complaints but did not want me to examine her. She didn't look in acute distress and went back to sleep.                                           --------------------------------------------------------------

## 2021-07-30 NOTE — ADVANCED PRACTICE NURSE CONSULT - ASSESSMENT
Patient is a 89F with PMH of dementia (AAOx0), HFrEF (EF 43%), HTN, HLD, Afib (on eliquis), GERD, liver mass, DVT, COVID-19 infection 4/2020 presents from residence at [facility] for shortness of breath. Patient cannot provide any history, it was obtained from her nephew at bedside. As per him her spo2 was low in the residence home this morning when she was assessed. She had multiple hospitalizations for heart failure exacerbation. She is still able to lie flat and denied any other associated symptoms.  In the ED VS showed tachycardia .  CXR showed bilateral opacities and pleural effusions.  she received 40 mg of iv lasix.     PAST MEDICAL & SURGICAL HISTORY:  HTN (hypertension)  High cholesterol  Dementia  No significant past surgical history    Assessment:  Patient received in bed , Awake , but confused . Primary Rn present at bedside at time of assessment.                      Skin assessed-     Pressure Injury  #1- Hospital acquired unit 3C  Location:  L buttock extending to sacrum   Size: Length:  4x4  Tissue Description :   [ ] Necrotic   [ ] Slough   [ ] Infected   [ ] Granulation (firm, beefy red tissue)   [ ] Hypergranulation (soft, gelatinous)  [ ] Poor-Quality Granulation (pale, grey/brown/red granulation tissue)   [ ] Epithelium (pink/mauve at wound edges)  [ ] Macerated  [x ] Other: Intact dark red to purple skin discolouration   Wound Exudate : None    Wound Edge): Intact  Periwound Condition (area that extends 4cm from the edge of the wound):   [ ] Maceration [ ] Excoriation [x ] Dry skin [ ] Hyperkeratosis [ ] Callus [ ] Eczema [ ] Other: _______    Pressure Injury Stage   [ ] Stage I  [ ]  Stage II  [ ]  Stage III  [ ]  Stage VI  [x]  Suspected Deep Tissue Injury (DTI)  [ ]  Unstageable    Other Etiology:  [ ] Aterial  [ ] Venous   [ ] Surgical Incision  [ ] Other: ________

## 2021-07-30 NOTE — PROGRESS NOTE ADULT - PROVIDER SPECIALTY LIST ADULT
Internal Medicine
Palliative Care

## 2021-07-30 NOTE — ADVANCED PRACTICE NURSE CONSULT - RECOMMEDATIONS
Plan: Continue skin barrier        Continue Pressure ulcer preventive  measures    Continue skin care   Asses DTI and inform primary provider of any changes   Case discussed with primary Rn  Wound/ ostomy specialist  to f/u  progression of DTI     Offloading: [ ] Frequent position changes [ ] Devices/Equipment  Cleansing: [ ] Saline [ ] Soap/Water [ ] Other: ______  Topicals: [ ] Barrier Cream [ ] Antimicrobial [ ] Enzymatic Wound Debridement  Dressings: [ ] Dry, sterile [ ] Foam [ ] Absorbant Pads [ ] Collagenase

## 2021-07-30 NOTE — CHART NOTE - NSCHARTNOTEFT_GEN_A_CORE
Chart rev'd  d/w primary team  goals clear  no symptoms to manage  signing off  reconsult prn  x [phone]

## 2021-07-30 NOTE — DISCHARGE NOTE NURSING/CASE MANAGEMENT/SOCIAL WORK - PATIENT PORTAL LINK FT
You can access the FollowMyHealth Patient Portal offered by [facility] by registering at the following website: [email or web address]. By joining [employer]’s FollowMyHealth portal, you will also be able to view your health information using other applications (apps) compatible with our system.

## 2021-08-06 DIAGNOSIS — Z79.01 LONG TERM (CURRENT) USE OF ANTICOAGULANTS: ICD-10-CM

## 2021-08-06 DIAGNOSIS — J96.01 ACUTE RESPIRATORY FAILURE WITH HYPOXIA: ICD-10-CM

## 2021-08-06 DIAGNOSIS — F02.80 DEMENTIA IN OTHER DISEASES CLASSIFIED ELSEWHERE, UNSPECIFIED SEVERITY, WITHOUT BEHAVIORAL DISTURBANCE, PSYCHOTIC DISTURBANCE, MOOD DISTURBANCE, AND ANXIETY: ICD-10-CM

## 2021-08-06 DIAGNOSIS — Z87.891 PERSONAL HISTORY OF NICOTINE DEPENDENCE: ICD-10-CM

## 2021-08-06 DIAGNOSIS — Z86.718 PERSONAL HISTORY OF OTHER VENOUS THROMBOSIS AND EMBOLISM: ICD-10-CM

## 2021-08-06 DIAGNOSIS — I48.20 CHRONIC ATRIAL FIBRILLATION, UNSPECIFIED: ICD-10-CM

## 2021-08-06 DIAGNOSIS — Z86.16 PERSONAL HISTORY OF COVID-19: ICD-10-CM

## 2021-08-06 DIAGNOSIS — K21.9 GASTRO-ESOPHAGEAL REFLUX DISEASE WITHOUT ESOPHAGITIS: ICD-10-CM

## 2021-08-06 DIAGNOSIS — I11.0 HYPERTENSIVE HEART DISEASE WITH HEART FAILURE: ICD-10-CM

## 2021-08-06 DIAGNOSIS — E78.00 PURE HYPERCHOLESTEROLEMIA, UNSPECIFIED: ICD-10-CM

## 2021-08-06 DIAGNOSIS — R16.0 HEPATOMEGALY, NOT ELSEWHERE CLASSIFIED: ICD-10-CM

## 2021-08-06 DIAGNOSIS — G30.9 ALZHEIMER'S DISEASE, UNSPECIFIED: ICD-10-CM

## 2021-08-06 DIAGNOSIS — E83.42 HYPOMAGNESEMIA: ICD-10-CM

## 2021-08-06 DIAGNOSIS — Z66 DO NOT RESUSCITATE: ICD-10-CM

## 2021-08-06 DIAGNOSIS — I50.23 ACUTE ON CHRONIC SYSTOLIC (CONGESTIVE) HEART FAILURE: ICD-10-CM

## 2021-08-06 DIAGNOSIS — E87.70 FLUID OVERLOAD, UNSPECIFIED: ICD-10-CM

## 2021-08-06 DIAGNOSIS — R53.2 FUNCTIONAL QUADRIPLEGIA: ICD-10-CM

## 2022-04-13 NOTE — ED PROVIDER NOTE - PHYSICAL EXAMINATION
I have called Dr. Gael London for assistance on this as I am not able to be in the clinic during open hours due to being on wards. Dr. London stated he would help me take care of it, thank you    Sri Escudero MD, MPH  [employer] [facility], PGY-2     CONSTITUTIONAL: PT lying comfortably in bed, saying a prayer out loud. PT does not respond to questions, in no acute distress.   SKIN: warm, dry  HEAD: Normocephalic; atraumatic.  EYES: PERRLA, EOMI, normal sclera and conjunctiva   ENT: No nasal discharge; airway clear.  NECK: Supple; non tender.  CARD:  Regular rate and rhythm.   RESP: NO inc WOB   ABD: soft ntnd  EXT: Normal ROM.    LYMPH: No acute cervical adenopathy.  NEURO: Alert, oriented, grossly unremarkable  PSYCH: Cooperative, appropriate.

## 2022-06-22 NOTE — DISCHARGE NOTE PROVIDER - HOSPITAL COURSE
Patient made aware of 24/7 emergency services. Ms. Smith is a 89 year old female patient with Dementia from [facility] with HTN, DL, history of DVT who was brought on 04/16 for pain from unknown source (possibly abdomen versus leg per history), status post imaging of abdomen and legs, found to have a drop in Hb, elevated proBNP, and effusions with interstitial edema on imaging, admitted for pain control and possible undiagnosed acute on chronic heart failure exacerbation. Please refer to below for hospital course:      Pain of Unclear Source   - We think it Could be related to History of Multi-loculated cystic L Hepatic Mass on CT 10/29/20 Versus History of Leg DVT in June 2020 Versus new UTI Versus Constipation in Elderly Versus Hematoma in setting of AC and drop in Hb  - CT AP IC (04/16): unchanged multiloculated cystic left hepatic lobe mass compared to CT 10/29/20 -> Recommend MR with contrast  - Family refusing further investigations so will not proceed with MRI imaging as recommended  - On discharge  --> Monitor for nausea and vomiting and give meds as prescribed      For history of DVT in June 2020  - History of leg DVT in June 2020 (unsure which side or vein as there is no report on [place] or HIE). Home med Eliquis 2.5mg BID  - XR of bilateral femurs/tibia/fibula (04/16) no fractures  - On discharge  --> Continue AC with Eliquis 5mg BID      New onset Atrial Fibrillation without RVR  - On discharge  --> For AC: continue eliquis 5mg BID  --> For rate control: continue beta blocker as prescribed        GERD  - On discharge  --> Continue Protonix 40mg QD      HTN  - On discharge  --> Monitor BP closely  --> Continue BP meds as prescribed

## 2022-08-13 NOTE — DISCHARGE NOTE PROVIDER - NSDCCAREPROVSEEN_GEN_ALL_CORE_FT
89F w/ HTN, DM, SSS w/ 6-7 sec pauses and Mobitz 1 s/p PPM (Abbott, Nov 2019), who presented to the hospital with twinging sensation at the pacemaker site that lasts less than 1 second.     # Pacemaker pocket examination.  - Pacemaker site looks c/d/i with no signs of infection.  - EKG shows ApVp rhythm.  - Tele since ED visit shows ApVp rhythm mostly with no skipped beats or loss of capture.  - Patient remains symptom free.  - CXR 2 view acquired: compared to prior CXR, not much change. leads in similar place.  - Discussed with Pt and Son at bedside, who wanted to leave hospital and go back home ASAP. Discussed current findings, and benefit of seeing EP team in the AM with formal interrogation and attending service. They prefer to go and follow up as outpatient. Discussed with Dr. Carlin (on call EP attending), who is agreeable for discharge with instruction for outpatient follow up and symptoms to watch out for.    Jaleel Schwartz MD  Cardiology Fellow - PGY 5  Text or Call: [phone]  For all New Consults and Questions:  [email or web address]   Login: [email or web address]   Internal medicine team

## 2023-02-14 NOTE — DISCHARGE NOTE PROVIDER - NSDCACTIVITY_GEN_ALL_CORE
Colonoscopy   WHAT YOU NEED TO KNOW:   A colonoscopy is a procedure to examine the inside of your colon (intestine) with a scope  Polyps or tissue growths may have been removed during your colonoscopy  It is normal to feel bloated and to have some abdominal discomfort  You should be passing gas  If you have hemorrhoids or you had polyps removed, you may have a small amount of bleeding  DISCHARGE INSTRUCTIONS:   Seek care immediately if:   You have sudden, severe abdominal pain  You have problems swallowing  You have a large amount of black, sticky bowel movements or blood in your bowel movements  You have sudden trouble breathing  You feel weak, lightheaded, or faint or your heart beats faster than normal for you  Contact your healthcare provider if:   You have a fever and chills  You have nausea or are vomiting  Your abdomen is bloated or feels full and hard  You have abdominal pain  You have black, sticky bowel movements or blood in your bowel movements  You have not had a bowel movement for 3 days after your procedure  You have rash or hives  You have questions or concerns about your procedure  Activity:   Do not lift, strain, or run for 24 hours after your procedure  Rest after your procedure  You have been given medicine to relax you  Do not drive or make important decisions until the day after your procedure  Return to your normal activity as directed  Relieve gas and discomfort from bloating by lying on your right side with a heating pad on your abdomen  You may need to take short walks to help the gas move out  Eat small meals until bloating is relieved  Follow up with your healthcare provider as directed: Write down your questions so you remember to ask them during your visits  If you take a “blood thinner”, please review the specific instructions from your endoscopist about when you should resume it   These can be found in the “Recommendation” and “Your Medication list” sections of this After Visit Summary  No restrictions

## 2023-12-29 NOTE — ED ADULT NURSE NOTE - NSFALLRSKUNASSIST_ED_ALL_ED
no Bed in lowest position, wheels locked, appropriate side rails in place/Call bell, personal items and telephone in reach/Instruct patient to call for assistance before getting out of bed or chair/Non-slip footwear when patient is out of bed/[place] to call system/Physically safe environment - no spills, clutter or unnecessary equipment/Purposeful Proactive Rounding/Room/bathroom lighting operational, light cord in reach Bed in lowest position, wheels locked, appropriate side rails in place/Call bell, personal items and telephone in reach/Instruct patient to call for assistance before getting out of bed or chair/Non-slip footwear when patient is out of bed/[place] to call system/Physically safe environment - no spills, clutter or unnecessary equipment/Purposeful Proactive Rounding/Room/bathroom lighting operational, light cord in reach

## 2025-07-01 NOTE — H&P ADULT - EJECTION FRACTION >40 %
Return Yen call and didn't get an answer. LVM for caller to return my call       Copied from CRM #[id number]. Topic: General Inquiry - Patient Advice  >> Jul 1, 2025  1:14 PM Sharla wrote:  Name of Who is Calling:WILLIAM CISSE [[id number]](Yen from [facility])        What is the request in detail:Yen requesting a call back to get info on the Pt.        Can the clinic reply by [facility]:Call        What Number to Call Back [facility] not [facility]:Telephone Information:  Mobile          [phone]  Mobile          [phone]    [facility] [phone]  Fax [phone]  
yes